# Patient Record
Sex: MALE | Race: WHITE | NOT HISPANIC OR LATINO | Employment: FULL TIME | URBAN - METROPOLITAN AREA
[De-identification: names, ages, dates, MRNs, and addresses within clinical notes are randomized per-mention and may not be internally consistent; named-entity substitution may affect disease eponyms.]

---

## 2017-08-24 DIAGNOSIS — Z00.00 ENCOUNTER FOR GENERAL ADULT MEDICAL EXAMINATION WITHOUT ABNORMAL FINDINGS: ICD-10-CM

## 2017-12-07 ENCOUNTER — ALLSCRIPTS OFFICE VISIT (OUTPATIENT)
Dept: OTHER | Facility: OTHER | Age: 47
End: 2017-12-07

## 2017-12-07 ENCOUNTER — LAB REQUISITION (OUTPATIENT)
Dept: LAB | Facility: HOSPITAL | Age: 47
End: 2017-12-07
Payer: COMMERCIAL

## 2017-12-07 DIAGNOSIS — D17.30 BENIGN LIPOMATOUS NEOPLASM OF SKIN AND SUBCUTANEOUS TISSUE: ICD-10-CM

## 2017-12-07 PROCEDURE — 88304 TISSUE EXAM BY PATHOLOGIST: CPT | Performed by: DERMATOLOGY

## 2017-12-08 NOTE — PROGRESS NOTES
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 9)   4  Nephrolithiasis (592 0) (N20 0)   5  Urinary frequency (788 41) (R35 0)   6  Urinary tract infection (599 0) (N39 0)    Past Medical History  Problems    1  History of Hodgkin's lymphoma (V10 72) (Z85 71)   2  History of Nephrolithiasis (V13 01)    The past medical history was reviewed  Surgical History  Problems    1  History of Bone Marrow Transplant    Surgical History reviewed      Family History  Mother    1  Family history of Hypertension (V17 49)  Father    2  Family history of Stroke Syndrome (V17 1)  Family History Reviewed- Derm:   Family History was reviewed      Social History  Problems    · Being A Social Drinker   · Current Smoker (305 1)   · Marital History - Currently    · Never A Smoker  The social history was reviewed      Current Meds   1  Crestor 20 MG Oral Tablet; TAKE 1 TABLET DAILY  Requested for: 12Jun2017; Last Rx:12Jun2017 Ordered   2  Sulfamethoxazole-TMP -160 MG TABS; TAKE 1 TABLET TWICE DAILY; Therapy: [id number] to (Evaluate:23Apr2014)  Requested for: 16Apr2014; Last Rx:16Apr2014 Ordered    Review of Systems   Constitutional: Denies constitutional symptoms  Eyes: Denies eye symptoms  ENT:  denies ear symptoms, nasal symptoms, mouth or throat symptoms  Cardiovascular: Denies cardiovascular symptoms  Respiratory: Denies respiratory symptoms    Gastrointestinal: Denies gastrointestinal symptoms  Musculoskeletal: Denies musculoskeletal symptoms  Integumentary: Denies symptoms other than stated above  Neurological: Denies neurologic symptoms  Psychiatric: Denies psychiatric symptoms  Endocrine: Denies endocrine symptoms  Hematologic/Lymphatic: Denies hematologic symptoms  Allergies  Medication    1  No Known Drug Allergies  Non-Medication    2  Pollen    Physical Exam   Constitutional  General appearance: Appears healthy and well developed  Lymphatic  No visible disturbance  Musculoskeletal  Digits and nails: No clubbing, cyanosis or edema  Cutaneous and nail exam normal    Skin  Scalp skin texture and hair distribution: Normal skin texture on scalp, normal hair distribution  Head: Normal turgor, no rashes, no lesions  Neck: Normal turgor, no rashes, no lesions  Chest: Normal turgor, no rashes, no lesions  Back: Normal turgor, no rashes, no lesions  Right upper extremity: Abnormal    Left upper extremity: Abnormal    Right lower extremity: Normal turgor, no rashes, no lesions  Left lower extremity: Normal turgor, no rashes, no lesions  Neuro/Psych  Alert and oriented x 3  Displays comfort and cooperation during encounterl  Affect is normal    Finding Large pedunculated 6 millimeter nodule noted on the right axilla fleshy in nature several other small skin fleshy pedunculated papules noted on both axilla x3 nothing else remarkable noted on complete exam       Procedure   Procedure: excision of lesion  Indications for the procedure include Probable fibro lipoma  Risks, benefits, alternatives, infection risk, bleeding risk, risk of allergic reaction and risk of scarring were discussed with the patient--   verbal consent was obtained prior to the procedure  Procedure Note: The lesion was located on the Right axilla  The patient was prepped and draped in sterile fashion using Betadine  Anesthesia: 1 ml of lidocaine 1% without epinephrine   shave excision  The hemostasis of the wound was achieved with Aluminum chloride  Dressing: The wound was cleaned and petroleum jelly was applied and a sterile compression dressing was placed  Specimen: the excised lesion was place in buffered formalin and sent for pathology  Post-Procedure:  Patient Status: the patient tolerated the procedure well  Complications: there were no complications  Procedure: skin tag removal   Risks, benefits, alternatives, infection risk, bleeding risk, risk of allergic reaction and the risk of scarring were discussed with the patient--   verbal consent was obtained prior to the procedure  Procedure Note:   Anesthesia: 1 ml of lidocaine 1% without epinephrine  The lesion was located on the Axilla bilaterally  The patient was prepped using alcohol  Removal Technique: removal with Iris scissors--   3 skin tags removed  The hemostasis of the wound was achieved with aluminum chloride  Post-Procedure:  Patient Status: the patient tolerated the procedure well  Complications: there were no complications  Plan  Fibrolipoma of skin    · Wound care as instructed ; Status:Complete;   Done: 73PCD2105    Discussion/Summary   Assessment #1: fibrolipoma  Care Plan:  Presumed diagnosis removed because of the patient concern and growth  Assessment #2: Skin tags  Care Plan:  No further treatment needed  Assessment #3: Screening for dermatologic disorders  Care Plan:  Nothing else of concern noted on complete exam follow-up as needed        Signatures   Electronically signed by : KIKE Snyder ; Dec  7 2017  9:18AM EST                       (Author)

## 2017-12-14 ENCOUNTER — GENERIC CONVERSION - ENCOUNTER (OUTPATIENT)
Dept: OTHER | Facility: OTHER | Age: 47
End: 2017-12-14

## 2018-01-23 NOTE — RESULT NOTES
Verified Results  (1) TISSUE EXAM [id number] 11:55AM Marilin Wei Order Number: [id number]     Test Name Result Flag Reference   LAB AP CASE REPORT (Report)     Surgical Pathology Report             Case: [id number]                   Authorizing Provider: Jorge Plummer MD     Collected:      12/07/2017 1155        Pathologist:      Cathi Hunter MD      Received:      12/11/2017 1435        Specimen:  Skin, Cyst/Tag/Debridement, Right axilla   LAB AP FINAL DIAGNOSIS      A  Skin, Cyst/Tag/Debridement, Right axilla, shave excision:  - Lipofibroma  Interpretation performed at [place] [address]  Electronically signed by Cathi Hunter MD on 12/13/2017 at 7:07 PM   LAB AP SURGICAL ADDITIONAL INFORMATION (Report)     All controls performed with the immunohistochemical stains reported above   reacted appropriately  These tests were developed and their performance   characteristics determined by [facility] or   [facility]  They may not be cleared or approved by the [place]  Food and Drug Administration  The FDA has determined that such clearance   or approval is not necessary  These tests are used for clinical purposes  They should not be regarded as investigational or for research  This   laboratory has been approved by [place] [address], designated as a high-complexity   laboratory and is qualified to perform these tests  LAB AP GROSS DESCRIPTION (Report)     A  The specimen is received in formalin, labeled with the patient's name   and hospital number, and is designated right axilla, is a portion of tan   and wrinkled skin measuring 1 3 x 1 0 x 0 9 cm  The resection margin is   inked green  The specimen is serially sectioned to reveal tan-white   fibrous cut surfaces  Entirely submitted  Two cassettes      Note: The estimated total formalin fixation time based upon information   provided by the submitting clinician and the standard processing schedule   is over 72 hours   Nasrin LOWE CLINICAL INFORMATION      TW Order Number: [id number]  Probable fibrolipoma

## 2018-03-05 ENCOUNTER — OFFICE VISIT (OUTPATIENT)
Dept: INTERNAL MEDICINE CLINIC | Facility: CLINIC | Age: 48
End: 2018-03-05
Payer: COMMERCIAL

## 2018-03-05 ENCOUNTER — TELEPHONE (OUTPATIENT)
Dept: INTERNAL MEDICINE CLINIC | Facility: CLINIC | Age: 48
End: 2018-03-05

## 2018-03-05 VITALS
OXYGEN SATURATION: 96 % | DIASTOLIC BLOOD PRESSURE: 72 MMHG | SYSTOLIC BLOOD PRESSURE: 110 MMHG | TEMPERATURE: 97.5 F | WEIGHT: 268 LBS | BODY MASS INDEX: 35.52 KG/M2 | RESPIRATION RATE: 16 BRPM | HEART RATE: 68 BPM | HEIGHT: 73 IN

## 2018-03-05 DIAGNOSIS — E78.5 HYPERLIPIDEMIA, UNSPECIFIED HYPERLIPIDEMIA TYPE: Primary | ICD-10-CM

## 2018-03-05 DIAGNOSIS — Z13.228 SCREENING FOR METABOLIC DISORDER: ICD-10-CM

## 2018-03-05 DIAGNOSIS — Z13.0 SCREENING FOR DEFICIENCY ANEMIA: ICD-10-CM

## 2018-03-05 DIAGNOSIS — Z13.29 SCREENING FOR THYROID DISORDER: ICD-10-CM

## 2018-03-05 PROCEDURE — 99214 OFFICE O/P EST MOD 30 MIN: CPT | Performed by: PHYSICIAN ASSISTANT

## 2018-03-05 RX ORDER — ROSUVASTATIN CALCIUM 20 MG/1
20 TABLET, FILM COATED ORAL DAILY
Qty: 30 TABLET | Refills: 0 | Status: SHIPPED | OUTPATIENT
Start: 2018-03-05 | End: 2018-04-06 | Stop reason: SDUPTHER

## 2018-03-05 RX ORDER — ROSUVASTATIN CALCIUM 20 MG/1
20 TABLET, COATED ORAL DAILY
Qty: 30 TABLET | Refills: 5 | Status: SHIPPED | OUTPATIENT
Start: 2018-03-05 | End: 2018-03-05 | Stop reason: SDUPTHER

## 2018-03-05 NOTE — PROGRESS NOTES
Assessment/Plan:      Hyperlipidemia- Crestor is renewed, patient has not been on it for about 6 weeks, he should get back on it, stay on it for 6-8 weeks and then recheck his lipid   Panel  He is given blood work orders for other screening tests as well  He refuses   A flu shot    No problem-specific Assessment & Plan notes found for this encounter  Diagnoses and all orders for this visit:    Hyperlipidemia, unspecified hyperlipidemia type  -     CK; Future  -     Lipid panel; Future  -     Discontinue: rosuvastatin (CRESTOR) 20 MG tablet; Take 1 tablet (20 mg total) by mouth daily  -     CRESTOR 20 MG tablet; Take 1 tablet (20 mg total) by mouth daily Brand necessary    Screening for deficiency anemia  -     CBC and differential; Future    Screening for metabolic disorder  -     Comprehensive metabolic panel; Future    Screening for thyroid disorder  -     TSH, 3rd generation with T4 reflex; Future          Subjective:      Patient ID: Belkys Chapin is a 56 y o  male  Patient comes in for medication refill  He has not been here in a little more than a year  He ran out of his Crestor about a month and a half ago  He is feeling well otherwise and has no complaints  Medication Refill   Pertinent negatives include no abdominal pain, arthralgias, chest pain, chills, coughing, fatigue, fever, myalgias, nausea or sore throat  The following portions of the patient's history were reviewed and updated as appropriate: allergies, current medications, past family history, past medical history, past social history, past surgical history and problem list     Review of Systems   Constitutional: Negative for chills, fatigue and fever  HENT: Negative for ear pain, postnasal drip, rhinorrhea and sore throat  Respiratory: Negative for cough, chest tightness and shortness of breath  Cardiovascular: Negative for chest pain and palpitations     Gastrointestinal: Negative for abdominal pain, diarrhea and nausea  Musculoskeletal: Negative for arthralgias and myalgias  Objective:      /72   Pulse 68   Temp 97 5 °F (36 4 °C)   Resp 16   Ht 6' 1" (1 854 m)   Wt 122 kg (268 lb)   SpO2 96%   BMI 35 36 kg/m²          Physical Exam   Constitutional: He is oriented to person, place, and time  He appears well-developed and well-nourished  HENT:   Head: Normocephalic and atraumatic  Right Ear: External ear normal    Left Ear: External ear normal    Mouth/Throat: Oropharynx is clear and moist  No oropharyngeal exudate  Eyes: Conjunctivae are normal  Pupils are equal, round, and reactive to light  Neck: Normal range of motion  Neck supple  Cardiovascular: Normal rate, regular rhythm, normal heart sounds and intact distal pulses  Pulmonary/Chest: Effort normal and breath sounds normal  No respiratory distress  Abdominal: Soft  Bowel sounds are normal  There is no tenderness  Lymphadenopathy:     He has no cervical adenopathy  Neurological: He is alert and oriented to person, place, and time  Skin: Skin is warm and dry  Psychiatric: He has a normal mood and affect   His behavior is normal  Judgment normal

## 2018-04-06 ENCOUNTER — TELEPHONE (OUTPATIENT)
Dept: INTERNAL MEDICINE CLINIC | Facility: CLINIC | Age: 48
End: 2018-04-06

## 2018-04-06 DIAGNOSIS — E78.5 HYPERLIPIDEMIA, UNSPECIFIED HYPERLIPIDEMIA TYPE: ICD-10-CM

## 2018-04-06 RX ORDER — ROSUVASTATIN CALCIUM 20 MG/1
20 TABLET, FILM COATED ORAL DAILY
Qty: 90 TABLET | Refills: 3 | Status: SHIPPED | OUTPATIENT
Start: 2018-04-06 | End: 2018-08-06 | Stop reason: SDUPTHER

## 2018-04-06 NOTE — TELEPHONE ENCOUNTER
PATIENT NEEDS A 3 MONTHS SUPPLY OF THE ROSUVASTATIN MEDICATION    ONLY ONE MONTH WAS CALLED IN  [address] [place] [address]

## 2018-07-26 ENCOUNTER — TELEPHONE (OUTPATIENT)
Dept: INTERNAL MEDICINE CLINIC | Facility: CLINIC | Age: 48
End: 2018-07-26

## 2018-07-26 NOTE — TELEPHONE ENCOUNTER
Pt is sched for physical appt on 08/10/18, would like labs ordered for his appt    Please advise, call back upon completion

## 2018-07-30 DIAGNOSIS — E78.00 HYPERCHOLESTEROLEMIA: ICD-10-CM

## 2018-07-30 DIAGNOSIS — R35.0 INCREASED FREQUENCY OF URINATION: ICD-10-CM

## 2018-07-30 DIAGNOSIS — G58.9 MONONEUROPATHY: Primary | ICD-10-CM

## 2018-07-31 ENCOUNTER — APPOINTMENT (OUTPATIENT)
Dept: LAB | Facility: CLINIC | Age: 48
End: 2018-07-31
Payer: COMMERCIAL

## 2018-07-31 DIAGNOSIS — Z13.29 SCREENING FOR THYROID DISORDER: ICD-10-CM

## 2018-07-31 DIAGNOSIS — Z00.00 ENCOUNTER FOR GENERAL ADULT MEDICAL EXAMINATION WITHOUT ABNORMAL FINDINGS: ICD-10-CM

## 2018-07-31 DIAGNOSIS — G58.9 MONONEUROPATHY: ICD-10-CM

## 2018-07-31 DIAGNOSIS — E78.00 HYPERCHOLESTEROLEMIA: ICD-10-CM

## 2018-07-31 DIAGNOSIS — Z13.228 SCREENING FOR METABOLIC DISORDER: ICD-10-CM

## 2018-07-31 DIAGNOSIS — Z13.0 SCREENING FOR DEFICIENCY ANEMIA: ICD-10-CM

## 2018-07-31 DIAGNOSIS — E78.5 HYPERLIPIDEMIA, UNSPECIFIED HYPERLIPIDEMIA TYPE: ICD-10-CM

## 2018-07-31 DIAGNOSIS — R35.0 INCREASED FREQUENCY OF URINATION: ICD-10-CM

## 2018-07-31 LAB
ABO GROUP BLD: NORMAL
ALBUMIN SERPL BCP-MCNC: 4.2 G/DL (ref 3.5–5)
ALP SERPL-CCNC: 71 U/L (ref 46–116)
ALT SERPL W P-5'-P-CCNC: 46 U/L (ref 12–78)
ANION GAP SERPL CALCULATED.3IONS-SCNC: 6 MMOL/L (ref 4–13)
AST SERPL W P-5'-P-CCNC: 26 U/L (ref 5–45)
BASOPHILS # BLD AUTO: 0.02 THOUSANDS/ΜL (ref 0–0.1)
BASOPHILS NFR BLD AUTO: 0 % (ref 0–1)
BILIRUB SERPL-MCNC: 0.57 MG/DL (ref 0.2–1)
BILIRUB UR QL STRIP: NEGATIVE
BUN SERPL-MCNC: 19 MG/DL (ref 5–25)
CALCIUM SERPL-MCNC: 9.3 MG/DL (ref 8.3–10.1)
CHLORIDE SERPL-SCNC: 104 MMOL/L (ref 100–108)
CHOLEST SERPL-MCNC: 181 MG/DL (ref 50–200)
CK MB SERPL-MCNC: 1.9 NG/ML (ref 0–5)
CK MB SERPL-MCNC: <1 % (ref 0–2.5)
CK SERPL-CCNC: 233 U/L (ref 39–308)
CLARITY UR: CLEAR
CO2 SERPL-SCNC: 29 MMOL/L (ref 21–32)
COLOR UR: YELLOW
CREAT SERPL-MCNC: 1 MG/DL (ref 0.6–1.3)
EOSINOPHIL # BLD AUTO: 0.07 THOUSAND/ΜL (ref 0–0.61)
EOSINOPHIL NFR BLD AUTO: 2 % (ref 0–6)
ERYTHROCYTE [DISTWIDTH] IN BLOOD BY AUTOMATED COUNT: 12.4 % (ref 11.6–15.1)
GFR SERPL CREATININE-BSD FRML MDRD: 89 ML/MIN/1.73SQ M
GLUCOSE P FAST SERPL-MCNC: 92 MG/DL (ref 65–99)
GLUCOSE UR STRIP-MCNC: NEGATIVE MG/DL
HCT VFR BLD AUTO: 43.4 % (ref 36.5–49.3)
HDLC SERPL-MCNC: 53 MG/DL (ref 40–60)
HGB BLD-MCNC: 13.7 G/DL (ref 12–17)
HGB UR QL STRIP.AUTO: NEGATIVE
IMM GRANULOCYTES # BLD AUTO: 0.01 THOUSAND/UL (ref 0–0.2)
IMM GRANULOCYTES NFR BLD AUTO: 0 % (ref 0–2)
KETONES UR STRIP-MCNC: NEGATIVE MG/DL
LDLC SERPL CALC-MCNC: 95 MG/DL (ref 0–100)
LEUKOCYTE ESTERASE UR QL STRIP: NEGATIVE
LYMPHOCYTES # BLD AUTO: 1.3 THOUSANDS/ΜL (ref 0.6–4.47)
LYMPHOCYTES NFR BLD AUTO: 28 % (ref 14–44)
MCH RBC QN AUTO: 29.7 PG (ref 26.8–34.3)
MCHC RBC AUTO-ENTMCNC: 31.6 G/DL (ref 31.4–37.4)
MCV RBC AUTO: 94 FL (ref 82–98)
MONOCYTES # BLD AUTO: 0.39 THOUSAND/ΜL (ref 0.17–1.22)
MONOCYTES NFR BLD AUTO: 8 % (ref 4–12)
NEUTROPHILS # BLD AUTO: 2.93 THOUSANDS/ΜL (ref 1.85–7.62)
NEUTS SEG NFR BLD AUTO: 62 % (ref 43–75)
NITRITE UR QL STRIP: NEGATIVE
NRBC BLD AUTO-RTO: 0 /100 WBCS
PH UR STRIP.AUTO: 6.5 [PH] (ref 4.5–8)
PLATELET # BLD AUTO: 280 THOUSANDS/UL (ref 149–390)
PMV BLD AUTO: 10.5 FL (ref 8.9–12.7)
POTASSIUM SERPL-SCNC: 4.4 MMOL/L (ref 3.5–5.3)
PROT SERPL-MCNC: 7.9 G/DL (ref 6.4–8.2)
PROT UR STRIP-MCNC: NEGATIVE MG/DL
PSA SERPL-MCNC: 0.5 NG/ML (ref 0–4)
RBC # BLD AUTO: 4.61 MILLION/UL (ref 3.88–5.62)
RH BLD: POSITIVE
SODIUM SERPL-SCNC: 139 MMOL/L (ref 136–145)
SP GR UR STRIP.AUTO: 1.02 (ref 1–1.03)
TRIGL SERPL-MCNC: 164 MG/DL
TSH SERPL DL<=0.05 MIU/L-ACNC: 2.31 UIU/ML (ref 0.36–3.74)
UROBILINOGEN UR QL STRIP.AUTO: 0.2 E.U./DL
VIT B12 SERPL-MCNC: 484 PG/ML (ref 100–900)
WBC # BLD AUTO: 4.72 THOUSAND/UL (ref 4.31–10.16)

## 2018-07-31 PROCEDURE — 80053 COMPREHEN METABOLIC PANEL: CPT

## 2018-07-31 PROCEDURE — 84443 ASSAY THYROID STIM HORMONE: CPT

## 2018-07-31 PROCEDURE — 82607 VITAMIN B-12: CPT

## 2018-07-31 PROCEDURE — 82553 CREATINE MB FRACTION: CPT

## 2018-07-31 PROCEDURE — 81003 URINALYSIS AUTO W/O SCOPE: CPT | Performed by: INTERNAL MEDICINE

## 2018-07-31 PROCEDURE — 85025 COMPLETE CBC W/AUTO DIFF WBC: CPT

## 2018-07-31 PROCEDURE — G0103 PSA SCREENING: HCPCS

## 2018-07-31 PROCEDURE — 86900 BLOOD TYPING SEROLOGIC ABO: CPT

## 2018-07-31 PROCEDURE — 82550 ASSAY OF CK (CPK): CPT

## 2018-07-31 PROCEDURE — 36415 COLL VENOUS BLD VENIPUNCTURE: CPT

## 2018-07-31 PROCEDURE — 86901 BLOOD TYPING SEROLOGIC RH(D): CPT

## 2018-07-31 PROCEDURE — 80061 LIPID PANEL: CPT

## 2018-08-03 ENCOUNTER — TELEPHONE (OUTPATIENT)
Dept: INTERNAL MEDICINE CLINIC | Facility: CLINIC | Age: 48
End: 2018-08-03

## 2018-08-03 NOTE — TELEPHONE ENCOUNTER
PT NEEDS A PRIOR AUTH FOR CRESTOR 20MG HE WENT TO PICK IT UP AT [facility] NEED THE AUTH BEFORE HE LEFT THIS NUMBER THEY Mauricio 83 HIM [phone]   PLEASE ADVISE PT WHEN ITS DONE

## 2018-08-03 NOTE — TELEPHONE ENCOUNTER
I called both Pharmacy and pt, reason for pre Evia Lites is that Crestor was mistakenly Rx'd "brand necessary"  Per Dr Pablo Johnson, med may be substituted with generic  [facility] instructed to change to generic; pt was notified

## 2018-08-06 DIAGNOSIS — E78.5 HYPERLIPIDEMIA, UNSPECIFIED HYPERLIPIDEMIA TYPE: ICD-10-CM

## 2018-08-06 RX ORDER — ROSUVASTATIN CALCIUM 20 MG/1
20 TABLET, COATED ORAL DAILY
Qty: 90 TABLET | Refills: 3 | Status: SHIPPED | OUTPATIENT
Start: 2018-08-06 | End: 2019-01-09 | Stop reason: SDUPTHER

## 2018-08-10 ENCOUNTER — TELEPHONE (OUTPATIENT)
Dept: INTERNAL MEDICINE CLINIC | Facility: CLINIC | Age: 48
End: 2018-08-10

## 2018-08-10 NOTE — TELEPHONE ENCOUNTER
Went to Sunday to get his Crestor and rx was not there  He needs the Crestor he can not do the generic brand  Looks like it was sent   Please advise patient

## 2018-08-10 NOTE — TELEPHONE ENCOUNTER
Authorization obtained and notified Norberto/Pharmacist   # [id number]  Tevin Matos Unable to lvm for pt as his vm was full - pharmacy will contact pt

## 2018-08-28 ENCOUNTER — OFFICE VISIT (OUTPATIENT)
Dept: INTERNAL MEDICINE CLINIC | Facility: CLINIC | Age: 48
End: 2018-08-28
Payer: COMMERCIAL

## 2018-08-28 VITALS
HEART RATE: 100 BPM | OXYGEN SATURATION: 97 % | BODY MASS INDEX: 35.33 KG/M2 | HEIGHT: 73 IN | WEIGHT: 266.6 LBS | SYSTOLIC BLOOD PRESSURE: 124 MMHG | DIASTOLIC BLOOD PRESSURE: 82 MMHG

## 2018-08-28 DIAGNOSIS — Z12.11 COLON CANCER SCREENING: ICD-10-CM

## 2018-08-28 DIAGNOSIS — E78.00 HYPERCHOLESTEROLEMIA: Primary | ICD-10-CM

## 2018-08-28 PROBLEM — C81.90 HODGKIN'S LYMPHOMA (HCC): Status: RESOLVED | Noted: 2018-08-28 | Resolved: 2018-08-28

## 2018-08-28 PROBLEM — M72.2 PLANTAR FASCIITIS: Status: ACTIVE | Noted: 2018-08-28

## 2018-08-28 PROCEDURE — 3008F BODY MASS INDEX DOCD: CPT | Performed by: INTERNAL MEDICINE

## 2018-08-28 PROCEDURE — 99214 OFFICE O/P EST MOD 30 MIN: CPT | Performed by: INTERNAL MEDICINE

## 2018-08-28 NOTE — PROGRESS NOTES
Assessment/Plan:       There are no diagnoses linked to this encounter  There are no Patient Instructions on file for this visit  Subjective:      Patient ID: Andrew Yeboah is a 53 y o  male  Patient here for a routine quite examination  He has a chief complaint of feeling pain in the right heel, at the most proximal part of the calcaneus with the Achilles tendon inserts  This been going on about 5 days  It has been getting better to the point where 5 days ago was 10 of 10 and now it is 1 of 10  He has done nothing in particular but has taken some Aleve  He had this several months ago and it was less severe and lasted only a couple of days    Has a job worries on his feet all day  Hyperlipidemic and treated  Otherwise stable  The following portions of the patient's history were reviewed and updated as appropriate:   He has a past medical history of Hodgkin's lymphoma ([facility] [address] ) and Nephrolithiasis  ,   does not have any pertinent problems on file  ,   has a past surgical history that includes Bone marrow transplant (01/01/1991)  ,  family history includes Hypertension in his mother; Stroke in his father  ,   reports that he has been smoking Cigarettes  He has never used smokeless tobacco  He reports that he drinks alcohol  He reports that he does not use drugs  ,  is allergic to pollen extract     Current Outpatient Prescriptions   Medication Sig Dispense Refill    rosuvastatin (CRESTOR) 20 MG tablet Take 1 tablet (20 mg total) by mouth daily Brand necessary 90 tablet 3     No current facility-administered medications for this visit  Review of Systems   Constitutional: Negative for chills and fever  HENT: Negative for sore throat and trouble swallowing  Eyes: Negative for pain  Respiratory: Negative for cough and shortness of breath  Cardiovascular: Negative for chest pain and palpitations     Gastrointestinal: Negative for abdominal pain, blood in stool, diarrhea, nausea and vomiting  Endocrine: Negative for cold intolerance and heat intolerance  Genitourinary: Negative for dysuria, frequency and testicular pain  Musculoskeletal: Positive for arthralgias  Negative for joint swelling  Allergic/Immunologic: Negative for immunocompromised state  Neurological: Negative for dizziness, syncope and headaches  Hematological: Negative for adenopathy  Does not bruise/bleed easily  Psychiatric/Behavioral: Negative for dysphoric mood  The patient is not nervous/anxious  Objective:  Vitals:    08/28/18 0845   BP: 124/82   Pulse: 100   SpO2: 97%      Physical Exam   Constitutional: He is oriented to person, place, and time  He appears well-developed and well-nourished  Pleasant overweight male who appears to be stated age not in distress  Seated quietly verbalizing complaint of heel pain  HENT:   Head: Normocephalic and atraumatic  Eyes: Pupils are equal, round, and reactive to light  Neck: Normal range of motion  Neck supple  No tracheal deviation present  No thyromegaly present  Cardiovascular: Normal rate, regular rhythm and normal heart sounds  Exam reveals no gallop  No murmur heard  Pulmonary/Chest: Effort normal  No respiratory distress  He has no wheezes  He has no rales  Abdominal: Soft  Bowel sounds are normal  There is no tenderness  Musculoskeletal: Normal range of motion  He exhibits no tenderness or deformity  Neurological: He is alert and oriented to person, place, and time  He has normal reflexes  Coordination normal    Skin: Skin is warm and dry  Psychiatric: He has a normal mood and affect

## 2018-08-28 NOTE — PATIENT INSTRUCTIONS
A patient with the above complaint to is otherwise stable although lipids are treated and he could lose some weight  Colonoscopy recommended  Follow-up yearly or as needed  Use an arch support in the work boots  Call if the heel pain recurs

## 2019-01-09 DIAGNOSIS — E78.5 HYPERLIPIDEMIA, UNSPECIFIED HYPERLIPIDEMIA TYPE: ICD-10-CM

## 2019-01-09 RX ORDER — ROSUVASTATIN CALCIUM 20 MG/1
20 TABLET, COATED ORAL DAILY
Qty: 90 TABLET | Refills: 0 | Status: SHIPPED | OUTPATIENT
Start: 2019-01-09 | End: 2019-06-22 | Stop reason: SDUPTHER

## 2019-06-22 ENCOUNTER — OFFICE VISIT (OUTPATIENT)
Dept: INTERNAL MEDICINE CLINIC | Facility: CLINIC | Age: 49
End: 2019-06-22
Payer: COMMERCIAL

## 2019-06-22 ENCOUNTER — APPOINTMENT (OUTPATIENT)
Dept: LAB | Facility: CLINIC | Age: 49
End: 2019-06-22
Payer: COMMERCIAL

## 2019-06-22 VITALS
SYSTOLIC BLOOD PRESSURE: 120 MMHG | HEART RATE: 83 BPM | WEIGHT: 241.8 LBS | HEIGHT: 73 IN | DIASTOLIC BLOOD PRESSURE: 68 MMHG | OXYGEN SATURATION: 96 % | BODY MASS INDEX: 32.05 KG/M2 | TEMPERATURE: 97.8 F

## 2019-06-22 DIAGNOSIS — K12.1 STOMATITIS: ICD-10-CM

## 2019-06-22 DIAGNOSIS — E78.5 HYPERLIPIDEMIA, UNSPECIFIED HYPERLIPIDEMIA TYPE: ICD-10-CM

## 2019-06-22 DIAGNOSIS — E78.00 HYPERCHOLESTEROLEMIA: ICD-10-CM

## 2019-06-22 DIAGNOSIS — E78.00 HYPERCHOLESTEROLEMIA: Primary | ICD-10-CM

## 2019-06-22 LAB
25(OH)D3 SERPL-MCNC: 14.1 NG/ML (ref 30–100)
ALBUMIN SERPL BCP-MCNC: 4.2 G/DL (ref 3.5–5)
ALP SERPL-CCNC: 68 U/L (ref 46–116)
ALT SERPL W P-5'-P-CCNC: 27 U/L (ref 12–78)
ANION GAP SERPL CALCULATED.3IONS-SCNC: 7 MMOL/L (ref 4–13)
AST SERPL W P-5'-P-CCNC: 19 U/L (ref 5–45)
BASOPHILS # BLD AUTO: 0.03 THOUSANDS/ΜL (ref 0–0.1)
BASOPHILS NFR BLD AUTO: 1 % (ref 0–1)
BILIRUB SERPL-MCNC: 0.52 MG/DL (ref 0.2–1)
BILIRUB UR QL STRIP: NEGATIVE
BUN SERPL-MCNC: 20 MG/DL (ref 5–25)
CALCIUM SERPL-MCNC: 6.1 MG/DL (ref 8.3–10.1)
CHLORIDE SERPL-SCNC: 109 MMOL/L (ref 100–108)
CHOLEST SERPL-MCNC: 225 MG/DL (ref 50–200)
CLARITY UR: CLEAR
CO2 SERPL-SCNC: 27 MMOL/L (ref 21–32)
COLOR UR: YELLOW
CREAT SERPL-MCNC: 0.93 MG/DL (ref 0.6–1.3)
EOSINOPHIL # BLD AUTO: 0.06 THOUSAND/ΜL (ref 0–0.61)
EOSINOPHIL NFR BLD AUTO: 1 % (ref 0–6)
ERYTHROCYTE [DISTWIDTH] IN BLOOD BY AUTOMATED COUNT: 12.6 % (ref 11.6–15.1)
EST. AVERAGE GLUCOSE BLD GHB EST-MCNC: 128 MG/DL
GFR SERPL CREATININE-BSD FRML MDRD: 97 ML/MIN/1.73SQ M
GLUCOSE P FAST SERPL-MCNC: 95 MG/DL (ref 65–99)
GLUCOSE UR STRIP-MCNC: NEGATIVE MG/DL
HBA1C MFR BLD: 6.1 % (ref 4.2–6.3)
HCT VFR BLD AUTO: 41.3 % (ref 36.5–49.3)
HDLC SERPL-MCNC: 49 MG/DL (ref 40–60)
HGB BLD-MCNC: 13 G/DL (ref 12–17)
HGB UR QL STRIP.AUTO: NEGATIVE
IMM GRANULOCYTES # BLD AUTO: 0.01 THOUSAND/UL (ref 0–0.2)
IMM GRANULOCYTES NFR BLD AUTO: 0 % (ref 0–2)
KETONES UR STRIP-MCNC: NEGATIVE MG/DL
LDLC SERPL CALC-MCNC: 147 MG/DL (ref 0–100)
LEUKOCYTE ESTERASE UR QL STRIP: NEGATIVE
LYMPHOCYTES # BLD AUTO: 1.17 THOUSANDS/ΜL (ref 0.6–4.47)
LYMPHOCYTES NFR BLD AUTO: 26 % (ref 14–44)
MCH RBC QN AUTO: 29.8 PG (ref 26.8–34.3)
MCHC RBC AUTO-ENTMCNC: 31.5 G/DL (ref 31.4–37.4)
MCV RBC AUTO: 95 FL (ref 82–98)
MONOCYTES # BLD AUTO: 0.37 THOUSAND/ΜL (ref 0.17–1.22)
MONOCYTES NFR BLD AUTO: 8 % (ref 4–12)
NEUTROPHILS # BLD AUTO: 2.84 THOUSANDS/ΜL (ref 1.85–7.62)
NEUTS SEG NFR BLD AUTO: 64 % (ref 43–75)
NITRITE UR QL STRIP: NEGATIVE
NONHDLC SERPL-MCNC: 176 MG/DL
NRBC BLD AUTO-RTO: 0 /100 WBCS
PH UR STRIP.AUTO: 6 [PH]
PLATELET # BLD AUTO: 269 THOUSANDS/UL (ref 149–390)
PMV BLD AUTO: 11 FL (ref 8.9–12.7)
POTASSIUM SERPL-SCNC: 4.1 MMOL/L (ref 3.5–5.3)
PROT SERPL-MCNC: 7.4 G/DL (ref 6.4–8.2)
PROT UR STRIP-MCNC: NEGATIVE MG/DL
RBC # BLD AUTO: 4.36 MILLION/UL (ref 3.88–5.62)
SODIUM SERPL-SCNC: 143 MMOL/L (ref 136–145)
SP GR UR STRIP.AUTO: 1.02 (ref 1–1.03)
TRIGL SERPL-MCNC: 145 MG/DL
TSH SERPL DL<=0.05 MIU/L-ACNC: 3.73 UIU/ML (ref 0.36–3.74)
UROBILINOGEN UR QL STRIP.AUTO: 0.2 E.U./DL
VIT B12 SERPL-MCNC: 395 PG/ML (ref 100–900)
WBC # BLD AUTO: 4.48 THOUSAND/UL (ref 4.31–10.16)

## 2019-06-22 PROCEDURE — 83036 HEMOGLOBIN GLYCOSYLATED A1C: CPT

## 2019-06-22 PROCEDURE — 82542 COL CHROMOTOGRAPHY QUAL/QUAN: CPT

## 2019-06-22 PROCEDURE — 85025 COMPLETE CBC W/AUTO DIFF WBC: CPT

## 2019-06-22 PROCEDURE — 80053 COMPREHEN METABOLIC PANEL: CPT

## 2019-06-22 PROCEDURE — 84590 ASSAY OF VITAMIN A: CPT

## 2019-06-22 PROCEDURE — 81003 URINALYSIS AUTO W/O SCOPE: CPT | Performed by: PHYSICIAN ASSISTANT

## 2019-06-22 PROCEDURE — 82607 VITAMIN B-12: CPT

## 2019-06-22 PROCEDURE — 82306 VITAMIN D 25 HYDROXY: CPT

## 2019-06-22 PROCEDURE — 84443 ASSAY THYROID STIM HORMONE: CPT

## 2019-06-22 PROCEDURE — 82180 ASSAY OF ASCORBIC ACID: CPT

## 2019-06-22 PROCEDURE — 36415 COLL VENOUS BLD VENIPUNCTURE: CPT

## 2019-06-22 PROCEDURE — 80061 LIPID PANEL: CPT

## 2019-06-22 PROCEDURE — 99214 OFFICE O/P EST MOD 30 MIN: CPT | Performed by: PHYSICIAN ASSISTANT

## 2019-06-22 RX ORDER — ROSUVASTATIN CALCIUM 20 MG/1
20 TABLET, COATED ORAL DAILY
Qty: 90 TABLET | Refills: 0 | Status: SHIPPED | OUTPATIENT
Start: 2019-06-22 | End: 2020-04-23 | Stop reason: SDUPTHER

## 2019-06-26 LAB
VIT A SERPL-MCNC: 59.9 UG/DL (ref 20.1–62)
VIT C SERPL-MCNC: 0.8 MG/DL (ref 0.2–2)

## 2019-06-28 LAB
NIACIN SERPL-MCNC: <5 NG/ML (ref 0–5)
NICOTINAMIDE SERPL-MCNC: 11.2 NG/ML (ref 5.2–72.1)

## 2019-08-08 ENCOUNTER — OFFICE VISIT (OUTPATIENT)
Dept: DERMATOLOGY | Facility: CLINIC | Age: 49
End: 2019-08-08
Payer: COMMERCIAL

## 2019-08-08 DIAGNOSIS — D22.9 NEVUS: ICD-10-CM

## 2019-08-08 DIAGNOSIS — Z13.89 SCREENING FOR SKIN CONDITION: ICD-10-CM

## 2019-08-08 DIAGNOSIS — K13.0 CHEILITIS: Primary | ICD-10-CM

## 2019-08-08 PROCEDURE — 99214 OFFICE O/P EST MOD 30 MIN: CPT | Performed by: DERMATOLOGY

## 2019-08-08 RX ORDER — DESONIDE 0.5 MG/G
OINTMENT TOPICAL 2 TIMES DAILY
Qty: 15 G | Refills: 2 | Status: SHIPPED | OUTPATIENT
Start: 2019-08-08 | End: 2021-04-22

## 2019-08-08 NOTE — PATIENT INSTRUCTIONS
Cheilitis we discussed that this is probably irritant phenomenon question of a contact related process was also considered at present suggested use of [employer] [place] toothpaste Vaseline instead of using chapstick and the humidifier in the bedroom especially in the winter  Would also said she just use of Dove for sensitive skin as well    Nevi reviewed the concept of ABCDE and ugly duckling nothing markedly atypical patient reassured  Screening for Dermatologic Disorders: Nothing else of concern noted on complete exam follow up prn

## 2019-08-08 NOTE — PROGRESS NOTES
[facility] DERMATOLOGY  [address] [place] [id number]  [phone]  [phone]     MRN: [id number] : 1970  Encounter: [id number]  Patient Information: Devante Cloud  Chief complaint:  Irritation of the lips    History of present illness:  43-year-old male who had not seen for several years with previous history of Hodgkin's lymphoma and a bone marrow transplant presents for overall concerns regarding lips irritation that has been going on for  about a year  Patient denies any new contactants any changes regarding dental issues  Using chapstick  Patient does snore  Past Medical History:   Diagnosis Date    Hodgkin's lymphoma ([facility] [address] )     Nephrolithiasis      Past Surgical History:   Procedure Laterality Date    BONE MARROW TRANSPLANT  1991     Social History   Social History     Substance and Sexual Activity   Alcohol Use Yes    Frequency: Monthly or less    Drinks per session: 1 or 2    Binge frequency: Never    Comment: social     Social History     Substance and Sexual Activity   Drug Use No     Social History     Tobacco Use   Smoking Status Light Tobacco Smoker    Types: Cigarettes   Smokeless Tobacco Never Used   Tobacco Comment    also states NEVER a smoker, per [facility]     Family History   Problem Relation Age of Onset    Hypertension Mother     Stroke Father      Meds/Allergies   Allergies   Allergen Reactions    Pollen Extract Eye Swelling       Meds:  Prior to Admission medications    Medication Sig Start Date End Date Taking?  Authorizing Provider   rosuvastatin (CRESTOR) 20 MG tablet Take 1 tablet (20 mg total) by mouth daily Brand necessary 19  Yes Jostin Chacon PA-C       Subjective:     Review of Systems:    General: negative for - chills, fatigue, fever,  weight gain or weight loss  Psychological: negative for - anxiety, behavioral disorder, concentration difficulties, decreased libido, depression, irritability, memory difficulties, mood swings, sleep disturbances or suicidal ideation  ENT: negative for - hearing difficulties , nasal congestion, nasal discharge, oral lesions, sinus pain, sneezing, sore throat  Allergy and Immunology: negative for - hives, insect bite sensitivity,  Hematological and Lymphatic: negative for - bleeding problems, blood clots,bruising, swollen lymph nodes  Endocrine: negative for - hair pattern changes, hot flashes, malaise/lethargy, mood swings, palpitations, polydipsia/polyuria, skin changes, temperature intolerance or unexpected weight change  Respiratory: negative for - cough, hemoptysis, orthopnea, shortness of breath, or wheezing  Cardiovascular: negative for - chest pain, dyspnea on exertion, edema,  Gastrointestinal: negative for - abdominal pain, nausea/vomiting  Genito-Urinary: negative for - dysuria, incontinence, irregular/heavy menses or urinary frequency/urgency  Musculoskeletal: negative for - gait disturbance, joint pain, joint stiffness, joint swelling, muscle pain, muscular weakness  Dermatological:  As in HPI  Neurological: negative for confusion, dizziness, headaches, impaired coordination/balance, memory loss, numbness/tingling, seizures, speech problems, tremors or weakness       Objective: There were no vitals taken for this visit  Physical Exam:    General Appearance:    Alert, cooperative, no distress   Head:    Normocephalic, without obvious abnormality, atraumatic   Lymphatics:    No lymphadenopathy noted      Abdomen:   No hepatosplenomegaly   Skin:   A full skin exam was performed including scalp, head scalp, eyes, ears, nose, lips, neck, chest, axilla, abdomen, back, buttocks, bilateral upper extremities, bilateral lower extremities, hands, feet, fingers, toes, fingernails, and toenails minimal erythema scaling noted around the lips lips may be slightly swollen not markedly so and no sign of any inflammation in the mucosa    normal keratotic papules normal pigmented lesions regular shape and color     Assessment:     1  Cheilitis     2  Nevus     3  Screening for skin condition           Plan:   Cheilitis we discussed that this is probably irritant phenomenon question of a contact related process was also considered at present suggested use of [employer] toothpaste Vaseline instead of using chapstick and the humidifier in the bedroom especially in the winter  Would also said she just use of Dove for sensitive skin as well  Nevi reviewed the concept of ABCDE and ugly duckling nothing markedly atypical patient reassured  Screening for Dermatologic Disorders: Nothing else of concern noted on complete exam follow up in 1 year       Albino Stock MD  8/8/2019,8:57 AM    Portions of the record may have been created with voice recognition software   Occasional wrong word or "sound a like" substitutions may have occurred due to the inherent limitations of voice recognition software   Read the chart carefully and recognize, using context, where substitutions have occurred

## 2020-04-23 DIAGNOSIS — E78.5 HYPERLIPIDEMIA, UNSPECIFIED HYPERLIPIDEMIA TYPE: ICD-10-CM

## 2020-04-23 RX ORDER — ROSUVASTATIN CALCIUM 20 MG/1
20 TABLET, COATED ORAL DAILY
Qty: 90 TABLET | Refills: 1 | Status: SHIPPED | OUTPATIENT
Start: 2020-04-23 | End: 2020-09-21

## 2020-09-10 ENCOUNTER — TELEPHONE (OUTPATIENT)
Dept: INTERNAL MEDICINE CLINIC | Facility: CLINIC | Age: 50
End: 2020-09-10

## 2020-09-16 ENCOUNTER — TELEPHONE (OUTPATIENT)
Dept: INTERNAL MEDICINE CLINIC | Facility: CLINIC | Age: 50
End: 2020-09-16

## 2020-09-18 NOTE — TELEPHONE ENCOUNTER
Patient needs a replacement med for Crestor RX'ed ASAP  Patrick Po he can't take generic Crestor, he tried it and he got aches from it    so needs another medication prescribed     he's been without the med for about a week and would like to get something this weekend

## 2020-09-21 DIAGNOSIS — E78.5 HYPERLIPIDEMIA, UNSPECIFIED HYPERLIPIDEMIA TYPE: Primary | ICD-10-CM

## 2020-09-21 RX ORDER — ATORVASTATIN CALCIUM 20 MG/1
20 TABLET, FILM COATED ORAL DAILY
Qty: 90 TABLET | Refills: 3 | Status: SHIPPED | OUTPATIENT
Start: 2020-09-21 | End: 2021-04-22

## 2020-10-06 DIAGNOSIS — Z20.828 EXPOSURE TO SARS-ASSOCIATED CORONAVIRUS: Primary | ICD-10-CM

## 2020-10-06 DIAGNOSIS — Z20.828 EXPOSURE TO SARS-ASSOCIATED CORONAVIRUS: ICD-10-CM

## 2020-10-06 PROCEDURE — U0003 INFECTIOUS AGENT DETECTION BY NUCLEIC ACID (DNA OR RNA); SEVERE ACUTE RESPIRATORY SYNDROME CORONAVIRUS 2 (SARS-COV-2) (CORONAVIRUS DISEASE [COVID-19]), AMPLIFIED PROBE TECHNIQUE, MAKING USE OF HIGH THROUGHPUT TECHNOLOGIES AS DESCRIBED BY CMS-2020-01-R: HCPCS | Performed by: NURSE PRACTITIONER

## 2020-10-07 LAB — SARS-COV-2 RNA SPEC QL NAA+PROBE: NOT DETECTED

## 2020-10-08 ENCOUNTER — TELEPHONE (OUTPATIENT)
Dept: INTERNAL MEDICINE CLINIC | Facility: CLINIC | Age: 50
End: 2020-10-08

## 2021-04-22 ENCOUNTER — OFFICE VISIT (OUTPATIENT)
Dept: INTERNAL MEDICINE CLINIC | Facility: CLINIC | Age: 51
End: 2021-04-22
Payer: COMMERCIAL

## 2021-04-22 ENCOUNTER — APPOINTMENT (OUTPATIENT)
Dept: LAB | Facility: CLINIC | Age: 51
End: 2021-04-22
Payer: COMMERCIAL

## 2021-04-22 VITALS
OXYGEN SATURATION: 95 % | TEMPERATURE: 96.4 F | DIASTOLIC BLOOD PRESSURE: 88 MMHG | WEIGHT: 285.2 LBS | BODY MASS INDEX: 37.63 KG/M2 | SYSTOLIC BLOOD PRESSURE: 132 MMHG | HEART RATE: 89 BPM

## 2021-04-22 DIAGNOSIS — E78.00 HYPERCHOLESTEROLEMIA: ICD-10-CM

## 2021-04-22 DIAGNOSIS — Z23 ENCOUNTER FOR IMMUNIZATION: ICD-10-CM

## 2021-04-22 DIAGNOSIS — Z12.11 SCREENING FOR COLORECTAL CANCER: ICD-10-CM

## 2021-04-22 DIAGNOSIS — E78.5 HYPERLIPIDEMIA, UNSPECIFIED HYPERLIPIDEMIA TYPE: Primary | ICD-10-CM

## 2021-04-22 DIAGNOSIS — E78.5 HYPERLIPIDEMIA, UNSPECIFIED HYPERLIPIDEMIA TYPE: ICD-10-CM

## 2021-04-22 DIAGNOSIS — M77.9 TENDONITIS: ICD-10-CM

## 2021-04-22 DIAGNOSIS — Z12.12 SCREENING FOR COLORECTAL CANCER: ICD-10-CM

## 2021-04-22 DIAGNOSIS — E66.09 OBESITY DUE TO EXCESS CALORIES WITHOUT SERIOUS COMORBIDITY, UNSPECIFIED CLASSIFICATION: ICD-10-CM

## 2021-04-22 DIAGNOSIS — Z11.4 SCREENING FOR HIV (HUMAN IMMUNODEFICIENCY VIRUS): ICD-10-CM

## 2021-04-22 LAB
ALBUMIN SERPL BCP-MCNC: 4.4 G/DL (ref 3.5–5)
ALP SERPL-CCNC: 70 U/L (ref 46–116)
ALT SERPL W P-5'-P-CCNC: 68 U/L (ref 12–78)
ANION GAP SERPL CALCULATED.3IONS-SCNC: 7 MMOL/L (ref 4–13)
AST SERPL W P-5'-P-CCNC: 31 U/L (ref 5–45)
BASOPHILS # BLD AUTO: 0.04 THOUSANDS/ΜL (ref 0–0.1)
BASOPHILS NFR BLD AUTO: 1 % (ref 0–1)
BILIRUB SERPL-MCNC: 0.53 MG/DL (ref 0.2–1)
BILIRUB UR QL STRIP: NEGATIVE
BUN SERPL-MCNC: 19 MG/DL (ref 5–25)
CALCIUM SERPL-MCNC: 9.6 MG/DL (ref 8.3–10.1)
CHLORIDE SERPL-SCNC: 106 MMOL/L (ref 100–108)
CHOLEST SERPL-MCNC: 319 MG/DL (ref 50–200)
CLARITY UR: CLEAR
CO2 SERPL-SCNC: 25 MMOL/L (ref 21–32)
COLOR UR: YELLOW
CREAT SERPL-MCNC: 1.13 MG/DL (ref 0.6–1.3)
EOSINOPHIL # BLD AUTO: 0.11 THOUSAND/ΜL (ref 0–0.61)
EOSINOPHIL NFR BLD AUTO: 2 % (ref 0–6)
ERYTHROCYTE [DISTWIDTH] IN BLOOD BY AUTOMATED COUNT: 12.6 % (ref 11.6–15.1)
EST. AVERAGE GLUCOSE BLD GHB EST-MCNC: 131 MG/DL
GFR SERPL CREATININE-BSD FRML MDRD: 75 ML/MIN/1.73SQ M
GLUCOSE P FAST SERPL-MCNC: 107 MG/DL (ref 65–99)
GLUCOSE UR STRIP-MCNC: NEGATIVE MG/DL
HBA1C MFR BLD: 6.2 %
HCT VFR BLD AUTO: 41.2 % (ref 36.5–49.3)
HDLC SERPL-MCNC: 50 MG/DL
HGB BLD-MCNC: 13.4 G/DL (ref 12–17)
HGB UR QL STRIP.AUTO: NEGATIVE
IMM GRANULOCYTES # BLD AUTO: 0.03 THOUSAND/UL (ref 0–0.2)
IMM GRANULOCYTES NFR BLD AUTO: 1 % (ref 0–2)
KETONES UR STRIP-MCNC: NEGATIVE MG/DL
LDLC SERPL CALC-MCNC: 232 MG/DL (ref 0–100)
LEUKOCYTE ESTERASE UR QL STRIP: NEGATIVE
LYMPHOCYTES # BLD AUTO: 1.35 THOUSANDS/ΜL (ref 0.6–4.47)
LYMPHOCYTES NFR BLD AUTO: 26 % (ref 14–44)
MCH RBC QN AUTO: 30.1 PG (ref 26.8–34.3)
MCHC RBC AUTO-ENTMCNC: 32.5 G/DL (ref 31.4–37.4)
MCV RBC AUTO: 93 FL (ref 82–98)
MONOCYTES # BLD AUTO: 0.57 THOUSAND/ΜL (ref 0.17–1.22)
MONOCYTES NFR BLD AUTO: 11 % (ref 4–12)
NEUTROPHILS # BLD AUTO: 3.19 THOUSANDS/ΜL (ref 1.85–7.62)
NEUTS SEG NFR BLD AUTO: 59 % (ref 43–75)
NITRITE UR QL STRIP: NEGATIVE
NONHDLC SERPL-MCNC: 269 MG/DL
NRBC BLD AUTO-RTO: 0 /100 WBCS
PH UR STRIP.AUTO: 6 [PH]
PLATELET # BLD AUTO: 281 THOUSANDS/UL (ref 149–390)
PMV BLD AUTO: 10.6 FL (ref 8.9–12.7)
POTASSIUM SERPL-SCNC: 4.4 MMOL/L (ref 3.5–5.3)
PROT SERPL-MCNC: 8.1 G/DL (ref 6.4–8.2)
PROT UR STRIP-MCNC: NEGATIVE MG/DL
PSA SERPL-MCNC: 0.5 NG/ML (ref 0–4)
RBC # BLD AUTO: 4.45 MILLION/UL (ref 3.88–5.62)
SODIUM SERPL-SCNC: 138 MMOL/L (ref 136–145)
SP GR UR STRIP.AUTO: 1.02 (ref 1–1.03)
TRIGL SERPL-MCNC: 186 MG/DL
TSH SERPL DL<=0.05 MIU/L-ACNC: 4.49 UIU/ML (ref 0.36–3.74)
UROBILINOGEN UR QL STRIP.AUTO: 0.2 E.U./DL
WBC # BLD AUTO: 5.29 THOUSAND/UL (ref 4.31–10.16)

## 2021-04-22 PROCEDURE — 3725F SCREEN DEPRESSION PERFORMED: CPT | Performed by: PHYSICIAN ASSISTANT

## 2021-04-22 PROCEDURE — 4004F PT TOBACCO SCREEN RCVD TLK: CPT | Performed by: PHYSICIAN ASSISTANT

## 2021-04-22 PROCEDURE — G0103 PSA SCREENING: HCPCS

## 2021-04-22 PROCEDURE — 80061 LIPID PANEL: CPT

## 2021-04-22 PROCEDURE — 85025 COMPLETE CBC W/AUTO DIFF WBC: CPT

## 2021-04-22 PROCEDURE — 80053 COMPREHEN METABOLIC PANEL: CPT

## 2021-04-22 PROCEDURE — 99214 OFFICE O/P EST MOD 30 MIN: CPT | Performed by: PHYSICIAN ASSISTANT

## 2021-04-22 PROCEDURE — 84443 ASSAY THYROID STIM HORMONE: CPT

## 2021-04-22 PROCEDURE — 83036 HEMOGLOBIN GLYCOSYLATED A1C: CPT

## 2021-04-22 PROCEDURE — 87389 HIV-1 AG W/HIV-1&-2 AB AG IA: CPT

## 2021-04-22 PROCEDURE — 36415 COLL VENOUS BLD VENIPUNCTURE: CPT

## 2021-04-22 PROCEDURE — 81003 URINALYSIS AUTO W/O SCOPE: CPT | Performed by: PHYSICIAN ASSISTANT

## 2021-04-22 RX ORDER — ACETAMINOPHEN 325 MG/1
650 TABLET ORAL EVERY 6 HOURS PRN
COMMUNITY

## 2021-04-22 RX ORDER — EZETIMIBE 10 MG/1
10 TABLET ORAL DAILY
Qty: 90 TABLET | Refills: 3 | Status: SHIPPED | OUTPATIENT
Start: 2021-04-22 | End: 2021-07-21

## 2021-04-22 RX ORDER — IBUPROFEN 200 MG
TABLET ORAL EVERY 6 HOURS PRN
COMMUNITY

## 2021-04-22 NOTE — PROGRESS NOTES
Assessment/Plan: will start on Zetia  He will check with insurance company whether or not brand necessary Crestor will be paid for  Getting annual screening lab  He is  Interested in losing weight  Referred to weight management       Diagnoses and all orders for this visit:    Hyperlipidemia, unspecified hyperlipidemia type  -     ezetimibe (ZETIA) 10 mg tablet; Take 1 tablet (10 mg total) by mouth daily  -     CBC and differential; Future  -     Comprehensive metabolic panel; Future  -     Hemoglobin A1C; Future  -     Lipid panel; Future  -     UA w Reflex to Microscopic w Reflex to Culture  -     TSH, 3rd generation; Future  -     PSA, Total Screen; Future    Encounter for immunization    Screening for HIV (human immunodeficiency virus)  -     HIV 1/2 Antigen/Antibody (4th Generation) w Reflex [facility]; Future    Screening for colorectal cancer  -     Ambulatory referral to Gastroenterology; Future    Hypercholesterolemia  -     CBC and differential; Future  -     Comprehensive metabolic panel; Future  -     Hemoglobin A1C; Future  -     Lipid panel; Future  -     UA w Reflex to Microscopic w Reflex to Culture  -     TSH, 3rd generation; Future  -     PSA, Total Screen; Future    Tendonitis  -     CBC and differential; Future  -     Comprehensive metabolic panel; Future  -     Hemoglobin A1C; Future  -     Lipid panel; Future  -     UA w Reflex to Microscopic w Reflex to Culture  -     TSH, 3rd generation; Future  -     PSA, Total Screen; Future    Obesity due to excess calories without serious comorbidity, unspecified classification  -     Ambulatory referral to Weight Management; Future    Other orders  -     acetaminophen (TYLENOL) 325 mg tablet; Take 650 mg by mouth every 6 (six) hours as needed for mild pain  -     ibuprofen (MOTRIN) 200 mg tablet; Take by mouth every 6 (six) hours as needed for mild pain        No problem-specific Assessment & Plan notes found for this encounter        BMI Counseling: BMI was not able to be calculated due to patient refusing height and/or weight  Tobacco Cessation Counseling: Tobacco cessation counseling was not provided  The patient is sincerely urged to quit consumption of tobacco  He is not ready to quit tobacco         Subjective:      Patient ID: Chidi Pineda is a 49 y o  male  Is here for follow-up needs medication refill his insurance will not pay for Crestor  He gets joint pains when he takes generic Crestor or atorvastatin  Currently feeling well at asymptomatic  History of Hodgkin's disease indolent treated with bone marrow transplant  He denies shortness of breath chest pain palpitations dizziness nausea vomiting fever chills  He is interested in losing weight he is a smoker      The following portions of the patient's history were reviewed and updated as appropriate:   He has a past medical history of Hodgkin's lymphoma ([facility] [address] ) and Nephrolithiasis  ,  does not have any pertinent problems on file  ,   has a past surgical history that includes Bone marrow transplant (01/01/1991)  ,  family history includes Hypertension in his mother; Stroke in his father  ,   reports that he has been smoking cigarettes and cigars  He has never used smokeless tobacco  He reports current alcohol use  He reports that he does not use drugs  ,  is allergic to pollen extract     Current Outpatient Medications   Medication Sig Dispense Refill    acetaminophen (TYLENOL) 325 mg tablet Take 650 mg by mouth every 6 (six) hours as needed for mild pain      ibuprofen (MOTRIN) 200 mg tablet Take by mouth every 6 (six) hours as needed for mild pain      ezetimibe (ZETIA) 10 mg tablet Take 1 tablet (10 mg total) by mouth daily 90 tablet 3     No current facility-administered medications for this visit  Review of Systems   Constitutional: Negative for chills and fever  HENT: Negative for ear pain and sore throat  Eyes: Negative for pain and visual disturbance     Respiratory: Negative for cough and shortness of breath  Cardiovascular: Negative for chest pain and palpitations  Gastrointestinal: Negative for abdominal pain and vomiting  Genitourinary: Negative for dysuria and hematuria  Musculoskeletal: Negative for arthralgias and back pain  Skin: Negative for color change and rash  Neurological: Negative for seizures and syncope  All other systems reviewed and are negative  Objective:  Vitals:    04/22/21 0932   BP: 132/88   Pulse: 89   Temp: (!) 96 4 °F (35 8 °C)   TempSrc: Tympanic   SpO2: 95%   Weight: 129 kg (285 lb 3 2 oz)     Body mass index is 37 63 kg/m²  Physical Exam  Constitutional:       Appearance: He is well-developed  He is obese  HENT:      Head: Normocephalic  Right Ear: External ear normal       Left Ear: Tympanic membrane and external ear normal       Nose: Nose normal       Mouth/Throat:      Mouth: Mucous membranes are moist    Eyes:      Extraocular Movements: Extraocular movements intact  Conjunctiva/sclera: Conjunctivae normal       Pupils: Pupils are equal, round, and reactive to light  Neck:      Musculoskeletal: Normal range of motion and neck supple  Thyroid: No thyromegaly  Cardiovascular:      Rate and Rhythm: Normal rate and regular rhythm  Pulses: Normal pulses  Heart sounds: Normal heart sounds  Pulmonary:      Effort: Pulmonary effort is normal  No respiratory distress  Breath sounds: Normal breath sounds  No wheezing or rhonchi  Abdominal:      General: Abdomen is flat  Bowel sounds are normal       Palpations: Abdomen is soft  Musculoskeletal: Normal range of motion  General: No swelling  Skin:     General: Skin is warm and dry  Neurological:      General: No focal deficit present  Mental Status: He is alert and oriented to person, place, and time  Deep Tendon Reflexes: Reflexes are normal and symmetric     Psychiatric:         Mood and Affect: Mood normal          Thought Content:  Thought content normal          Judgment: Judgment normal

## 2021-04-23 LAB — HIV 1+2 AB+HIV1 P24 AG SERPL QL IA: NORMAL

## 2022-08-31 ENCOUNTER — HOSPITAL ENCOUNTER (EMERGENCY)
Facility: HOSPITAL | Age: 52
Discharge: HOME | End: 2022-08-31
Attending: EMERGENCY MEDICINE
Payer: COMMERCIAL

## 2022-08-31 ENCOUNTER — APPOINTMENT (OUTPATIENT)
Dept: RADIOLOGY | Facility: HOSPITAL | Age: 52
End: 2022-08-31
Payer: COMMERCIAL

## 2022-08-31 VITALS
HEART RATE: 81 BPM | RESPIRATION RATE: 15 BRPM | OXYGEN SATURATION: 97 % | DIASTOLIC BLOOD PRESSURE: 67 MMHG | SYSTOLIC BLOOD PRESSURE: 130 MMHG | TEMPERATURE: 98 F

## 2022-08-31 DIAGNOSIS — R55 SYNCOPE: Primary | ICD-10-CM

## 2022-08-31 DIAGNOSIS — R42 LIGHTHEADEDNESS: ICD-10-CM

## 2022-08-31 LAB
ANION GAP SERPL CALCULATED.3IONS-SCNC: 11 MMOL/L (ref 4–13)
ATRIAL RATE: 96 BPM
BASOPHILS # BLD AUTO: 0.04 THOUSANDS/ΜL (ref 0–0.1)
BASOPHILS NFR BLD AUTO: 1 % (ref 0–1)
BUN SERPL-MCNC: 27 MG/DL (ref 5–25)
CALCIUM SERPL-MCNC: 9.5 MG/DL (ref 8.3–10.1)
CHLORIDE SERPL-SCNC: 102 MMOL/L (ref 96–108)
CO2 SERPL-SCNC: 28 MMOL/L (ref 21–32)
CREAT SERPL-MCNC: 1.39 MG/DL (ref 0.6–1.3)
EOSINOPHIL # BLD AUTO: 0.09 THOUSAND/ΜL (ref 0–0.61)
EOSINOPHIL NFR BLD AUTO: 1 % (ref 0–6)
ERYTHROCYTE [DISTWIDTH] IN BLOOD BY AUTOMATED COUNT: 12.2 % (ref 11.6–15.1)
GFR SERPL CREATININE-BSD FRML MDRD: 58 ML/MIN/1.73SQ M
GLUCOSE SERPL-MCNC: 136 MG/DL (ref 65–140)
HCT VFR BLD AUTO: 39.9 % (ref 36.5–49.3)
HGB BLD-MCNC: 13.4 G/DL (ref 12–17)
IMM GRANULOCYTES # BLD AUTO: 0.02 THOUSAND/UL (ref 0–0.2)
IMM GRANULOCYTES NFR BLD AUTO: 0 % (ref 0–2)
LYMPHOCYTES # BLD AUTO: 1.01 THOUSANDS/ΜL (ref 0.6–4.47)
LYMPHOCYTES NFR BLD AUTO: 15 % (ref 14–44)
MCH RBC QN AUTO: 30.5 PG (ref 26.8–34.3)
MCHC RBC AUTO-ENTMCNC: 33.6 G/DL (ref 31.4–37.4)
MCV RBC AUTO: 91 FL (ref 82–98)
MONOCYTES # BLD AUTO: 0.46 THOUSAND/ΜL (ref 0.17–1.22)
MONOCYTES NFR BLD AUTO: 7 % (ref 4–12)
NEUTROPHILS # BLD AUTO: 5 THOUSANDS/ΜL (ref 1.85–7.62)
NEUTS SEG NFR BLD AUTO: 76 % (ref 43–75)
NRBC BLD AUTO-RTO: 0 /100 WBCS
P AXIS: 54 DEGREES
PLATELET # BLD AUTO: 255 THOUSANDS/UL (ref 149–390)
PMV BLD AUTO: 10.7 FL (ref 8.9–12.7)
POTASSIUM SERPL-SCNC: 4.2 MMOL/L (ref 3.5–5.3)
PR INTERVAL: 194 MS
QRS AXIS: -1 DEGREES
QRSD INTERVAL: 110 MS
QT INTERVAL: 388 MS
QTC INTERVAL: 490 MS
RBC # BLD AUTO: 4.39 MILLION/UL (ref 3.88–5.62)
SODIUM SERPL-SCNC: 141 MMOL/L (ref 135–147)
T WAVE AXIS: 83 DEGREES
VENTRICULAR RATE: 96 BPM
WBC # BLD AUTO: 6.62 THOUSAND/UL (ref 4.31–10.16)

## 2022-08-31 PROCEDURE — 85025 COMPLETE CBC W/AUTO DIFF WBC: CPT | Performed by: EMERGENCY MEDICINE

## 2022-08-31 PROCEDURE — 71046 X-RAY EXAM CHEST 2 VIEWS: CPT

## 2022-08-31 PROCEDURE — 80048 BASIC METABOLIC PNL TOTAL CA: CPT | Performed by: EMERGENCY MEDICINE

## 2022-08-31 PROCEDURE — 36415 COLL VENOUS BLD VENIPUNCTURE: CPT | Performed by: EMERGENCY MEDICINE

## 2022-08-31 PROCEDURE — 93010 ELECTROCARDIOGRAM REPORT: CPT | Performed by: INTERNAL MEDICINE

## 2022-08-31 PROCEDURE — 99284 EMERGENCY DEPT VISIT MOD MDM: CPT | Performed by: EMERGENCY MEDICINE

## 2022-08-31 PROCEDURE — 96361 HYDRATE IV INFUSION ADD-ON: CPT

## 2022-08-31 PROCEDURE — 93005 ELECTROCARDIOGRAM TRACING: CPT

## 2022-08-31 PROCEDURE — 96360 HYDRATION IV INFUSION INIT: CPT

## 2022-08-31 PROCEDURE — 99285 EMERGENCY DEPT VISIT HI MDM: CPT

## 2022-08-31 RX ADMIN — SODIUM CHLORIDE 1000 ML: 0.9 INJECTION, SOLUTION INTRAVENOUS at 14:41

## 2022-08-31 NOTE — ED PROVIDER NOTES
History  Chief Complaint   Patient presents with    Syncope     Brought in by EMS s/p syncopal episode while lying in bed after feeling dizzy in shower  EMS started #20G [address] with NSS infusing  Pt a&o3    50-year-old male history of lymphoma in remission since the early 90s presenting after syncopal event  Patient reports feeling lightheaded while in the shower was able to get into the bedroom and sit down the bed before he had a witnessed syncopal events  Patient reports lightheadedness and tunnel vision prior to the event  Remained on the bed did not fall or hit his head  Last approximately 30 seconds  Returned to baseline quickly  No postictal state  Denies any chest pain or shortness of breath or palpitations  Denies any abdominal pain or nausea vomiting diarrhea  Currently asymptomatic  Denies any other complaints  Past Medical History:  No date: Hodgkin's lymphoma ([facility] )  No date: Nephrolithiasis  Family History: non-contributory  Social History            Prior to Admission Medications   Prescriptions Last Dose Informant Patient Reported? Taking?   acetaminophen (TYLENOL) 325 mg tablet   Yes No   Sig: Take 650 mg by mouth every 6 (six) hours as needed for mild pain   ezetimibe (ZETIA) 10 mg tablet   No No   Sig: Take 1 tablet (10 mg total) by mouth daily   ibuprofen (MOTRIN) 200 mg tablet  Self Yes No   Sig: Take by mouth every 6 (six) hours as needed for mild pain      Facility-Administered Medications: None       Past Medical History:   Diagnosis Date    Hodgkin's lymphoma ([facility] )     Nephrolithiasis        Past Surgical History:   Procedure Laterality Date    BONE MARROW TRANSPLANT  01/01/1991 1991       Family History   Problem Relation Age of Onset    Hypertension Mother     Stroke Father      I have reviewed and agree with the history as documented      E-Cigarette/Vaping    E-Cigarette Use Never User      E-Cigarette/Vaping Substances     Social History     Tobacco Use    Smoking status: Light Tobacco Smoker     Types: Cigarettes, Cigars    Smokeless tobacco: Never Used    Tobacco comment: also states NEVER a smoker, per [employer]   Vaping Use    Vaping Use: Never used   Substance Use Topics    Alcohol use: Yes     Comment: social    Drug use: No       Review of Systems   Constitutional: Negative for appetite change, chills, diaphoresis, fever and unexpected weight change  HENT: Negative for congestion and rhinorrhea  Eyes: Negative for photophobia and visual disturbance  Respiratory: Negative for cough, chest tightness and shortness of breath  Cardiovascular: Negative for chest pain, palpitations and leg swelling  Gastrointestinal: Negative for abdominal distention, abdominal pain, blood in stool, constipation, diarrhea, nausea and vomiting  Genitourinary: Negative for dysuria and hematuria  Musculoskeletal: Negative for back pain, joint swelling, neck pain and neck stiffness  Skin: Negative for color change, pallor, rash and wound  Neurological: Positive for light-headedness  Negative for dizziness, syncope, weakness and headaches  Psychiatric/Behavioral: Negative for agitation  All other systems reviewed and are negative  Physical Exam  Physical Exam  Vitals and nursing note reviewed  Constitutional:       General: He is not in acute distress  Appearance: Normal appearance  He is well-developed  He is not ill-appearing, toxic-appearing or diaphoretic  HENT:      Head: Normocephalic and atraumatic  Nose: Nose normal  No congestion or rhinorrhea  Mouth/Throat:      Mouth: Mucous membranes are moist       Pharynx: Oropharynx is clear  No oropharyngeal exudate or posterior oropharyngeal erythema  Eyes:      General: No scleral icterus  Right eye: No discharge  Left eye: No discharge  Extraocular Movements: Extraocular movements intact        Conjunctiva/sclera: Conjunctivae normal       Pupils: Pupils are equal, round, and reactive to light  Neck:      Vascular: No JVD  Trachea: No tracheal deviation  Comments: Supple  Normal range of motion  Cardiovascular:      Rate and Rhythm: Normal rate and regular rhythm  Heart sounds: Normal heart sounds  No murmur heard  No friction rub  No gallop  Comments: Normal rate and regular rhythm  Pulmonary:      Effort: Pulmonary effort is normal  No respiratory distress  Breath sounds: Normal breath sounds  No stridor  No wheezing or rales  Comments: Clear to auscultation bilaterally  Chest:      Chest wall: No tenderness  Abdominal:      General: Bowel sounds are normal  There is no distension  Palpations: Abdomen is soft  Tenderness: There is no abdominal tenderness  There is no right CVA tenderness, left CVA tenderness, guarding or rebound  Comments: Soft, nontender, nondistended  Normal bowel sounds throughout   Musculoskeletal:         General: No swelling, tenderness, deformity or signs of injury  Normal range of motion  Cervical back: Normal range of motion and neck supple  No rigidity  No muscular tenderness  Right lower leg: No edema  Left lower leg: No edema  Lymphadenopathy:      Cervical: No cervical adenopathy  Skin:     General: Skin is warm and dry  Coloration: Skin is not pale  Findings: No erythema or rash  Neurological:      General: No focal deficit present  Mental Status: He is alert  Mental status is at baseline  Sensory: No sensory deficit  Motor: No weakness or abnormal muscle tone  Coordination: Coordination normal       Gait: Gait normal       Comments: Alert  Strength and sensation grossly intact  Ambulatory without difficulty at baseline  Psychiatric:         Behavior: Behavior normal          Thought Content:  Thought content normal          Vital Signs  ED Triage Vitals [08/31/22 1430]   Temperature Pulse Respirations Blood Pressure SpO2   98 °F (36 7 °C) 91 (!) 24 110/66 94 %      Temp Source Heart Rate Source Patient Position - Orthostatic VS BP Location FiO2 (%)   Oral Monitor Lying Right arm --      Pain Score       --           Vitals:    08/31/22 1430 08/31/22 1500 08/31/22 1600 08/31/22 1630   BP: 110/66 114/54 117/57 130/67   Pulse: 91 91 84 81   Patient Position - Orthostatic VS: Lying            Visual Acuity      ED Medications  Medications   sodium chloride 0 9 % bolus 1,000 mL (0 mL Intravenous Stopped 8/31/22 1642)       Diagnostic Studies  Results Reviewed     Procedure Component Value Units Date/Time    Basic metabolic panel [[id number]]  (Abnormal) Collected: 08/31/22 1440    Lab Status: Final result Specimen: Blood from Hand, Left Updated: 08/31/22 1456     Sodium 141 mmol/L      Potassium 4 2 mmol/L      Chloride 102 mmol/L      CO2 28 mmol/L      ANION GAP 11 mmol/L      BUN 27 mg/dL      Creatinine 1 39 mg/dL      Glucose 136 mg/dL      Calcium 9 5 mg/dL      eGFR 58 ml/min/1 73sq m     Narrative:      [place] guidelines for Chronic Kidney Disease (CKD):     Stage 1 with normal or high GFR (GFR > 90 mL/min/1 73 square meters)    Stage 2 Mild CKD (GFR = 60-89 mL/min/1 73 square meters)    Stage 3A Moderate CKD (GFR = 45-59 mL/min/1 73 square meters)    Stage 3B Moderate CKD (GFR = 30-44 mL/min/1 73 square meters)    Stage 4 Severe CKD (GFR = 15-29 mL/min/1 73 square meters)    Stage 5 End Stage CKD (GFR <15 mL/min/1 73 square meters)  Note: GFR calculation is accurate only with a steady state creatinine    CBC and differential [[id number]]  (Abnormal) Collected: 08/31/22 1440    Lab Status: Final result Specimen: Blood from Hand, Left Updated: 08/31/22 1445     WBC 6 62 Thousand/uL      RBC 4 39 Million/uL      Hemoglobin 13 4 g/dL      Hematocrit 39 9 %      MCV 91 fL      MCH 30 5 pg      MCHC 33 6 g/dL      RDW 12 2 %      MPV 10 7 fL      Platelets 117 Thousands/uL      nRBC 0 /100 WBCs      Neutrophils Relative 76 %      Immat GRANS % 0 %      Lymphocytes Relative 15 %      Monocytes Relative 7 %      Eosinophils Relative 1 %      Basophils Relative 1 %      Neutrophils Absolute 5 00 Thousands/µL      Immature Grans Absolute 0 02 Thousand/uL      Lymphocytes Absolute 1 01 Thousands/µL      Monocytes Absolute 0 46 Thousand/µL      Eosinophils Absolute 0 09 Thousand/µL      Basophils Absolute 0 04 Thousands/µL                  XR chest 2 views   Final Result by Stuart Cortez MD (08/31 0956)      No acute cardiopulmonary disease  Workstation performed: [id number]                    Procedures  Procedures         ED Course                               SBIRT 22yo+    Flowsheet Row Most Recent Value   SBIRT (23 yo +)    In order to provide better care to our patients, we are screening all of our patients for alcohol and drug use  Would it be okay to ask you these screening questions? No Filed at: 08/31/2022 1436                    [facility]  Number of Diagnoses or Management Options  Lightheadedness  Syncope  Diagnosis management comments: 50-year-old male history of lymphoma in remission since the early 90s presenting after syncopal event  Plan for basic labs plus EKG and IV fluids  Chest x-ray  Reassess  EKG sinus rhythm with incomplete left bundle branch block and nonspecific ST changes  Labs no acute process  Chest x-ray no acute process  Symptoms improved after IV fluids and ambulatory without lightheadedness or syncope  Discussed results and recommendations  Advised follow up PCP  Medication recommendations  Given instructions and return precautions  Patient/family at bedside acknowledged understanding of all written and verbal instructions and return precautions  Discharged          Amount and/or Complexity of Data Reviewed  Clinical lab tests: reviewed and ordered  Tests in the radiology section of CPT®: reviewed and ordered  Tests in the medicine section of CPT®: reviewed and ordered  Review and summarize past medical records: yes  Independent visualization of images, tracings, or specimens: yes    Risk of Complications, Morbidity, and/or Mortality  Presenting problems: high  Diagnostic procedures: high  Management options: high    Patient Progress  Patient progress: improved      Disposition  Final diagnoses:   Syncope   Lightheadedness     Time reflects when diagnosis was documented in both MDM as applicable and the Disposition within this note     Time User Action Codes Description Comment    8/31/2022  2:28 PM Quique Adair Add [R55] Syncope     8/31/2022  2:28 PM Quique Adair Add [R42] [address] [facility]       ED Disposition     ED Disposition   Discharge    Condition   Stable    Date/Time   Wed Aug 31, 2022  2:28 PM    Comment   Slim Villanueva discharge to home/self care  Follow-up Information     Follow up With Specialties Details Why Aamir Young MD Internal Medicine Schedule an appointment as soon as possible for a visit in 1 week  [address]  [phone]            Patient's Medications   Discharge Prescriptions    No medications on file       No discharge procedures on file      PDMP Review     None          ED Provider  Electronically Signed by           Мария Carmichael MD  08/31/22 0107

## 2022-08-31 NOTE — DISCHARGE INSTRUCTIONS
Please follow up PCP  Please stable hydrated  Recommend tylenol 650 mg and ibuprofen 600 mg every 6 hours as needed for pain  Please return for severe chest pain, significant shortness of breath, severely worsening symptoms, or any other concerning signs or symptoms  Please refer to the following documents for additional instructions and return precautions

## 2023-05-01 ENCOUNTER — TELEPHONE (OUTPATIENT)
Dept: GASTROENTEROLOGY | Facility: CLINIC | Age: 53
End: 2023-05-01

## 2023-05-01 ENCOUNTER — APPOINTMENT (OUTPATIENT)
Age: 53
End: 2023-05-01

## 2023-05-01 ENCOUNTER — PREP FOR PROCEDURE (OUTPATIENT)
Dept: GASTROENTEROLOGY | Facility: CLINIC | Age: 53
End: 2023-05-01

## 2023-05-01 ENCOUNTER — OFFICE VISIT (OUTPATIENT)
Age: 53
End: 2023-05-01

## 2023-05-01 VITALS
SYSTOLIC BLOOD PRESSURE: 142 MMHG | HEART RATE: 85 BPM | TEMPERATURE: 97.6 F | DIASTOLIC BLOOD PRESSURE: 84 MMHG | HEIGHT: 73 IN | RESPIRATION RATE: 18 BRPM | BODY MASS INDEX: 36.58 KG/M2 | OXYGEN SATURATION: 96 % | WEIGHT: 276 LBS

## 2023-05-01 DIAGNOSIS — Z00.00 HEALTHCARE MAINTENANCE: ICD-10-CM

## 2023-05-01 DIAGNOSIS — Z00.00 HEALTHCARE MAINTENANCE: Primary | ICD-10-CM

## 2023-05-01 DIAGNOSIS — Z12.5 PROSTATE CANCER SCREENING: ICD-10-CM

## 2023-05-01 DIAGNOSIS — Z12.11 SCREENING FOR COLON CANCER: Primary | ICD-10-CM

## 2023-05-01 DIAGNOSIS — Z12.11 COLON CANCER SCREENING: ICD-10-CM

## 2023-05-01 LAB
ALBUMIN SERPL BCP-MCNC: 4.2 G/DL (ref 3.5–5)
ALP SERPL-CCNC: 77 U/L (ref 46–116)
ALT SERPL W P-5'-P-CCNC: 37 U/L (ref 12–78)
ANION GAP SERPL CALCULATED.3IONS-SCNC: 1 MMOL/L (ref 4–13)
AST SERPL W P-5'-P-CCNC: 27 U/L (ref 5–45)
BACTERIA UR QL AUTO: ABNORMAL /HPF
BASOPHILS # BLD AUTO: 0.04 THOUSANDS/ΜL (ref 0–0.1)
BASOPHILS NFR BLD AUTO: 1 % (ref 0–1)
BILIRUB SERPL-MCNC: 0.46 MG/DL (ref 0.2–1)
BILIRUB UR QL STRIP: NEGATIVE
BUN SERPL-MCNC: 22 MG/DL (ref 5–25)
CALCIUM SERPL-MCNC: 9.7 MG/DL (ref 8.3–10.1)
CHLORIDE SERPL-SCNC: 106 MMOL/L (ref 96–108)
CHOLEST SERPL-MCNC: 257 MG/DL
CLARITY UR: CLEAR
CO2 SERPL-SCNC: 29 MMOL/L (ref 21–32)
COLOR UR: ABNORMAL
CREAT SERPL-MCNC: 1.11 MG/DL (ref 0.6–1.3)
EOSINOPHIL # BLD AUTO: 0.13 THOUSAND/ΜL (ref 0–0.61)
EOSINOPHIL NFR BLD AUTO: 3 % (ref 0–6)
ERYTHROCYTE [DISTWIDTH] IN BLOOD BY AUTOMATED COUNT: 12.9 % (ref 11.6–15.1)
GFR SERPL CREATININE-BSD FRML MDRD: 75 ML/MIN/1.73SQ M
GLUCOSE P FAST SERPL-MCNC: 109 MG/DL (ref 65–99)
GLUCOSE UR STRIP-MCNC: NEGATIVE MG/DL
HCT VFR BLD AUTO: 41.1 % (ref 36.5–49.3)
HCV AB SER QL: NORMAL
HDLC SERPL-MCNC: 54 MG/DL
HGB BLD-MCNC: 13.3 G/DL (ref 12–17)
HGB UR QL STRIP.AUTO: NEGATIVE
IMM GRANULOCYTES # BLD AUTO: 0.02 THOUSAND/UL (ref 0–0.2)
IMM GRANULOCYTES NFR BLD AUTO: 0 % (ref 0–2)
KETONES UR STRIP-MCNC: NEGATIVE MG/DL
LDLC SERPL CALC-MCNC: 170 MG/DL (ref 0–100)
LEUKOCYTE ESTERASE UR QL STRIP: ABNORMAL
LYMPHOCYTES # BLD AUTO: 1.32 THOUSANDS/ΜL (ref 0.6–4.47)
LYMPHOCYTES NFR BLD AUTO: 25 % (ref 14–44)
MCH RBC QN AUTO: 30.5 PG (ref 26.8–34.3)
MCHC RBC AUTO-ENTMCNC: 32.4 G/DL (ref 31.4–37.4)
MCV RBC AUTO: 94 FL (ref 82–98)
MONOCYTES # BLD AUTO: 0.49 THOUSAND/ΜL (ref 0.17–1.22)
MONOCYTES NFR BLD AUTO: 9 % (ref 4–12)
MUCOUS THREADS UR QL AUTO: ABNORMAL
NEUTROPHILS # BLD AUTO: 3.25 THOUSANDS/ΜL (ref 1.85–7.62)
NEUTS SEG NFR BLD AUTO: 62 % (ref 43–75)
NITRITE UR QL STRIP: NEGATIVE
NON-SQ EPI CELLS URNS QL MICRO: ABNORMAL /HPF
NRBC BLD AUTO-RTO: 0 /100 WBCS
PH UR STRIP.AUTO: 6 [PH]
PLATELET # BLD AUTO: 275 THOUSANDS/UL (ref 149–390)
PMV BLD AUTO: 11.2 FL (ref 8.9–12.7)
POTASSIUM SERPL-SCNC: 4.3 MMOL/L (ref 3.5–5.3)
PROT SERPL-MCNC: 8.1 G/DL (ref 6.4–8.4)
PROT UR STRIP-MCNC: ABNORMAL MG/DL
PSA SERPL-MCNC: 0.8 NG/ML (ref 0–4)
RBC # BLD AUTO: 4.36 MILLION/UL (ref 3.88–5.62)
RBC #/AREA URNS AUTO: ABNORMAL /HPF
SODIUM SERPL-SCNC: 136 MMOL/L (ref 135–147)
SP GR UR STRIP.AUTO: 1.02 (ref 1–1.03)
T4 FREE SERPL-MCNC: 0.64 NG/DL (ref 0.76–1.46)
TRIGL SERPL-MCNC: 164 MG/DL
TSH SERPL DL<=0.05 MIU/L-ACNC: 5.13 UIU/ML (ref 0.45–4.5)
UROBILINOGEN UR STRIP-ACNC: <2 MG/DL
WBC # BLD AUTO: 5.25 THOUSAND/UL (ref 4.31–10.16)
WBC #/AREA URNS AUTO: ABNORMAL /HPF

## 2023-05-01 NOTE — TELEPHONE ENCOUNTER
Scheduled date of colonoscopy (as of today):7/17/23  Physician performing colonoscopy:RACHELE  Location of colonoscopy:[place]  Clearances: NA

## 2023-05-01 NOTE — PROGRESS NOTES
Assessment/Plan:       Diagnoses and all orders for this visit:    Healthcare maintenance  -     Ambulatory referral for colonoscopy; Future  -     Echo complete w/ contrast if indicated; Future  -     PSA, Total Screen; Future  -     Lipid Panel with Direct LDL reflex; Future  -     CBC and differential; Future  -     Hemoglobin A1C; Future  -     Comprehensive metabolic panel; Future  -     TSH, 3rd generation with Free T4 reflex; Future  -     UA (URINE) with reflex to Scope  -     Hepatitis C antibody; Future    Colon cancer screening  -     Ambulatory referral for colonoscopy; Future    Prostate cancer screening  -     PSA, Total Screen; Future                Subjective:      Patient ID: Hannah Ferro is a 47 y o  male  Healthcare maintenance visit of a 71-year-old    When he was 24 he was treated for Hodgkin's disease and has been considered a cure since then  It was stage IIb  Treatment was chemotherapy, RT, with associated bone marrow transplantation  About 1 week ago he had a transient episode of a sensation of nocturnal shortness of breath which has resolved and he has had no recurrence neither at rest nor with exertion  No chest pain, no fever no chills no sweats no cough  The following portions of the patient's history were reviewed and updated as appropriate:   He has a past medical history of Hodgkin's lymphoma ([facility] [address] ) and Nephrolithiasis  ,  does not have any pertinent problems on file  ,   has a past surgical history that includes Bone marrow transplant (01/01/1991)  ,  family history includes Hypertension in his mother; Stroke in his father  ,   reports that he has been smoking cigarettes and cigars  He has never used smokeless tobacco  He reports current alcohol use  He reports that he does not use drugs  ,  is allergic to pollen extract     No current outpatient medications on file  No current facility-administered medications for this visit         Review of Systems   Constitutional: Negative for chills and fever  HENT: Negative for ear pain and sore throat  Eyes: Negative for pain and visual disturbance  Respiratory: Negative for cough and shortness of breath  Cardiovascular: Negative for chest pain and palpitations  Gastrointestinal: Negative for abdominal pain and vomiting  Genitourinary: Negative for dysuria and hematuria  Musculoskeletal: Negative for arthralgias and back pain  Skin: Negative for color change and rash  Neurological: Negative for seizures and syncope  All other systems reviewed and are negative  Objective:  Vitals:    05/01/23 0808   BP: 142/84   Pulse: 85   Resp: 18   Temp: 97 6 °F (36 4 °C)   SpO2: 96%      Physical Exam  Constitutional:       Appearance: He is well-developed  HENT:      Head: Normocephalic and atraumatic  Eyes:      General: No scleral icterus  Pupils: Pupils are equal, round, and reactive to light  Neck:      Thyroid: No thyromegaly  Trachea: No tracheal deviation  Cardiovascular:      Rate and Rhythm: Normal rate and regular rhythm  Pulses:           Dorsalis pedis pulses are 1+ on the right side and 1+ on the left side  Posterior tibial pulses are 1+ on the right side and 1+ on the left side  Heart sounds: Murmur heard  No gallop  Pulmonary:      Effort: Pulmonary effort is normal  No respiratory distress  Breath sounds: No wheezing or rales  Abdominal:      Palpations: Abdomen is soft  Musculoskeletal:         General: No tenderness or deformity  Normal range of motion  Cervical back: Normal range of motion and neck supple  Skin:     General: Skin is warm and dry  Neurological:      Mental Status: He is alert and oriented to person, place, and time  Coordination: Coordination normal       Deep Tendon Reflexes: Reflexes are normal and symmetric  Patient Instructions   Patient who feels well    Heart murmur noted; this is a new finding  We will obtain "echocardiogram in addition to \"routine \"laboratory studies and colonoscopy referral     "

## 2023-05-01 NOTE — PATIENT INSTRUCTIONS
"Patient who feels well    Heart murmur noted; this is a new finding  We will obtain echocardiogram in addition to \"routine \"laboratory studies and colonoscopy referral   "

## 2023-05-02 ENCOUNTER — TELEPHONE (OUTPATIENT)
Age: 53
End: 2023-05-02

## 2023-05-02 LAB
EST. AVERAGE GLUCOSE BLD GHB EST-MCNC: 126 MG/DL
HBA1C MFR BLD: 6 %

## 2023-05-02 NOTE — TELEPHONE ENCOUNTER
----- Message from Fawn Pavon MD sent at 5/2/2023 10:18 AM EDT -----  Please call the patient regarding his abnormal result  His thyroid is borderline underactive  If he feels fine, he does not need any treatment, but it needs to be rechecked in about 6 months

## 2023-05-02 NOTE — TELEPHONE ENCOUNTER
----- Message from Vivienne Muñoz MD sent at 5/2/2023 10:18 AM EDT -----  Please call the patient regarding his abnormal result  His thyroid is borderline underactive  If he feels fine, he does not need any treatment, but it needs to be rechecked in about 6 months

## 2023-05-02 NOTE — TELEPHONE ENCOUNTER
Called pt   And was notified and asking what can her take for his chlosterol being that crestor is no longer available

## 2023-05-02 NOTE — TELEPHONE ENCOUNTER
Focal symptoms of an underactive thyroid or excessive fatigue, intolerant of cold weather, unexpected weight gain    I do not think brand-name Crestor exists anymore

## 2023-05-02 NOTE — TELEPHONE ENCOUNTER
I spoke to jony I gave him his results and providers message he also had a questions is there any symptoms that he should be feeling for an under active thyroid he noticed on my chart on his other labs that some results are in red is there any thing he should be concern about and he was taking crestor he would like to continue to take the original when he went on the generic he was experiencing joint pain now his insurance wont pay he is asking what can he take needs advise

## 2023-05-03 NOTE — TELEPHONE ENCOUNTER
"Tyesha Farmer, there is no longer any \"brain\" Lipitor  Lipitor is now generic atorvastatin manufactured by some [facility] or [facility]  If he wants it, I will send it    "

## 2023-05-05 DIAGNOSIS — E78.2 MIXED HYPERLIPIDEMIA: Primary | ICD-10-CM

## 2023-05-05 RX ORDER — ATORVASTATIN CALCIUM 20 MG/1
20 TABLET, FILM COATED ORAL DAILY
Qty: 90 TABLET | Refills: 3 | Status: SHIPPED | OUTPATIENT
Start: 2023-05-05 | End: 2023-05-27

## 2023-05-27 DIAGNOSIS — E78.2 MIXED HYPERLIPIDEMIA: ICD-10-CM

## 2023-05-27 RX ORDER — ATORVASTATIN CALCIUM 20 MG/1
TABLET, FILM COATED ORAL
Qty: 90 TABLET | Refills: 4 | Status: SHIPPED | OUTPATIENT
Start: 2023-05-27

## 2023-06-06 ENCOUNTER — HOSPITAL ENCOUNTER (OUTPATIENT)
Dept: NON INVASIVE DIAGNOSTICS | Facility: HOSPITAL | Age: 53
Discharge: HOME | End: 2023-06-06
Attending: INTERNAL MEDICINE
Payer: COMMERCIAL

## 2023-06-06 VITALS
DIASTOLIC BLOOD PRESSURE: 84 MMHG | HEIGHT: 73 IN | BODY MASS INDEX: 36.58 KG/M2 | HEART RATE: 86 BPM | SYSTOLIC BLOOD PRESSURE: 142 MMHG | WEIGHT: 276 LBS

## 2023-06-06 DIAGNOSIS — Z00.00 HEALTHCARE MAINTENANCE: ICD-10-CM

## 2023-06-06 LAB
AORTIC ROOT: 3.3 CM
APICAL FOUR CHAMBER EJECTION FRACTION: 45 %
ASCENDING AORTA: 3 CM
AV LVOT MEAN GRADIENT: 1 MMHG
AV LVOT PEAK GRADIENT: 3 MMHG
DOP CALC LVOT PEAK VEL VTI: 15.48 CM
DOP CALC LVOT PEAK VEL: 0.8 M/S
E WAVE DECELERATION TIME: 171 MS
FRACTIONAL SHORTENING: 17 % (ref 28–44)
INTERVENTRICULAR SEPTUM IN DIASTOLE (PARASTERNAL SHORT AXIS VIEW): 0.8 CM
INTERVENTRICULAR SEPTUM: 0.8 CM (ref 0.6–1.1)
LAAS-AP2: 19.9 CM2
LAAS-AP4: 17.5 CM2
LEFT ATRIUM SIZE: 3.3 CM
LEFT INTERNAL DIMENSION IN SYSTOLE: 5.2 CM (ref 2.1–4)
LEFT VENTRICLE DIASTOLIC VOLUME (MOD BIPLANE): 146 ML
LEFT VENTRICLE SYSTOLIC VOLUME (MOD BIPLANE): 83 ML
LEFT VENTRICULAR INTERNAL DIMENSION IN DIASTOLE: 6.3 CM (ref 3.5–6)
LEFT VENTRICULAR POSTERIOR WALL IN END DIASTOLE: 0.8 CM
LEFT VENTRICULAR STROKE VOLUME: 70 ML
LV EF: 43 %
LVSV (TEICH): 70 ML
MV E'TISSUE VEL-SEP: 5 CM/S
MV PEAK A VEL: 0.55 M/S
MV PEAK E VEL: 124 CM/S
MV STENOSIS PRESSURE HALF TIME: 50 MS
MV VALVE AREA P 1/2 METHOD: 4.4 CM2
RIGHT ATRIUM AREA SYSTOLE A4C: 14 CM2
RIGHT VENTRICLE ID DIMENSION: 3.9 CM
SL CV LEFT ATRIUM LENGTH A2C: 5.2 CM
SL CV LV EF: 43
SL CV PED ECHO LEFT VENTRICLE DIASTOLIC VOLUME (MOD BIPLANE) 2D: 200 ML
SL CV PED ECHO LEFT VENTRICLE SYSTOLIC VOLUME (MOD BIPLANE) 2D: 131 ML
TRICUSPID ANNULAR PLANE SYSTOLIC EXCURSION: 2 CM

## 2023-06-06 PROCEDURE — 93306 TTE W/DOPPLER COMPLETE: CPT

## 2023-06-06 PROCEDURE — 93306 TTE W/DOPPLER COMPLETE: CPT | Performed by: INTERNAL MEDICINE

## 2023-06-06 RX ADMIN — PERFLUTREN 3 ML/MIN: 6.52 INJECTION, SUSPENSION INTRAVENOUS at 10:05

## 2023-06-07 ENCOUNTER — TELEPHONE (OUTPATIENT)
Age: 53
End: 2023-06-07

## 2023-06-07 DIAGNOSIS — I42.9 CARDIOMYOPATHY, UNSPECIFIED TYPE (HCC): Primary | ICD-10-CM

## 2023-06-07 NOTE — TELEPHONE ENCOUNTER
I spoke to jony he is awre of his results providers message and referral he was also given the number for cardilogist

## 2023-06-07 NOTE — TELEPHONE ENCOUNTER
----- Message from Edy Álvarez MD sent at 6/7/2023  8:08 AM EDT -----  Please call the patient regarding his abnormal result  Echocardiogram shows that the heart is not pumping as strongly as it should  It is working at about two thirds range  I suspect this is a long-term effect of his prior treatment for cancer  He will need to see a heart doctor and I will place that consult

## 2023-06-27 ENCOUNTER — TELEPHONE (OUTPATIENT)
Dept: GASTROENTEROLOGY | Facility: CLINIC | Age: 53
End: 2023-06-27

## 2023-06-30 PROBLEM — Z00.00 HEALTHCARE MAINTENANCE: Status: RESOLVED | Noted: 2023-05-01 | Resolved: 2023-06-30

## 2023-06-30 PROBLEM — Z12.5 PROSTATE CANCER SCREENING: Status: RESOLVED | Noted: 2023-05-01 | Resolved: 2023-06-30

## 2023-07-05 ENCOUNTER — CONSULT (OUTPATIENT)
Dept: CARDIOLOGY CLINIC | Facility: CLINIC | Age: 53
End: 2023-07-05
Payer: COMMERCIAL

## 2023-07-05 VITALS
BODY MASS INDEX: 35.92 KG/M2 | SYSTOLIC BLOOD PRESSURE: 110 MMHG | DIASTOLIC BLOOD PRESSURE: 70 MMHG | HEART RATE: 94 BPM | WEIGHT: 271 LBS | OXYGEN SATURATION: 97 % | RESPIRATION RATE: 20 BRPM | HEIGHT: 73 IN

## 2023-07-05 DIAGNOSIS — Z83.79: ICD-10-CM

## 2023-07-05 DIAGNOSIS — E78.00 HYPERCHOLESTEROLEMIA: ICD-10-CM

## 2023-07-05 DIAGNOSIS — E78.2 MIXED HYPERLIPIDEMIA: ICD-10-CM

## 2023-07-05 DIAGNOSIS — I42.9 CARDIOMYOPATHY, UNSPECIFIED TYPE (HCC): Primary | ICD-10-CM

## 2023-07-05 PROCEDURE — 93000 ELECTROCARDIOGRAM COMPLETE: CPT | Performed by: INTERNAL MEDICINE

## 2023-07-05 PROCEDURE — 99244 OFF/OP CNSLTJ NEW/EST MOD 40: CPT | Performed by: INTERNAL MEDICINE

## 2023-07-05 RX ORDER — METOPROLOL SUCCINATE 50 MG/1
50 TABLET, EXTENDED RELEASE ORAL DAILY
Qty: 90 TABLET | Refills: 5 | Status: SHIPPED | OUTPATIENT
Start: 2023-07-05

## 2023-07-05 RX ORDER — LISINOPRIL 5 MG/1
5 TABLET ORAL DAILY
Qty: 90 TABLET | Refills: 5 | Status: SHIPPED | OUTPATIENT
Start: 2023-07-05

## 2023-07-05 RX ORDER — ATORVASTATIN CALCIUM 40 MG/1
40 TABLET, FILM COATED ORAL DAILY
Qty: 90 TABLET | Refills: 5 | Status: SHIPPED | OUTPATIENT
Start: 2023-07-05

## 2023-07-05 NOTE — PROGRESS NOTES
Patient ID: Jenny Wiseman is a 47 y.o. male. Plan:      Hypercholesterolemia  Given very high LDL and family history of early CAD with father having heart attack in his 46s, I would like to treat more aggressively and will increase the atorvastatin dose to 40 mg daily. Cardiomyopathy ([address])  Likely related to either prior Adriamycin or to radiation. I would like to recheck in 6 months. In the interim add small dose of beta-blocker and ACE inhibitor. Family history of ischemic bowel disease  See above comments. Follow up Plan/Other summary comments:  Return in about 6 months (around 1/5/2024). In the interim, metoprolol 50 mg succinate started daily, lisinopril 5 mg daily, CT calcium score is ordered, and repeat lipid profile and echo prior to return visit in 6 months. HPI:   Patient is seen today to establish care. He was referred by Dr. Molly Muhammad. Patient had a recent echocardiogram because of transient dyspnea. He has not had any further spells. Echo revealed ejection fraction 43% with global dysfunction. I am asked to comment further. In general the patient has not had any recent change in exertional tolerance. At approximately 19 he was diagnosed with Hodgkin's lymphoma. He was treated with chemotherapy and radiation including Adriamycin. There was a second round of treatment that also included Adriamycin after there was a recurrence. He also had a bone marrow transplant. 5 years after initial diagnosis he seemed free of lymphoma and has not had any further disease. He does not recall any echocardiograms over the years but certainly may have had them while he was undergoing treatment in the [place] area. Results for orders placed or performed in visit on 07/05/23   POCT ECG    Impression    Sinus rhythm at 87 bpm.  Right bundle branch block pattern. First-degree AV block. Voltage on the low side.          Most recent or relevant cardiac/vascular testing:    TTE 6/6/2023: LVEF 43% with global dysfunction. Past Surgical History:   Procedure Laterality Date   • BONE MARROW TRANSPLANT  01/01/1991 1991       Lipid Profile:   Lab Results   Component Value Date    CHOL 152 04/03/2015    TRIG 164 (H) 05/01/2023    TRIG 93 04/03/2015    HDL 54 05/01/2023    HDL 54 04/03/2015         Review of Systems   10  point ROS  was otherwise non pertinent or negative except as per HPI or as below. Gait: Normal.        Objective:     /70 (BP Location: Left arm, Patient Position: Sitting, Cuff Size: Large)   Pulse 94   Resp 20   Ht 6' 1" (1.854 m)   Wt 123 kg (271 lb)   SpO2 97%   BMI 35.75 kg/m²     PHYSICAL EXAM:    General:  Normal appearance in no distress. Eyes:  Anicteric. Oral mucosa:  Moist.  Neck:  No JVD. Carotid upstrokes are brisk without bruits. No masses. Chest:  Clear to auscultation. Cardiac:  No palpable PMI. Normal S1 and S2.  Grade 1 systolic murmur at the base. No gallop or rub. Abdomen:  Soft and nontender. No palpable organomegaly or aortic enlargement. Extremities:  No peripheral edema. Musculoskeletal:  Symmetric. Vascular:  Femoral pulses are brisk without bruits. Popliteal pulses are intact bilaterally. Pedal pulses are intact. Neuro:  Grossly symmetric. Psych:  Alert and oriented x3.         Current Outpatient Medications:   •  atorvastatin (LIPITOR) 40 mg tablet, Take 1 tablet (40 mg total) by mouth daily, Disp: 90 tablet, Rfl: 5  •  lisinopril (ZESTRIL) 5 mg tablet, Take 1 tablet (5 mg total) by mouth daily, Disp: 90 tablet, Rfl: 5  •  metoprolol succinate (TOPROL-XL) 50 mg 24 hr tablet, Take 1 tablet (50 mg total) by mouth daily, Disp: 90 tablet, Rfl: 5  Allergies   Allergen Reactions   • Pollen Extract Eye Swelling     Reports allergy to pollens during the summer     Past Medical History:   Diagnosis Date   • Cardiomyopathy ([address])    • Hodgkin's lymphoma ([address])    • Hyperlipidemia    • Nephrolithiasis            Social History Tobacco Use   Smoking Status Light Smoker   • Types: Cigars   Smokeless Tobacco Never   Tobacco Comments    also states NEVER a smoker, per [facility]

## 2023-07-05 NOTE — ASSESSMENT & PLAN NOTE
Likely related to either prior Adriamycin or to radiation. I would like to recheck in 6 months. In the interim add small dose of beta-blocker and ACE inhibitor.

## 2023-07-05 NOTE — ASSESSMENT & PLAN NOTE
Given very high LDL and family history of early CAD with father having heart attack in his 46s, I would like to treat more aggressively and will increase the atorvastatin dose to 40 mg daily.

## 2023-07-11 ENCOUNTER — HOSPITAL ENCOUNTER (OUTPATIENT)
Dept: CT IMAGING | Facility: CLINIC | Age: 53
Discharge: HOME | End: 2023-07-11
Payer: COMMERCIAL

## 2023-07-11 DIAGNOSIS — E78.2 MIXED HYPERLIPIDEMIA: ICD-10-CM

## 2023-07-11 PROCEDURE — G1004 CDSM NDSC: HCPCS

## 2023-07-11 PROCEDURE — 75571 CT HRT W/O DYE W/CA TEST: CPT

## 2023-07-14 NOTE — RESULT ENCOUNTER NOTE
Please call the patient regarding his abnormal result. High calcium score which confirms that it is appropriate to take a good dose of atorvastatin.

## 2023-07-17 ENCOUNTER — ANESTHESIA EVENT (OUTPATIENT)
Dept: GASTROENTEROLOGY | Facility: HOSPITAL | Age: 53
End: 2023-07-17

## 2023-07-17 ENCOUNTER — HOSPITAL ENCOUNTER (OUTPATIENT)
Dept: GASTROENTEROLOGY | Facility: HOSPITAL | Age: 53
Setting detail: OUTPATIENT SURGERY
Discharge: HOME | End: 2023-07-17
Attending: INTERNAL MEDICINE
Payer: COMMERCIAL

## 2023-07-17 ENCOUNTER — ANESTHESIA (OUTPATIENT)
Dept: GASTROENTEROLOGY | Facility: HOSPITAL | Age: 53
End: 2023-07-17

## 2023-07-17 VITALS
RESPIRATION RATE: 16 BRPM | HEART RATE: 72 BPM | BODY MASS INDEX: 35.65 KG/M2 | WEIGHT: 268.96 LBS | HEIGHT: 73 IN | SYSTOLIC BLOOD PRESSURE: 154 MMHG | DIASTOLIC BLOOD PRESSURE: 94 MMHG | TEMPERATURE: 97.6 F | OXYGEN SATURATION: 97 %

## 2023-07-17 DIAGNOSIS — Z12.11 SCREENING FOR COLON CANCER: ICD-10-CM

## 2023-07-17 PROCEDURE — 45385 COLONOSCOPY W/LESION REMOVAL: CPT | Performed by: INTERNAL MEDICINE

## 2023-07-17 PROCEDURE — 88305 TISSUE EXAM BY PATHOLOGIST: CPT | Performed by: SPECIALIST

## 2023-07-17 RX ORDER — PROPOFOL 10 MG/ML
INJECTION, EMULSION INTRAVENOUS AS NEEDED
Status: DISCONTINUED | OUTPATIENT
Start: 2023-07-17 | End: 2023-07-17

## 2023-07-17 RX ORDER — LIDOCAINE HYDROCHLORIDE 20 MG/ML
INJECTION, SOLUTION EPIDURAL; INFILTRATION; INTRACAUDAL; PERINEURAL AS NEEDED
Status: DISCONTINUED | OUTPATIENT
Start: 2023-07-17 | End: 2023-07-17

## 2023-07-17 RX ORDER — SODIUM CHLORIDE, SODIUM LACTATE, POTASSIUM CHLORIDE, CALCIUM CHLORIDE 600; 310; 30; 20 MG/100ML; MG/100ML; MG/100ML; MG/100ML
INJECTION, SOLUTION INTRAVENOUS CONTINUOUS PRN
Status: DISCONTINUED | OUTPATIENT
Start: 2023-07-17 | End: 2023-07-17

## 2023-07-17 RX ADMIN — LIDOCAINE HYDROCHLORIDE 100 MG: 20 INJECTION, SOLUTION EPIDURAL; INFILTRATION; INTRACAUDAL; PERINEURAL at 13:52

## 2023-07-17 RX ADMIN — PROPOFOL 50 MG: 10 INJECTION, EMULSION INTRAVENOUS at 13:58

## 2023-07-17 RX ADMIN — SODIUM CHLORIDE, SODIUM LACTATE, POTASSIUM CHLORIDE, AND CALCIUM CHLORIDE: .6; .31; .03; .02 INJECTION, SOLUTION INTRAVENOUS at 13:14

## 2023-07-17 RX ADMIN — PROPOFOL 100 MG: 10 INJECTION, EMULSION INTRAVENOUS at 13:53

## 2023-07-17 RX ADMIN — PROPOFOL 100 MG: 10 INJECTION, EMULSION INTRAVENOUS at 13:52

## 2023-07-17 RX ADMIN — PROPOFOL 50 MG: 10 INJECTION, EMULSION INTRAVENOUS at 14:02

## 2023-07-17 NOTE — H&P
History and Physical -  [facility]  Santi Elias 47 y.o. male MRN: [id number]      HPI: Santi Elias is a 47y.o. year old male who presents for screening colonoscopy      REVIEW OF SYSTEMS: Per the HPI, and otherwise unremarkable. Historical Information   Past Medical History:   Diagnosis Date   • Cardiomyopathy ([address])    • Hodgkin's lymphoma ([address])    • Hyperlipidemia    • Nephrolithiasis      Past Surgical History:   Procedure Laterality Date   • BONE MARROW TRANSPLANT  01/01/1991 1991     Social History   Social History     Substance and Sexual Activity   Alcohol Use Yes    Comment: social     Social History     Substance and Sexual Activity   Drug Use No     Social History     Tobacco Use   Smoking Status Light Smoker   • Types: Cigars   Smokeless Tobacco Never   Tobacco Comments    also states NEVER a smoker, per [facility]     Family History   Problem Relation Age of Onset   • Hypertension Mother    • Stroke Father        Meds/Allergies     (Not in a hospital admission)      Allergies   Allergen Reactions   • Pollen Extract Eye Swelling     Reports allergy to pollens during the summer       Objective     Blood pressure 132/70, pulse 79, temperature (!) 97.3 °F (36.3 °C), resp. rate 19, height 6' 1" (1.854 m), weight 122 kg (268 lb 15.4 oz), SpO2 98 %. PHYSICAL EXAM    Gen: NAD  CV: RRR  CHEST: Clear  ABD: soft, NT/ND  EXT: no edema      ASSESSMENT/PLAN:  This is a 47y.o. year old male here for colonoscopy, and he is stable and optimized for his procedure.

## 2023-07-17 NOTE — ANESTHESIA PREPROCEDURE EVALUATION
Procedure:  COLONOSCOPY    Relevant Problems   CARDIO   (+) Hypercholesterolemia      Cardiovascular and Mediastinum   (+) Cardiomyopathy ([address])       Cardiomyopathy ([address])     • Hodgkin's lymphoma ([address])     • Hyperlipidemia     • Nephrolithiasis      •  Left Ventricle: Left ventricular cavity size is normal. Wall thickness is normal. The left ventricular ejection fraction is 43%. Systolic function is mildly reduced. There is mild- moderate global hypokinesis. Diastolic function is moderately abnormal, consistent with grade II (pseudonormal) relaxation. •  Aortic Valve: The aortic valve is trileaflet. The leaflets are mildly thickened. The leaflets are moderately calcified. The leaflets exhibit normal mobility. •  Mitral Valve: There is annular calcification. •  Aorta: The aortic root is normal in size. The aortic root exhibited mild fibrocalcific change. •  Pericardium: There is a small pericardial effusion circumferential to the heart. There is no echocardiographic evidence of tamponade    Physical Exam    Airway    Mallampati score: III  TM Distance: >3 FB  Neck ROM: full     Dental       Cardiovascular  Cardiovascular exam normal    Pulmonary  Pulmonary exam normal     Other Findings        Anesthesia Plan  ASA Score- 2     Anesthesia Type- IV sedation with anesthesia with ASA Monitors. Additional Monitors:   Airway Plan:           Plan Factors-Exercise tolerance (METS): >4 METS. Chart reviewed. EKG reviewed. Imaging results reviewed. Existing labs reviewed. Patient summary reviewed. Induction- intravenous. Postoperative Plan-     Informed Consent- Anesthetic plan and risks discussed with patient. I personally reviewed this patient with the CRNA. Discussed and agreed on the Anesthesia Plan with the CRNA. Salvatore Sanon

## 2023-07-17 NOTE — ANESTHESIA POSTPROCEDURE EVALUATION
Post-Op Assessment Note    CV Status:  Stable    Pain management: adequate     Mental Status:  Alert and awake   Hydration Status:  Euvolemic   PONV Controlled:  Controlled   Airway Patency:  Patent      Post Op Vitals Reviewed: Yes      Staff: Anesthesiologist, CRNA         There were no known notable events for this encounter.     /80 (07/17/23 1409)    Temp     Pulse 69 (07/17/23 1409)   Resp 16 (07/17/23 1409)    SpO2 94 % (07/17/23 1409)

## 2023-07-20 PROCEDURE — 88305 TISSUE EXAM BY PATHOLOGIST: CPT | Performed by: SPECIALIST

## 2024-01-02 ENCOUNTER — APPOINTMENT (OUTPATIENT)
Dept: LAB | Facility: HOSPITAL | Age: 54
End: 2024-01-02
Payer: COMMERCIAL

## 2024-01-02 DIAGNOSIS — E78.2 MIXED HYPERLIPIDEMIA: ICD-10-CM

## 2024-01-02 LAB
CHOLEST SERPL-MCNC: 174 MG/DL
HDLC SERPL-MCNC: 53 MG/DL
LDLC SERPL CALC-MCNC: 91 MG/DL (ref 0–100)
NONHDLC SERPL-MCNC: 121 MG/DL
TRIGL SERPL-MCNC: 151 MG/DL

## 2024-01-02 PROCEDURE — 36415 COLL VENOUS BLD VENIPUNCTURE: CPT

## 2024-01-02 PROCEDURE — 80061 LIPID PANEL: CPT

## 2024-01-16 ENCOUNTER — HOSPITAL ENCOUNTER (OUTPATIENT)
Dept: NON INVASIVE DIAGNOSTICS | Facility: HOSPITAL | Age: 54
Discharge: HOME | End: 2024-01-16
Payer: COMMERCIAL

## 2024-01-16 VITALS
BODY MASS INDEX: 35.52 KG/M2 | WEIGHT: 268 LBS | HEART RATE: 79 BPM | DIASTOLIC BLOOD PRESSURE: 94 MMHG | SYSTOLIC BLOOD PRESSURE: 154 MMHG | HEIGHT: 73 IN

## 2024-01-16 DIAGNOSIS — I42.9 CARDIOMYOPATHY, UNSPECIFIED TYPE (HCC): ICD-10-CM

## 2024-01-16 LAB
AORTIC ROOT: 3.5 CM
AORTIC VALVE MEAN VELOCITY: 13.2 M/S
APICAL FOUR CHAMBER EJECTION FRACTION: 39 %
ASCENDING AORTA: 2.7 CM
AV LVOT MEAN GRADIENT: 1 MMHG
AV LVOT PEAK GRADIENT: 2 MMHG
AV MEAN GRADIENT: 8 MMHG
AV PEAK GRADIENT: 13 MMHG
AV REGURGITATION PRESSURE HALF TIME: 341 MS
AV VELOCITY RATIO: 0.39
BSA FOR ECHO PROCEDURE: 2.44 M2
DOP CALC AO PEAK VEL: 1.78 M/S
DOP CALC AO VTI: 36.3 CM
DOP CALC LVOT PEAK VEL VTI: 16.13 CM
DOP CALC LVOT PEAK VEL: 0.7 M/S
E WAVE DECELERATION TIME: 156 MS
E/A RATIO: 0.98
FRACTIONAL SHORTENING: 22 (ref 28–44)
GLOBAL LONGITUIDAL STRAIN: -9 %
INTERVENTRICULAR SEPTUM IN DIASTOLE (PARASTERNAL SHORT AXIS VIEW): 1.1 CM
INTERVENTRICULAR SEPTUM: 1.1 CM (ref 0.6–1.1)
LAAS-AP2: 20.6 CM2
LAAS-AP4: 24.1 CM2
LEFT ATRIUM AREA SYSTOLE SINGLE PLANE A4C: 22.3 CM2
LEFT ATRIUM SIZE: 4.1 CM
LEFT ATRIUM VOLUME (MOD BIPLANE): 67 ML
LEFT ATRIUM VOLUME INDEX (MOD BIPLANE): 27.5 ML/M2
LEFT INTERNAL DIMENSION IN SYSTOLE: 4.3 CM (ref 2.1–4)
LEFT VENTRICLE DIASTOLIC VOLUME (MOD BIPLANE): 189 ML
LEFT VENTRICLE DIASTOLIC VOLUME INDEX (MOD BIPLANE): 77.5 ML/M2
LEFT VENTRICLE SYSTOLIC VOLUME (MOD BIPLANE): 108 ML
LEFT VENTRICLE SYSTOLIC VOLUME INDEX (MOD BIPLANE): 44.3 ML/M2
LEFT VENTRICULAR INTERNAL DIMENSION IN DIASTOLE: 5.5 CM (ref 3.5–6)
LEFT VENTRICULAR POSTERIOR WALL IN END DIASTOLE: 1.1 CM
LEFT VENTRICULAR STROKE VOLUME: 64 ML
LV EF: 43 %
LVSV (TEICH): 64 ML
MV E'TISSUE VEL-SEP: 4 CM/S
MV PEAK A VEL: 1.07 M/S
MV PEAK E VEL: 105 CM/S
MV STENOSIS PRESSURE HALF TIME: 45 MS
MV VALVE AREA P 1/2 METHOD: 4.89
RIGHT ATRIUM AREA SYSTOLE A4C: 16 CM2
RIGHT VENTRICLE ID DIMENSION: 3.4 CM
SL CV AV DECELERATION TIME RETROGRADE: 1177 MS
SL CV AV PEAK GRADIENT RETROGRADE: 78 MMHG
SL CV LEFT ATRIUM LENGTH A2C: 5.8 CM
SL CV LV EF: 35
SL CV PED ECHO LEFT VENTRICLE DIASTOLIC VOLUME (MOD BIPLANE) 2D: 149 ML
SL CV PED ECHO LEFT VENTRICLE SYSTOLIC VOLUME (MOD BIPLANE) 2D: 85 ML
TRICUSPID ANNULAR PLANE SYSTOLIC EXCURSION: 2.3 CM

## 2024-01-16 PROCEDURE — 93306 TTE W/DOPPLER COMPLETE: CPT | Performed by: INTERNAL MEDICINE

## 2024-01-16 PROCEDURE — 93306 TTE W/DOPPLER COMPLETE: CPT

## 2024-02-06 ENCOUNTER — OFFICE VISIT (OUTPATIENT)
Dept: CARDIOLOGY CLINIC | Facility: CLINIC | Age: 54
End: 2024-02-06
Payer: COMMERCIAL

## 2024-02-06 ENCOUNTER — TELEPHONE (OUTPATIENT)
Dept: CARDIOLOGY CLINIC | Facility: CLINIC | Age: 54
End: 2024-02-06

## 2024-02-06 VITALS
HEART RATE: 76 BPM | HEIGHT: 73 IN | WEIGHT: 278.8 LBS | DIASTOLIC BLOOD PRESSURE: 90 MMHG | SYSTOLIC BLOOD PRESSURE: 140 MMHG | BODY MASS INDEX: 36.95 KG/M2

## 2024-02-06 DIAGNOSIS — I42.0 DILATED CARDIOMYOPATHY (HCC): Primary | ICD-10-CM

## 2024-02-06 DIAGNOSIS — E78.00 HYPERCHOLESTEROLEMIA: ICD-10-CM

## 2024-02-06 PROCEDURE — 99214 OFFICE O/P EST MOD 30 MIN: CPT | Performed by: INTERNAL MEDICINE

## 2024-02-06 RX ORDER — SACUBITRIL AND VALSARTAN 49; 51 MG/1; MG/1
1 TABLET, FILM COATED ORAL 2 TIMES DAILY
Qty: 180 TABLET | Refills: 5 | Status: SHIPPED | OUTPATIENT
Start: 2024-02-06 | End: 2024-02-08 | Stop reason: SDUPTHER

## 2024-02-06 RX ORDER — SPIRONOLACTONE 25 MG/1
25 TABLET ORAL DAILY
Qty: 90 TABLET | Refills: 5 | Status: SHIPPED | OUTPATIENT
Start: 2024-02-06

## 2024-02-06 NOTE — PATIENT INSTRUCTIONS
Stop lisinopril.  Start Entresto otherwise known as sacubitril twice daily.  Start Farxiga which is a diabetes pill but helps heart issues once daily.  Start spironolactone 25 mg once daily.    Blood test 2 weeks or so after these meds are in place.

## 2024-02-06 NOTE — ASSESSMENT & PLAN NOTE
Persistent.  Again likely related to prior radiation or chemotherapy.    I would like to have him at least visit once with our cardio oncology specialist.  In addition I will switch his meds about to change from lisinopril to Entresto, add Farxiga, and add spironolactone.  BMP in 2 weeks.

## 2024-02-06 NOTE — PROGRESS NOTES
Patient ID: Marcelina Ascencio is a 53 y.o. male.        Plan:      Cardiomyopathy (HCC)  Persistent.  Again likely related to prior radiation or chemotherapy.    I would like to have him at least visit once with our cardio oncology specialist.  In addition I will switch his meds about to change from lisinopril to Entresto, add Farxiga, and add spironolactone.  BMP in 2 weeks.    Hypercholesterolemia  Now on good dose of Lipitor.     Follow up Plan/Other summary comments:  Return in about 6 months (around 8/6/2024).  BMP to be checked in 2 weeks given change in meds.  HPI:   Patient is seen in follow-up today regarding the above.  Since the last visit he has felt reasonably well.  He has very mild exertional dyspnea which is essentially unchanged from last visit and truly unchanged from the last few years.  No syncope or near syncope.  No chest pain.  No palpitations.      At approximately 19 he was diagnosed with Hodgkin's lymphoma.  He was treated with chemotherapy and radiation including Adriamycin.  There was a second round of treatment that also included Adriamycin after there was a recurrence.   He also had a bone marrow transplant.    5 years after initial diagnosis he seemed free of lymphoma and has not had any further disease.  He does not recall any echocardiograms over the years but certainly may have had them while he was undergoing treatment in the [place] area.        Most recent or relevant cardiac/vascular testing:    TTE 6/6/2023: LVEF 43% with global dysfunction.  TTE 1/16/2024 similar upon my review with reported LVEF 35%.    Calcium score 7/11/2023: 1665.    Past Surgical History:   Procedure Laterality Date    BONE MARROW TRANSPLANT  01/01/1991 1991       Lipid Profile:   Lab Results   Component Value Date    CHOL 152 04/03/2015    TRIG 151 (H) 01/02/2024    TRIG 93 04/03/2015    HDL 53 01/02/2024    HDL 54 04/03/2015         Review of Systems   10  point ROS  was otherwise non pertinent or  "negative except as per HPI or as below.   Gait:  Normal.        Objective:     /90 (BP Location: Right arm)   Pulse 76   Ht 6' 1\" (1.854 m)   Wt 126 kg (278 lb 12.8 oz)   BMI 36.78 kg/m²     PHYSICAL EXAM:    General:  Normal appearance in no distress.  Eyes:  Anicteric.  Oral mucosa:  Moist.  Neck:  No JVD. Carotid upstrokes are brisk without bruits.  No masses.  Chest:  Clear to auscultation.  Cardiac:  No palpable PMI.  Normal S1 and S2.  No murmur gallop or rub.  Abdomen:  Soft and nontender. No palpable organomegaly or aortic enlargement.  Extremities:  No peripheral edema.  Musculoskeletal:  Symmetric.   Vascular:  Femoral pulses are brisk without bruits.  Popliteal pulses are intact bilaterally.   Pedal pulses are intact.  Neuro:  Grossly symmetric.  Psych:  Alert and oriented x3.        Current Outpatient Medications:     atorvastatin (LIPITOR) 40 mg tablet, Take 1 tablet (40 mg total) by mouth daily, Disp: 90 tablet, Rfl: 5    dapagliflozin (Farxiga) 10 MG tablet, Take 1 tablet (10 mg total) by mouth daily, Disp: 90 tablet, Rfl: 5    metoprolol succinate (TOPROL-XL) 50 mg 24 hr tablet, Take 1 tablet (50 mg total) by mouth daily, Disp: 90 tablet, Rfl: 5    sacubitril-valsartan (Entresto) 49-51 MG TABS, Take 1 tablet by mouth 2 (two) times a day, Disp: 180 tablet, Rfl: 5    spironolactone (ALDACTONE) 25 mg tablet, Take 1 tablet (25 mg total) by mouth daily, Disp: 90 tablet, Rfl: 5  Allergies   Allergen Reactions    Pollen Extract Eye Swelling     Reports allergy to pollens during the summer     Past Medical History:   Diagnosis Date    Cardiomyopathy (HCC)     Hodgkin's lymphoma (HCC)     Hypercholesterolemia     Hyperlipidemia     Nephrolithiasis            Social History     Tobacco Use   Smoking Status Light Smoker    Types: Cigars   Smokeless Tobacco Never   Tobacco Comments    also states NEVER a smoker, per [facility]             "

## 2024-02-06 NOTE — TELEPHONE ENCOUNTER
LMOM for patient to call back to establish care with Dr. Nayak (Cardio-Oncology.) Direct extension left to contact.

## 2024-02-08 DIAGNOSIS — I42.0 DILATED CARDIOMYOPATHY (HCC): ICD-10-CM

## 2024-02-08 RX ORDER — SACUBITRIL AND VALSARTAN 49; 51 MG/1; MG/1
1 TABLET, FILM COATED ORAL 2 TIMES DAILY
Qty: 180 TABLET | Refills: 3 | Status: SHIPPED | OUTPATIENT
Start: 2024-02-08

## 2024-02-21 PROBLEM — Z12.11 COLON CANCER SCREENING: Status: RESOLVED | Noted: 2018-08-28 | Resolved: 2024-02-21

## 2024-03-25 ENCOUNTER — LAB (OUTPATIENT)
Dept: LAB | Facility: HOSPITAL | Age: 54
End: 2024-03-25
Payer: COMMERCIAL

## 2024-03-25 DIAGNOSIS — I42.0 DILATED CARDIOMYOPATHY (HCC): ICD-10-CM

## 2024-03-25 LAB
ANION GAP SERPL CALCULATED.3IONS-SCNC: 5 MMOL/L (ref 4–13)
BUN SERPL-MCNC: 24 MG/DL (ref 5–25)
CALCIUM SERPL-MCNC: 9.9 MG/DL (ref 8.4–10.2)
CHLORIDE SERPL-SCNC: 105 MMOL/L (ref 96–108)
CO2 SERPL-SCNC: 30 MMOL/L (ref 21–32)
CREAT SERPL-MCNC: 1.1 MG/DL (ref 0.6–1.3)
GFR SERPL CREATININE-BSD FRML MDRD: 76 ML/MIN/1.73SQ M
GLUCOSE P FAST SERPL-MCNC: 118 MG/DL (ref 65–99)
POTASSIUM SERPL-SCNC: 5.5 MMOL/L (ref 3.5–5.3)
SODIUM SERPL-SCNC: 140 MMOL/L (ref 135–147)

## 2024-03-25 PROCEDURE — 36415 COLL VENOUS BLD VENIPUNCTURE: CPT

## 2024-03-25 PROCEDURE — 80048 BASIC METABOLIC PNL TOTAL CA: CPT

## 2024-03-27 ENCOUNTER — TELEPHONE (OUTPATIENT)
Dept: CARDIOLOGY CLINIC | Facility: CLINIC | Age: 54
End: 2024-03-27

## 2024-03-27 NOTE — TELEPHONE ENCOUNTER
----- Message from Keshav Butler MD sent at 3/27/2024 11:22 AM EDT -----  Potassium on the higher side.  Please have him stop the aldactone that I just added as this is the likely culprit.

## 2024-03-27 NOTE — RESULT ENCOUNTER NOTE
Potassium on the higher side.  Please have him stop the aldactone that I just added as this is the likely culprit.

## 2024-03-27 NOTE — TELEPHONE ENCOUNTER
Patient called back, he will stop it.  He wanted to know if that would cause him to be urinating blood. It has been happening the last 4 days. No burning. His PCP just retired and doesn't see urology. He has had kidney stones in the past. He starts urinating and doesn't see it until the end. It started in the morning and then by evening/night time it stops. Then again, starts like that the following day. He wanted to know if the high potassium can cause that? Please advise any thoughts.

## 2024-04-02 NOTE — TELEPHONE ENCOUNTER
Patient called back, he said it did stop but he will see urology. The urine was not dark just the tail end of it was dripping blood.

## 2024-04-22 NOTE — H&P (VIEW-ONLY)
Cardio-Oncology / Heart Failure Cardiology Clinic Note    Marcelina Ascencio 54 y.o. male   MRN: [id number]  Encounter: [id number]        Assessment / Plan:    # Cardio-Oncology Pertinent History  Hodgkin's lymphoma  Dx age 19  Initial treatment included - ABVD.     Relapse 1 year later, had MOPP and chest radiation.  Hx of bone marrow transplant 1991    # Cardiomyopathy  # HFrEF - chronic    ETIOLOGY:  - new dx 6/2023 with EF 43%.   Repeat echo 1/2024 with reported EF 35% (although 43% by biplane on this study)  - suspect 2/2 prior anthracycline therapy +/- XRT +/- HTN  - recommend ischemic eval (significant coronary calcification, so needs LHC)  - if ischemic eval negative, could then check MRI  - check:  iron studies, BNP, magnesium    VOLUME:  - not requiring diuretic  - Exam - euvolemic  - check baseline BNP    GDMT:  - toprol 50mg daily --> increase to BID  - entresto 49-51 BID  - spironlactone caused hyperkalemia and was DC'd  - dapagliflozin 10mg daily    DEVICE:  - has RBBB  - optimize GDMT and then repeat echo to re-eval EF      # small-moderate pericardial effusion  Seen on 2023 and 2024 echo.  ? Due to prior radiation  No evidence of tamponade  Will need serial f/u of this going forward    # Radiation heart disease  Echo shows significant MAC.  The aortic valve is calcified and there is mild to moderate AI and mild AS.  Will need serial follow-up of valve disease as it is likely to progress    # HTN  See GDMT above.  Increase BB today.    # HLD  Lipitor 40mg daily  Last LDL 91        Today's Plan Summary:  See above assessment/plan for full details of today's plan.  Briefly,     Arrange for coronary angiogram  Check iron, magnesium, BNP  Uptitrate toprol                Reason For Visit / Chief Complaint:  Referred by Dr. Butler (general cardiology) for a new patient visit for dilated cardiomyopathy and history of cancer therapies for lymphoma    HPI:   Marcelina Ascencio is a 54 y.o.  male with history as noted in the  problem list and further detailed in the above assessment and plan.    Referred by Dr. Butler (general cardiology) for a new patient visit for dilated cardiomyopathy and history of cancer therapies for lymphoma    As above, the patient has a distant history of lymphoma.  Treatment included anthracycline based chemotherapy and radiation.  He also had a bone marrow transplant.  In June 2023 he had some shortness of breath for which an echo was obtained and demonstrating an EF of 43%.  This was a new diagnosis.  He was referred to cardiology and started on beta-blocker/ACE.  A repeat echo in January 2024 reported an EF down to 35% (although the biplane measurement was 43%).  Given his history of anthracycline and radiation he was referred to cardio-oncology/heart failure clinic.    Today, the patient reports -  no chest pain.   No SOB at rest.  Does get BARRERA.  No palpitations.  No recent syncope.   No leg edema.   No orthopnea or PND.    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   .  Had a daughter.    Smokes cigars.  Rare ETOH.  No drugs.        Cardiac Imaging personally reviewed:  EKG 7-5-23  Sinus rhythm with first-degree AV block  Right bundle branch block    4-23-24  Sinus rhythm with first-degree AV block  Right bundle branch block       Holter or event monitor    Echo 6-6-23  EF 43%  Small pericardial effusion    1-16-24  LVIDd 5.5cm.   EF 35% per report (43% by biplane)  Normal RV size and function  Mild AS.  Mild-mod AI  Small-moderate pericardial effusion, no tamponade         [facility]    Cardiac MRI    Stress testing    Coronary CTA or [facility]    RHC    CPET              Patient Active Problem List    Diagnosis Date Noted   • Chronic systolic heart failure (HCC) 04/23/2024   • Pericardial effusion 04/23/2024   • Radiation-induced heart disease 04/23/2024   • Primary hypertension 04/23/2024   • Mixed hyperlipidemia 04/23/2024   • Cardiomyopathy (HCC) 07/05/2023   • Family history of ischemic bowel  disease 07/05/2023   • Plantar fasciitis 08/28/2018   • Mononeuropathy 11/04/2016   • Hypercholesterolemia 04/09/2014       Past Medical History:   Diagnosis Date   • Cardiomyopathy (HCC)    • Hodgkin's lymphoma (HCC)    • Hypercholesterolemia    • Hyperlipidemia    • Nephrolithiasis        Review of Systems   Constitutional:  Negative for chills and fever.   HENT:  Positive for nosebleeds.    Gastrointestinal:  Negative for abdominal distention, abdominal pain and blood in stool.   Neurological:  Negative for dizziness and syncope.   Psychiatric/Behavioral:  Negative for confusion.    14-point ROS completed and negative except as stated above and/or in the HPI.    Allergies   Allergen Reactions   • Pollen Extract Eye Swelling     Reports allergy to pollens during the summer       Current Outpatient Medications   Medication Instructions   • atorvastatin (LIPITOR) 40 mg, Oral, Daily   • dapagliflozin (FARXIGA) 10 mg, Oral, Daily   • metoprolol succinate (TOPROL-XL) 50 mg, Oral, 2 times daily   • sacubitril-valsartan (Entresto) 49-51 MG TABS 1 tablet, Oral, 2 times daily       Social History     Socioeconomic History   • Marital status: /Civil Union     Spouse name: Not on file   • Number of children: Not on file   • Years of education: Not on file   • Highest education level: Not on file   Occupational History   • Not on file   Tobacco Use   • Smoking status: Light Smoker     Types: Cigars   • Smokeless tobacco: Never   • Tobacco comments:     also states NEVER a smoker, per [facility]   Vaping Use   • Vaping status: Never Used   Substance and Sexual Activity   • Alcohol use: Yes     Comment: social   • Drug use: No   • Sexual activity: Not on file   Other Topics Concern   • Not on file   Social History Narrative   • Not on file     Social Determinants of Health     Financial Resource Strain: Not on file   Food Insecurity: Not on file   Transportation Needs: Not on file   Physical Activity: Inactive  "(6/22/2019)    Exercise Vital Sign    • Days of Exercise per Week: 0 days    • Minutes of Exercise per Session: 0 min   Stress: No Stress Concern Present (5/1/2023)    [employer] [place] [employer] - Occupational Stress Questionnaire    • Feeling of Stress : Not at all   Social Connections: Not on file   Intimate Partner Violence: Not on file   Housing Stability: Not on file       Family History   Problem Relation Age of Onset   • Hypertension Mother    • Heart attack Father         age 59.  was a heavy drinker.       Physical Exam:  Blood pressure 138/78, pulse 74, height 6' 1\" (1.854 m), weight 127 kg (279 lb), SpO2 97%.  Body mass index is 36.81 kg/m².  Wt Readings from Last 3 Encounters:   04/23/24 127 kg (279 lb)   02/06/24 126 kg (278 lb 12.8 oz)   01/16/24 122 kg (268 lb)     Physical Exam  Vitals reviewed.   Constitutional:       General: He is not in acute distress.     Appearance: He is not toxic-appearing.   HENT:      Head: Normocephalic and atraumatic.   Eyes:      General: No scleral icterus.     Conjunctiva/sclera: Conjunctivae normal.   Neck:      Vascular: No carotid bruit.      Comments: No JVP elevation  Cardiovascular:      Rate and Rhythm: Normal rate and regular rhythm.      Heart sounds: Murmur (systolic at base) heard.      No friction rub. No gallop.   Pulmonary:      Breath sounds: Normal breath sounds. No wheezing, rhonchi or rales.   Abdominal:      General: There is no distension.      Palpations: Abdomen is soft.      Tenderness: There is no abdominal tenderness. There is no guarding.   Musculoskeletal:      Right lower leg: No edema.      Left lower leg: No edema.   Skin:     Coloration: Skin is not jaundiced or pale.      Findings: No erythema.   Neurological:      Mental Status: He is alert. Mental status is at baseline.   Psychiatric:         Mood and Affect: Mood normal.         Behavior: Behavior normal.         Labs & Results:  Lab Results   Component Value Date    " "SODIUM 140 03/25/2024    K 5.5 (H) 03/25/2024     03/25/2024    CO2 30 03/25/2024    BUN 24 03/25/2024    CREATININE 1.10 03/25/2024    GLUC 136 08/31/2022    CALCIUM 9.9 03/25/2024     No results found for: \"NTBNP\"       Time Statement:  I have spent a total time of 84 minutes on 04/23/24 in caring for this patient including Diagnostic results, Prognosis, Risks and benefits of tx options, Instructions for management, Patient and family education, Importance of tx compliance, Risk factor reductions, Impressions, Counseling / Coordination of care, Documenting in the medical record, Reviewing / ordering tests, medicine, procedures  , Obtaining or reviewing history  , and Communicating with other healthcare professionals .      Thank you for the opportunity to participate in the care of this patient.    Mayco Nayak MD, [facility]  Staff Cardiologist  Director of Cardio-Oncology  [facility]  "

## 2024-04-22 NOTE — PROGRESS NOTES
Cardio-Oncology / Heart Failure Cardiology Clinic Note    Marcelina Ascencio 54 y.o. male   MRN: [id number]  Encounter: [id number]        Assessment / Plan:    # Cardio-Oncology Pertinent History  Hodgkin's lymphoma  Dx age 19  Initial treatment included - ABVD.     Relapse 1 year later, had MOPP and chest radiation.  Hx of bone marrow transplant 1991    # Cardiomyopathy  # HFrEF - chronic    ETIOLOGY:  - new dx 6/2023 with EF 43%.   Repeat echo 1/2024 with reported EF 35% (although 43% by biplane on this study)  - suspect 2/2 prior anthracycline therapy +/- XRT +/- HTN  - recommend ischemic eval (significant coronary calcification, so needs LHC)  - if ischemic eval negative, could then check MRI  - check:  iron studies, BNP, magnesium    VOLUME:  - not requiring diuretic  - Exam - euvolemic  - check baseline BNP    GDMT:  - toprol 50mg daily --> increase to BID  - entresto 49-51 BID  - spironlactone caused hyperkalemia and was DC'd  - dapagliflozin 10mg daily    DEVICE:  - has RBBB  - optimize GDMT and then repeat echo to re-eval EF      # small-moderate pericardial effusion  Seen on 2023 and 2024 echo.  ? Due to prior radiation  No evidence of tamponade  Will need serial f/u of this going forward    # Radiation heart disease  Echo shows significant MAC.  The aortic valve is calcified and there is mild to moderate AI and mild AS.  Will need serial follow-up of valve disease as it is likely to progress    # HTN  See GDMT above.  Increase BB today.    # HLD  Lipitor 40mg daily  Last LDL 91        Today's Plan Summary:  See above assessment/plan for full details of today's plan.  Briefly,     Arrange for coronary angiogram  Check iron, magnesium, BNP  Uptitrate toprol                Reason For Visit / Chief Complaint:  Referred by Dr. Butler (general cardiology) for a new patient visit for dilated cardiomyopathy and history of cancer therapies for lymphoma    HPI:   Marcelina Ascencio is a 54 y.o.  male with history as noted in the  problem list and further detailed in the above assessment and plan.    Referred by Dr. Butler (general cardiology) for a new patient visit for dilated cardiomyopathy and history of cancer therapies for lymphoma    As above, the patient has a distant history of lymphoma.  Treatment included anthracycline based chemotherapy and radiation.  He also had a bone marrow transplant.  In June 2023 he had some shortness of breath for which an echo was obtained and demonstrating an EF of 43%.  This was a new diagnosis.  He was referred to cardiology and started on beta-blocker/ACE.  A repeat echo in January 2024 reported an EF down to 35% (although the biplane measurement was 43%).  Given his history of anthracycline and radiation he was referred to cardio-oncology/heart failure clinic.    Today, the patient reports -  no chest pain.   No SOB at rest.  Does get BARRERA.  No palpitations.  No recent syncope.   No leg edema.   No orthopnea or PND.    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   .  Had a daughter.    Smokes cigars.  Rare ETOH.  No drugs.        Cardiac Imaging personally reviewed:  EKG 7-5-23  Sinus rhythm with first-degree AV block  Right bundle branch block    4-23-24  Sinus rhythm with first-degree AV block  Right bundle branch block       Holter or event monitor    Echo 6-6-23  EF 43%  Small pericardial effusion    1-16-24  LVIDd 5.5cm.   EF 35% per report (43% by biplane)  Normal RV size and function  Mild AS.  Mild-mod AI  Small-moderate pericardial effusion, no tamponade         SOLANGE    Cardiac MRI    Stress testing    Coronary CTA or [facility]    RHC    CPET              Patient Active Problem List    Diagnosis Date Noted   • Chronic systolic heart failure (HCC) 04/23/2024   • Pericardial effusion 04/23/2024   • Radiation-induced heart disease 04/23/2024   • Primary hypertension 04/23/2024   • Mixed hyperlipidemia 04/23/2024   • Cardiomyopathy (HCC) 07/05/2023   • Family history of ischemic bowel  disease 07/05/2023   • Plantar fasciitis 08/28/2018   • Mononeuropathy 11/04/2016   • Hypercholesterolemia 04/09/2014       Past Medical History:   Diagnosis Date   • Cardiomyopathy (HCC)    • Hodgkin's lymphoma (HCC)    • Hypercholesterolemia    • Hyperlipidemia    • Nephrolithiasis        Review of Systems   Constitutional:  Negative for chills and fever.   HENT:  Positive for nosebleeds.    Gastrointestinal:  Negative for abdominal distention, abdominal pain and blood in stool.   Neurological:  Negative for dizziness and syncope.   Psychiatric/Behavioral:  Negative for confusion.    14-point ROS completed and negative except as stated above and/or in the HPI.    Allergies   Allergen Reactions   • Pollen Extract Eye Swelling     Reports allergy to pollens during the summer       Current Outpatient Medications   Medication Instructions   • atorvastatin (LIPITOR) 40 mg, Oral, Daily   • dapagliflozin (FARXIGA) 10 mg, Oral, Daily   • metoprolol succinate (TOPROL-XL) 50 mg, Oral, 2 times daily   • sacubitril-valsartan (Entresto) 49-51 MG TABS 1 tablet, Oral, 2 times daily       Social History     Socioeconomic History   • Marital status: /Civil Union     Spouse name: Not on file   • Number of children: Not on file   • Years of education: Not on file   • Highest education level: Not on file   Occupational History   • Not on file   Tobacco Use   • Smoking status: Light Smoker     Types: Cigars   • Smokeless tobacco: Never   • Tobacco comments:     also states NEVER a smoker, per [employer]   Vaping Use   • Vaping status: Never Used   Substance and Sexual Activity   • Alcohol use: Yes     Comment: social   • Drug use: No   • Sexual activity: Not on file   Other Topics Concern   • Not on file   Social History Narrative   • Not on file     Social Determinants of Health     Financial Resource Strain: Not on file   Food Insecurity: Not on file   Transportation Needs: Not on file   Physical Activity: Inactive  "(6/22/2019)    Exercise Vital Sign    • Days of Exercise per Week: 0 days    • Minutes of Exercise per Session: 0 min   Stress: No Stress Concern Present (5/1/2023)    [place] citizen [employer] - Occupational Stress Questionnaire    • Feeling of Stress : Not at all   Social Connections: Not on file   Intimate Partner Violence: Not on file   Housing Stability: Not on file       Family History   Problem Relation Age of Onset   • Hypertension Mother    • Heart attack Father         age 59.  was a heavy drinker.       Physical Exam:  Blood pressure 138/78, pulse 74, height 6' 1\" (1.854 m), weight 127 kg (279 lb), SpO2 97%.  Body mass index is 36.81 kg/m².  Wt Readings from Last 3 Encounters:   04/23/24 127 kg (279 lb)   02/06/24 126 kg (278 lb 12.8 oz)   01/16/24 122 kg (268 lb)     Physical Exam  Vitals reviewed.   Constitutional:       General: He is not in acute distress.     Appearance: He is not toxic-appearing.   HENT:      Head: Normocephalic and atraumatic.   Eyes:      General: No scleral icterus.     Conjunctiva/sclera: Conjunctivae normal.   Neck:      Vascular: No carotid bruit.      Comments: No JVP elevation  Cardiovascular:      Rate and Rhythm: Normal rate and regular rhythm.      Heart sounds: Murmur (systolic at base) heard.      No friction rub. No gallop.   Pulmonary:      Breath sounds: Normal breath sounds. No wheezing, rhonchi or rales.   Abdominal:      General: There is no distension.      Palpations: Abdomen is soft.      Tenderness: There is no abdominal tenderness. There is no guarding.   Musculoskeletal:      Right lower leg: No edema.      Left lower leg: No edema.   Skin:     Coloration: Skin is not jaundiced or pale.      Findings: No erythema.   Neurological:      Mental Status: He is alert. Mental status is at baseline.   Psychiatric:         Mood and Affect: Mood normal.         Behavior: Behavior normal.         Labs & Results:  Lab Results   Component Value Date    " "SODIUM 140 03/25/2024    K 5.5 (H) 03/25/2024     03/25/2024    CO2 30 03/25/2024    BUN 24 03/25/2024    CREATININE 1.10 03/25/2024    GLUC 136 08/31/2022    CALCIUM 9.9 03/25/2024     No results found for: \"NTBNP\"       Time Statement:  I have spent a total time of 84 minutes on 04/23/24 in caring for this patient including Diagnostic results, Prognosis, Risks and benefits of tx options, Instructions for management, Patient and family education, Importance of tx compliance, Risk factor reductions, Impressions, Counseling / Coordination of care, Documenting in the medical record, Reviewing / ordering tests, medicine, procedures  , Obtaining or reviewing history  , and Communicating with other healthcare professionals .      Thank you for the opportunity to participate in the care of this patient.    Mayco Nayak MD, [facility]  Staff Cardiologist  Director of Cardio-Oncology  [facility]  "

## 2024-04-23 ENCOUNTER — TELEPHONE (OUTPATIENT)
Dept: CARDIOLOGY CLINIC | Facility: CLINIC | Age: 54
End: 2024-04-23

## 2024-04-23 ENCOUNTER — PREP FOR PROCEDURE (OUTPATIENT)
Dept: CARDIOLOGY CLINIC | Facility: CLINIC | Age: 54
End: 2024-04-23

## 2024-04-23 ENCOUNTER — OFFICE VISIT (OUTPATIENT)
Dept: CARDIOLOGY CLINIC | Facility: CLINIC | Age: 54
End: 2024-04-23
Payer: COMMERCIAL

## 2024-04-23 VITALS
HEART RATE: 74 BPM | BODY MASS INDEX: 36.98 KG/M2 | HEIGHT: 73 IN | SYSTOLIC BLOOD PRESSURE: 138 MMHG | OXYGEN SATURATION: 97 % | DIASTOLIC BLOOD PRESSURE: 78 MMHG | WEIGHT: 279 LBS

## 2024-04-23 DIAGNOSIS — I31.39 PERICARDIAL EFFUSION: ICD-10-CM

## 2024-04-23 DIAGNOSIS — C81.90 HODGKIN LYMPHOMA, UNSPECIFIED HODGKIN LYMPHOMA TYPE, UNSPECIFIED BODY REGION (HCC): ICD-10-CM

## 2024-04-23 DIAGNOSIS — I51.9 RADIATION-INDUCED HEART DISEASE: ICD-10-CM

## 2024-04-23 DIAGNOSIS — E78.2 MIXED HYPERLIPIDEMIA: ICD-10-CM

## 2024-04-23 DIAGNOSIS — I42.0 DILATED CARDIOMYOPATHY (HCC): ICD-10-CM

## 2024-04-23 DIAGNOSIS — I50.22 CHRONIC SYSTOLIC HEART FAILURE (HCC): ICD-10-CM

## 2024-04-23 DIAGNOSIS — I10 PRIMARY HYPERTENSION: ICD-10-CM

## 2024-04-23 DIAGNOSIS — I42.0 DILATED CARDIOMYOPATHY (HCC): Primary | ICD-10-CM

## 2024-04-23 DIAGNOSIS — I50.22 CHRONIC SYSTOLIC HEART FAILURE (HCC): Primary | ICD-10-CM

## 2024-04-23 DIAGNOSIS — I42.9 CARDIOMYOPATHY, UNSPECIFIED TYPE (HCC): ICD-10-CM

## 2024-04-23 PROCEDURE — 99245 OFF/OP CONSLTJ NEW/EST HI 55: CPT | Performed by: INTERNAL MEDICINE

## 2024-04-23 PROCEDURE — 93000 ELECTROCARDIOGRAM COMPLETE: CPT | Performed by: INTERNAL MEDICINE

## 2024-04-23 RX ORDER — METOPROLOL SUCCINATE 50 MG/1
50 TABLET, EXTENDED RELEASE ORAL 2 TIMES DAILY
Qty: 180 TABLET | Refills: 3 | Status: SHIPPED | OUTPATIENT
Start: 2024-04-23

## 2024-04-23 NOTE — PATIENT INSTRUCTIONS
Recommend heart cath to check for coronary artery disease  HOLD farxiga for 4 days prior to heart cath  Check blood work  Increase metoprolol to 50mg twice daily

## 2024-04-23 NOTE — TELEPHONE ENCOUNTER
Called & spoke with patient. He would like to have the cath done in [place]. Amira are you able to reach out to this patient to have him scheduled?

## 2024-04-23 NOTE — TELEPHONE ENCOUNTER
----- Message from Mary Adams MA sent at 4/23/2024  9:58 AM EDT -----  Regarding: FW: this patient needs a left heart cath    ----- Message -----  From: Mayco Nayak MD  Sent: 4/23/2024   9:56 AM EDT  To: Cardiology Surgery Coordinator  Subject: this patient needs a left heart cath               Hello,    Seeing this patient today in oncology building.   Has heart failure.  Low EF. History of radiation to the heart.       Needs coronary angiogram.  Order placed.    Please reach out to patient to arrange.      Non-urgent.    Thanks  Mayco Nayak

## 2024-04-23 NOTE — LETTER
April 23, 2024     Keshav Butler MD  [address] [facility]  [address]  [place] [address]    Patient: Marcelina Ascencio   YOB: 1970   Date of Visit: 4/23/2024       Dear Dr. Butler:    Thank you for referring Marcelina Ascencio to me for evaluation. Below are my notes for this consultation.    If you have questions, please do not hesitate to call me. I look forward to following your patient along with you.         Sincerely,        Mayco Nayak MD        CC: MD Mayco Nicole MD  4/23/2024 10:10 AM  Sign when Signing Visit  Cardio-Oncology / Heart Failure Cardiology Clinic Note    Marcelina Ascencio 54 y.o. male   MRN: [id number]  Encounter: [id number]        Assessment / Plan:    # Cardio-Oncology Pertinent History  Hodgkin's lymphoma  Dx age 19  Initial treatment included - ABVD.     Relapse 1 year later, had MOPP and chest radiation.  Hx of bone marrow transplant 1991    # Cardiomyopathy  # HFrEF - chronic    ETIOLOGY:  - new dx 6/2023 with EF 43%.   Repeat echo 1/2024 with reported EF 35% (although 43% by biplane on this study)  - suspect 2/2 prior anthracycline therapy +/- XRT +/- HTN  - recommend ischemic eval (significant coronary calcification, so needs C)  - if ischemic eval negative, could then check MRI  - check:  iron studies, BNP, magnesium    VOLUME:  - not requiring diuretic  - Exam - euvolemic  - check baseline BNP    GDMT:  - toprol 50mg daily --> increase to BID  - entresto 49-51 BID  - spironlactone caused hyperkalemia and was DC'd  - dapagliflozin 10mg daily    DEVICE:  - has RBBB  - optimize GDMT and then repeat echo to re-eval EF      # small-moderate pericardial effusion  Seen on 2023 and 2024 echo.  ? Due to prior radiation  No evidence of tamponade  Will need serial f/u of this going forward    # Radiation heart disease  Echo shows significant MAC.  The aortic valve is calcified and there is mild to moderate AI and mild AS.  Will need serial follow-up of  valve disease as it is likely to progress    # HTN  See GDMT above.  Increase BB today.    # HLD  Lipitor 40mg daily  Last LDL 91        Today's Plan Summary:  See above assessment/plan for full details of today's plan.  Briefly,     Arrange for coronary angiogram  Check iron, magnesium, BNP  Uptitrate toprol                Reason For Visit / Chief Complaint:  Referred by Dr. Butler (general cardiology) for a new patient visit for dilated cardiomyopathy and history of cancer therapies for lymphoma    HPI:   Marcelina Ascencio is a 54 y.o.  male with history as noted in the problem list and further detailed in the above assessment and plan.    Referred by Dr. Butler (general cardiology) for a new patient visit for dilated cardiomyopathy and history of cancer therapies for lymphoma    As above, the patient has a distant history of lymphoma.  Treatment included anthracycline based chemotherapy and radiation.  He also had a bone marrow transplant.  In June 2023 he had some shortness of breath for which an echo was obtained and demonstrating an EF of 43%.  This was a new diagnosis.  He was referred to cardiology and started on beta-blocker/ACE.  A repeat echo in January 2024 reported an EF down to 35% (although the biplane measurement was 43%).  Given his history of anthracycline and radiation he was referred to cardio-oncology/heart failure clinic.    Today, the patient reports -  no chest pain.   No SOB at rest.  Does get BARRERA.  No palpitations.  No recent syncope.   No leg edema.   No orthopnea or PND.    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   .  Had a daughter.    Smokes cigars.  Rare ETOH.  No drugs.        Cardiac Imaging personally reviewed:  EKG 7-5-23  Sinus rhythm with first-degree AV block  Right bundle branch block    4-23-24  Sinus rhythm with first-degree AV block  Right bundle branch block       Holter or event monitor    Echo 6-6-23  EF 43%  Small pericardial effusion    1-16-24  LVIDd  5.5cm.   EF 35% per report (43% by biplane)  Normal RV size and function  Mild AS.  Mild-mod AI  Small-moderate pericardial effusion, no tamponade         SOLANGE    Cardiac MRI    Stress testing    Coronary CTA or [facility]    RHC    CPET              Patient Active Problem List    Diagnosis Date Noted   • Cardiomyopathy (HCC) 07/05/2023   • Family history of ischemic bowel disease 07/05/2023   • Plantar fasciitis 08/28/2018   • Mononeuropathy 11/04/2016   • Hypercholesterolemia 04/09/2014       Past Medical History:   Diagnosis Date   • Cardiomyopathy (HCC)    • Hodgkin's lymphoma (HCC)    • Hypercholesterolemia    • Hyperlipidemia    • Nephrolithiasis        Review of Systems   Constitutional:  Negative for chills and fever.   HENT:  Positive for nosebleeds.    Gastrointestinal:  Negative for abdominal distention, abdominal pain and blood in stool.   Neurological:  Negative for dizziness and syncope.   Psychiatric/Behavioral:  Negative for confusion.    14-point ROS completed and negative except as stated above and/or in the HPI.    Allergies   Allergen Reactions   • Pollen Extract Eye Swelling     Reports allergy to pollens during the summer       Current Outpatient Medications   Medication Instructions   • atorvastatin (LIPITOR) 40 mg, Oral, Daily   • dapagliflozin (FARXIGA) 10 mg, Oral, Daily   • metoprolol succinate (TOPROL-XL) 50 mg, Oral, 2 times daily   • sacubitril-valsartan (Entresto) 49-51 MG TABS 1 tablet, Oral, 2 times daily       Social History     Socioeconomic History   • Marital status: /Civil Union     Spouse name: Not on file   • Number of children: Not on file   • Years of education: Not on file   • Highest education level: Not on file   Occupational History   • Not on file   Tobacco Use   • Smoking status: Light Smoker     Types: Cigars   • Smokeless tobacco: Never   • Tobacco comments:     also states NEVER a smoker, per [employer]   Vaping Use   • Vaping status: Never Used   Substance and  "Sexual Activity   • Alcohol use: Yes     Comment: social   • Drug use: No   • Sexual activity: Not on file   Other Topics Concern   • Not on file   Social History Narrative   • Not on file     Social Determinants of Health     Financial Resource Strain: Not on file   Food Insecurity: Not on file   Transportation Needs: Not on file   Physical Activity: Inactive (6/22/2019)    Exercise Vital Sign    • Days of Exercise per Week: 0 days    • Minutes of Exercise per Session: 0 min   Stress: No Stress Concern Present (5/1/2023)    [place] [employer] - Occupational Stress Questionnaire    • Feeling of Stress : Not at all   Social Connections: Not on file   Intimate Partner Violence: Not on file   Housing Stability: Not on file       Family History   Problem Relation Age of Onset   • Hypertension Mother    • Heart attack Father         age 59.  was a heavy drinker.       Physical Exam:  Blood pressure 138/78, pulse 74, height 6' 1\" (1.854 m), weight 127 kg (279 lb), SpO2 97%.  Body mass index is 36.81 kg/m².  Wt Readings from Last 3 Encounters:   04/23/24 127 kg (279 lb)   02/06/24 126 kg (278 lb 12.8 oz)   01/16/24 122 kg (268 lb)     Physical Exam  Vitals reviewed.   Constitutional:       General: He is not in acute distress.     Appearance: He is not toxic-appearing.   HENT:      Head: Normocephalic and atraumatic.   Eyes:      General: No scleral icterus.     Conjunctiva/sclera: Conjunctivae normal.   Neck:      Vascular: No carotid bruit.      Comments: No JVP elevation  Cardiovascular:      Rate and Rhythm: Normal rate and regular rhythm.      Heart sounds: Murmur (systolic at base) heard.      No friction rub. No gallop.   Pulmonary:      Breath sounds: Normal breath sounds. No wheezing, rhonchi or rales.   Abdominal:      General: There is no distension.      Palpations: Abdomen is soft.      Tenderness: There is no abdominal tenderness. There is no guarding.   Musculoskeletal:      Right lower " "leg: No edema.      Left lower leg: No edema.   Skin:     Coloration: Skin is not jaundiced or pale.      Findings: No erythema.   Neurological:      Mental Status: He is alert. Mental status is at baseline.   Psychiatric:         Mood and Affect: Mood normal.         Behavior: Behavior normal.         Labs & Results:  Lab Results   Component Value Date    SODIUM 140 03/25/2024    K 5.5 (H) 03/25/2024     03/25/2024    CO2 30 03/25/2024    BUN 24 03/25/2024    CREATININE 1.10 03/25/2024    GLUC 136 08/31/2022    CALCIUM 9.9 03/25/2024     No results found for: \"NTBNP\"       Time Statement:  I have spent a total time of 84 minutes on 04/23/24 in caring for this patient including Diagnostic results, Prognosis, Risks and benefits of tx options, Instructions for management, Patient and family education, Importance of tx compliance, Risk factor reductions, Impressions, Counseling / Coordination of care, Documenting in the medical record, Reviewing / ordering tests, medicine, procedures  , Obtaining or reviewing history  , and Communicating with other healthcare professionals .      Thank you for the opportunity to participate in the care of this patient.    Mayco Nayak MD, [facility]  Staff Cardiologist  Director of Cardio-Oncology  [facility]   "

## 2024-04-24 ENCOUNTER — PREP FOR PROCEDURE (OUTPATIENT)
Dept: CARDIOLOGY CLINIC | Facility: CLINIC | Age: 54
End: 2024-04-24

## 2024-04-24 DIAGNOSIS — I50.22 CHRONIC SYSTOLIC HEART FAILURE (HCC): ICD-10-CM

## 2024-04-24 DIAGNOSIS — I42.9 CARDIOMYOPATHY, UNSPECIFIED TYPE (HCC): Primary | ICD-10-CM

## 2024-04-24 DIAGNOSIS — I51.9 RADIATION-INDUCED HEART DISEASE: ICD-10-CM

## 2024-04-24 DIAGNOSIS — I31.39 PERICARDIAL EFFUSION: ICD-10-CM

## 2024-04-24 NOTE — TELEPHONE ENCOUNTER
S/w pt. Advised of card cath scheduled for 5/7. Pt advised to hold farxiga 4 days prior and entresto morning of procedure. Pt advised to obtain labs ordered ASAP. Message sent to pt via [employer] with instructions provided over the phone. Pt verbalized understanding

## 2024-04-30 ENCOUNTER — LAB (OUTPATIENT)
Dept: LAB | Facility: HOSPITAL | Age: 54
End: 2024-04-30
Attending: INTERNAL MEDICINE
Payer: COMMERCIAL

## 2024-04-30 DIAGNOSIS — I50.22 CHRONIC SYSTOLIC HEART FAILURE (HCC): ICD-10-CM

## 2024-04-30 DIAGNOSIS — I42.9 CARDIOMYOPATHY, UNSPECIFIED TYPE (HCC): ICD-10-CM

## 2024-04-30 DIAGNOSIS — I51.9 RADIATION-INDUCED HEART DISEASE: ICD-10-CM

## 2024-04-30 DIAGNOSIS — I31.39 PERICARDIAL EFFUSION: ICD-10-CM

## 2024-04-30 LAB
ANION GAP SERPL CALCULATED.3IONS-SCNC: 6 MMOL/L (ref 4–13)
BNP SERPL-MCNC: 73 PG/ML (ref 0–100)
BUN SERPL-MCNC: 26 MG/DL (ref 5–25)
CALCIUM SERPL-MCNC: 9.9 MG/DL (ref 8.4–10.2)
CHLORIDE SERPL-SCNC: 104 MMOL/L (ref 96–108)
CO2 SERPL-SCNC: 29 MMOL/L (ref 21–32)
CREAT SERPL-MCNC: 1.04 MG/DL (ref 0.6–1.3)
ERYTHROCYTE [DISTWIDTH] IN BLOOD BY AUTOMATED COUNT: 12.8 % (ref 11.6–15.1)
FERRITIN SERPL-MCNC: 328 NG/ML (ref 24–336)
GFR SERPL CREATININE-BSD FRML MDRD: 81 ML/MIN/1.73SQ M
GLUCOSE P FAST SERPL-MCNC: 105 MG/DL (ref 65–99)
HCT VFR BLD AUTO: 42.8 % (ref 36.5–49.3)
HGB BLD-MCNC: 13.7 G/DL (ref 12–17)
INR PPP: 0.87 (ref 0.84–1.19)
IRON SATN MFR SERPL: 33 % (ref 15–50)
IRON SERPL-MCNC: 124 UG/DL (ref 50–212)
MAGNESIUM SERPL-MCNC: 2.2 MG/DL (ref 1.9–2.7)
MCH RBC QN AUTO: 30.4 PG (ref 26.8–34.3)
MCHC RBC AUTO-ENTMCNC: 32 G/DL (ref 31.4–37.4)
MCV RBC AUTO: 95 FL (ref 82–98)
PLATELET # BLD AUTO: 289 THOUSANDS/UL (ref 149–390)
PMV BLD AUTO: 10.3 FL (ref 8.9–12.7)
POTASSIUM SERPL-SCNC: 4.6 MMOL/L (ref 3.5–5.3)
PROTHROMBIN TIME: 12.4 SECONDS (ref 11.6–14.5)
RBC # BLD AUTO: 4.5 MILLION/UL (ref 3.88–5.62)
SODIUM SERPL-SCNC: 139 MMOL/L (ref 135–147)
TIBC SERPL-MCNC: 379 UG/DL (ref 250–450)
UIBC SERPL-MCNC: 255 UG/DL (ref 155–355)
WBC # BLD AUTO: 4.5 THOUSAND/UL (ref 4.31–10.16)

## 2024-04-30 PROCEDURE — 83550 IRON BINDING TEST: CPT

## 2024-04-30 PROCEDURE — 83540 ASSAY OF IRON: CPT

## 2024-04-30 PROCEDURE — 85610 PROTHROMBIN TIME: CPT

## 2024-04-30 PROCEDURE — 83735 ASSAY OF MAGNESIUM: CPT

## 2024-04-30 PROCEDURE — 80048 BASIC METABOLIC PNL TOTAL CA: CPT

## 2024-04-30 PROCEDURE — 83880 ASSAY OF NATRIURETIC PEPTIDE: CPT

## 2024-04-30 PROCEDURE — 85027 COMPLETE CBC AUTOMATED: CPT

## 2024-04-30 PROCEDURE — 36415 COLL VENOUS BLD VENIPUNCTURE: CPT

## 2024-04-30 PROCEDURE — 82728 ASSAY OF FERRITIN: CPT

## 2024-05-07 ENCOUNTER — APPOINTMENT (OUTPATIENT)
Dept: NON INVASIVE DIAGNOSTICS | Facility: HOSPITAL | Age: 54
End: 2024-05-07
Payer: COMMERCIAL

## 2024-05-07 ENCOUNTER — HOSPITAL ENCOUNTER (OUTPATIENT)
Facility: HOSPITAL | Age: 54
Setting detail: OUTPATIENT SURGERY
Discharge: HOME | End: 2024-05-07
Attending: INTERNAL MEDICINE | Admitting: INTERNAL MEDICINE
Payer: COMMERCIAL

## 2024-05-07 VITALS
WEIGHT: 277 LBS | SYSTOLIC BLOOD PRESSURE: 126 MMHG | HEART RATE: 74 BPM | RESPIRATION RATE: 16 BRPM | HEIGHT: 73 IN | OXYGEN SATURATION: 96 % | DIASTOLIC BLOOD PRESSURE: 68 MMHG | TEMPERATURE: 97 F | BODY MASS INDEX: 36.71 KG/M2

## 2024-05-07 DIAGNOSIS — I42.0 DILATED CARDIOMYOPATHY (HCC): ICD-10-CM

## 2024-05-07 DIAGNOSIS — I50.22 CHRONIC SYSTOLIC HEART FAILURE (HCC): ICD-10-CM

## 2024-05-07 DIAGNOSIS — I51.9 RADIATION-INDUCED HEART DISEASE: ICD-10-CM

## 2024-05-07 DIAGNOSIS — I25.10 CORONARY ARTERY DISEASE INVOLVING NATIVE HEART WITHOUT ANGINA PECTORIS, UNSPECIFIED VESSEL OR LESION TYPE: Primary | ICD-10-CM

## 2024-05-07 LAB
ANION GAP SERPL CALCULATED.3IONS-SCNC: 6 MMOL/L (ref 4–13)
AORTIC ROOT: 3.6 CM
AORTIC VALVE MEAN VELOCITY: 15.7 M/S
APICAL FOUR CHAMBER EJECTION FRACTION: 59 %
ASCENDING AORTA: 2.8 CM
AV AREA BY CONTINUOUS VTI: 1.3 CM2
AV AREA PEAK VELOCITY: 1.1 CM2
AV LVOT MEAN GRADIENT: 1 MMHG
AV LVOT PEAK GRADIENT: 2 MMHG
AV MEAN GRADIENT: 11 MMHG
AV PEAK GRADIENT: 20 MMHG
AV VALVE AREA: 1.27 CM2
AV VELOCITY RATIO: 0.33
BSA FOR ECHO PROCEDURE: 2.47 M2
BUN SERPL-MCNC: 17 MG/DL (ref 5–25)
CALCIUM SERPL-MCNC: 9.6 MG/DL (ref 8.4–10.2)
CHLORIDE SERPL-SCNC: 103 MMOL/L (ref 96–108)
CO2 SERPL-SCNC: 30 MMOL/L (ref 21–32)
CREAT SERPL-MCNC: 1.06 MG/DL (ref 0.6–1.3)
DOP CALC AO PEAK VEL: 2.21 M/S
DOP CALC AO VTI: 41.66 CM
DOP CALC LVOT AREA: 3.46 CM2
DOP CALC LVOT CARDIAC INDEX: 1.57 L/MIN/M2
DOP CALC LVOT CARDIAC OUTPUT: 3.87 L/MIN
DOP CALC LVOT DIAMETER: 2.1 CM
DOP CALC LVOT PEAK VEL VTI: 15.23 CM
DOP CALC LVOT PEAK VEL: 0.72 M/S
DOP CALC LVOT STROKE INDEX: 20.6 ML/M2
DOP CALC LVOT STROKE VOLUME: 52.72
E WAVE DECELERATION TIME: 162 MS
E/A RATIO: 0.72
FRACTIONAL SHORTENING: 24 (ref 28–44)
GFR SERPL CREATININE-BSD FRML MDRD: 79 ML/MIN/1.73SQ M
GLUCOSE P FAST SERPL-MCNC: 126 MG/DL (ref 65–99)
GLUCOSE SERPL-MCNC: 126 MG/DL (ref 65–140)
INTERVENTRICULAR SEPTUM IN DIASTOLE (PARASTERNAL SHORT AXIS VIEW): 1.1 CM
INTERVENTRICULAR SEPTUM: 1.1 CM (ref 0.6–1.1)
LAAS-AP2: 18.2 CM2
LAAS-AP4: 19.8 CM2
LEFT ATRIUM AREA SYSTOLE SINGLE PLANE A4C: 18.3 CM2
LEFT ATRIUM SIZE: 3.2 CM
LEFT ATRIUM VOLUME (MOD BIPLANE): 54 ML
LEFT ATRIUM VOLUME INDEX (MOD BIPLANE): 21.9 ML/M2
LEFT INTERNAL DIMENSION IN SYSTOLE: 3.8 CM (ref 2.1–4)
LEFT VENTRICULAR INTERNAL DIMENSION IN DIASTOLE: 5 CM (ref 3.5–6)
LEFT VENTRICULAR POSTERIOR WALL IN END DIASTOLE: 1.1 CM
LEFT VENTRICULAR STROKE VOLUME: 58 ML
LVSV (TEICH): 58 ML
MV E'TISSUE VEL-SEP: 5 CM/S
MV PEAK A VEL: 1.04 M/S
MV PEAK E VEL: 75 CM/S
MV STENOSIS PRESSURE HALF TIME: 47 MS
MV VALVE AREA P 1/2 METHOD: 4.68
POTASSIUM SERPL-SCNC: 4.6 MMOL/L (ref 3.5–5.3)
RIGHT ATRIUM AREA SYSTOLE A4C: 17.4 CM2
RIGHT VENTRICLE ID DIMENSION: 4 CM
SL CV LEFT ATRIUM LENGTH A2C: 5 CM
SL CV LV EF: 40
SL CV PED ECHO LEFT VENTRICLE DIASTOLIC VOLUME (MOD BIPLANE) 2D: 120 ML
SL CV PED ECHO LEFT VENTRICLE SYSTOLIC VOLUME (MOD BIPLANE) 2D: 62 ML
SODIUM SERPL-SCNC: 139 MMOL/L (ref 135–147)
TRICUSPID ANNULAR PLANE SYSTOLIC EXCURSION: 2.6 CM

## 2024-05-07 PROCEDURE — 93306 TTE W/DOPPLER COMPLETE: CPT

## 2024-05-07 PROCEDURE — 99152 MOD SED SAME PHYS/QHP 5/>YRS: CPT | Performed by: INTERNAL MEDICINE

## 2024-05-07 PROCEDURE — 80048 BASIC METABOLIC PNL TOTAL CA: CPT | Performed by: INTERNAL MEDICINE

## 2024-05-07 PROCEDURE — C1769 GUIDE WIRE: HCPCS | Performed by: INTERNAL MEDICINE

## 2024-05-07 PROCEDURE — 93454 CORONARY ARTERY ANGIO S&I: CPT | Performed by: INTERNAL MEDICINE

## 2024-05-07 PROCEDURE — 93306 TTE W/DOPPLER COMPLETE: CPT | Performed by: INTERNAL MEDICINE

## 2024-05-07 PROCEDURE — C1894 INTRO/SHEATH, NON-LASER: HCPCS | Performed by: INTERNAL MEDICINE

## 2024-05-07 PROCEDURE — 99153 MOD SED SAME PHYS/QHP EA: CPT | Performed by: INTERNAL MEDICINE

## 2024-05-07 RX ORDER — MIDAZOLAM HYDROCHLORIDE 2 MG/2ML
INJECTION, SOLUTION INTRAMUSCULAR; INTRAVENOUS CODE/TRAUMA/SEDATION MEDICATION
Status: DISCONTINUED | OUTPATIENT
Start: 2024-05-07 | End: 2024-05-07 | Stop reason: HOSPADM

## 2024-05-07 RX ORDER — SODIUM CHLORIDE 9 MG/ML
75 INJECTION, SOLUTION INTRAVENOUS CONTINUOUS
Status: DISCONTINUED | OUTPATIENT
Start: 2024-05-07 | End: 2024-05-07 | Stop reason: HOSPADM

## 2024-05-07 RX ORDER — HEPARIN SODIUM 1000 [USP'U]/ML
INJECTION, SOLUTION INTRAVENOUS; SUBCUTANEOUS CODE/TRAUMA/SEDATION MEDICATION
Status: DISCONTINUED | OUTPATIENT
Start: 2024-05-07 | End: 2024-05-07 | Stop reason: HOSPADM

## 2024-05-07 RX ORDER — FENTANYL CITRATE 50 UG/ML
INJECTION, SOLUTION INTRAMUSCULAR; INTRAVENOUS CODE/TRAUMA/SEDATION MEDICATION
Status: DISCONTINUED | OUTPATIENT
Start: 2024-05-07 | End: 2024-05-07 | Stop reason: HOSPADM

## 2024-05-07 RX ORDER — SODIUM CHLORIDE 9 MG/ML
75 INJECTION, SOLUTION INTRAVENOUS CONTINUOUS
Status: DISCONTINUED | OUTPATIENT
Start: 2024-05-07 | End: 2024-05-07

## 2024-05-07 RX ORDER — ASPIRIN 81 MG/1
81 TABLET, CHEWABLE ORAL DAILY
Qty: 30 TABLET | Refills: 5 | Status: SHIPPED | OUTPATIENT
Start: 2024-05-07

## 2024-05-07 RX ORDER — NITROGLYCERIN 0.4 MG/1
0.4 TABLET SUBLINGUAL
Qty: 25 TABLET | Refills: 5 | Status: SHIPPED | OUTPATIENT
Start: 2024-05-07

## 2024-05-07 RX ORDER — LIDOCAINE WITH 8.4% SOD BICARB 0.9%(10ML)
SYRINGE (ML) INJECTION CODE/TRAUMA/SEDATION MEDICATION
Status: DISCONTINUED | OUTPATIENT
Start: 2024-05-07 | End: 2024-05-07 | Stop reason: HOSPADM

## 2024-05-07 RX ORDER — VERAPAMIL HCL 2.5 MG/ML
AMPUL (ML) INTRAVENOUS CODE/TRAUMA/SEDATION MEDICATION
Status: DISCONTINUED | OUTPATIENT
Start: 2024-05-07 | End: 2024-05-07 | Stop reason: HOSPADM

## 2024-05-07 RX ADMIN — PERFLUTREN 0.8 ML/MIN: 6.52 INJECTION, SUSPENSION INTRAVENOUS at 12:24

## 2024-05-07 NOTE — RESULT ENCOUNTER NOTE
[facility] - 05/07/24   ·  Ost LM lesion is 60% stenosed.  ·  Mid-distal LM lesion is 70% stenosed.  ·  Ost LAD lesion is 80% stenosed.  ·  Ost Cx to Prox Cx lesion is 85% stenosed.  ·  Mid Cx lesion is 60% stenosed.  ·  Dist RCA lesion is 70% stenosed.    Plan:     ASA, statin  He was referred to cardiac surgery to discuss CABG

## 2024-05-07 NOTE — QUICK NOTE
Patient seen and examined.     Case discussed and imaging reviewed with cardiothoracic surgery, Dr Alex, at [place]. Patient will be scheduled for an appointment with CT surgery on Monday. CT surgery requested a new echocardiogram prior to appointment which will be done today ( he had mild to moderate AI in the past)    Findings and management was discussed with patient and family in detail  Discussed CABG versus high risk PCI. They will think about it and want to talk to surgeon as well.   Will start ASA. Cont metoprolol.       He denies chest pain, pressure or shortness of breath  Recommend avoiding heavy exertion. Recommend calling 911 or coming to ER if he has any symptoms. Sign and symptoms of heart disease to look out for were discussed in detail.  Will prescribe sublingual NTG    Patient was comfortable and all questions answered.

## 2024-05-07 NOTE — INTERVAL H&P NOTE
H&P reviewed. After examining the patient I find no changes in the patients condition since the H&P had been written.    Vitals:    05/07/24 0747   BP: 154/90   Pulse: 76   Resp: 16   Temp: (!) 97 °F (36.1 °C)   SpO2: 99%

## 2024-05-07 NOTE — DISCHARGE INSTR - AVS FIRST PAGE
1. Please see the post cardiac catheterization dishcarge instructions.   No heavy lifting, greater than 10 lbs. or strenuous  activity for 48 hrs.    2.Remove band aid tomorrow.  Shower and wash area- wrist gently with soap and water- beginning tomorrow. Rinse and pat dry.  Apply new water seal band aid.  Repeat this process for 5 days. No powders, creams lotions or antibiotic ointments  for 5 days.  No tub baths, hot tubs or swimming for 5 days.     3. Please call our office ([phone]) if you have any fever, redness, swelling, discharge from your wrist access site.    4.No driving for 2 days

## 2024-05-13 ENCOUNTER — CONSULT (OUTPATIENT)
Dept: CARDIAC SURGERY | Facility: CLINIC | Age: 54
End: 2024-05-13
Payer: COMMERCIAL

## 2024-05-13 VITALS
WEIGHT: 277 LBS | BODY MASS INDEX: 36.71 KG/M2 | HEART RATE: 82 BPM | OXYGEN SATURATION: 99 % | HEIGHT: 73 IN | DIASTOLIC BLOOD PRESSURE: 72 MMHG | SYSTOLIC BLOOD PRESSURE: 134 MMHG | TEMPERATURE: 96.4 F

## 2024-05-13 DIAGNOSIS — I31.39 PERICARDIAL EFFUSION: ICD-10-CM

## 2024-05-13 DIAGNOSIS — I10 PRIMARY HYPERTENSION: ICD-10-CM

## 2024-05-13 DIAGNOSIS — I50.22 CHRONIC SYSTOLIC HEART FAILURE (HCC): ICD-10-CM

## 2024-05-13 DIAGNOSIS — I51.9 RADIATION-INDUCED HEART DISEASE: ICD-10-CM

## 2024-05-13 DIAGNOSIS — Z13.6 ENCOUNTER FOR SCREENING FOR STENOSIS OF CAROTID ARTERY: ICD-10-CM

## 2024-05-13 DIAGNOSIS — I25.10 LEFT MAIN CORONARY ARTERY DISEASE: ICD-10-CM

## 2024-05-13 DIAGNOSIS — I45.10 RBBB: ICD-10-CM

## 2024-05-13 DIAGNOSIS — I42.9 CARDIOMYOPATHY, UNSPECIFIED TYPE (HCC): ICD-10-CM

## 2024-05-13 DIAGNOSIS — Z01.89 ENCOUNTER FOR IMAGING OF BILATERAL GREATER SAPHENOUS VEINS: ICD-10-CM

## 2024-05-13 DIAGNOSIS — E66.9 CLASS 2 OBESITY: ICD-10-CM

## 2024-05-13 DIAGNOSIS — E78.2 MIXED HYPERLIPIDEMIA: ICD-10-CM

## 2024-05-13 DIAGNOSIS — R06.09 DOE (DYSPNEA ON EXERTION): ICD-10-CM

## 2024-05-13 DIAGNOSIS — I25.10 CAD, MULTIPLE VESSEL: Primary | ICD-10-CM

## 2024-05-13 DIAGNOSIS — I44.0 1ST DEGREE AV BLOCK: ICD-10-CM

## 2024-05-13 PROBLEM — E66.812 CLASS 2 OBESITY: Status: ACTIVE | Noted: 2024-05-13

## 2024-05-13 PROCEDURE — 99244 OFF/OP CNSLTJ NEW/EST MOD 40: CPT | Performed by: THORACIC SURGERY (CARDIOTHORACIC VASCULAR SURGERY)

## 2024-05-13 NOTE — PROGRESS NOTES
Consultation - Cardiothoracic Surgery   Marcelina Ascencio 54 y.o. male MRN: [id number]    Physician Requesting Consult: Jeff Marcano MD    Primary Cardiologist: Keshav Butler MD    Cardio-oncology/heart failure: Mayco Nayak MD    Primary care provider: Rubén Niocle MD    Reason for Consult / Principal Problem: Coronary artery disease, CABG eval    History of Present Illness: Marcelina Ascencio is a 54 year old male with recently identified cardiomyopathy, chronic systolic heart failure, hypertension, hyperlipidemia, Hodgkin's lymphoma at age 19 treated with chemotherapy with recurrence 1 year later status post chemotherapy and chest radiation and bone marrow transplant '91 (since then considered in remission), nephrolithiasis, 1AVB and RBBB, plantar fasciitis, pre DM last A1c 6.0, class 2 obesity BMI 36.     He had transient dyspnea in 2023 which prompted an echocardiogram on  6/6/23 by his PCP.  This identified newly reduced ejection fraction of 43%, and small pericardial effusion, normal RV size/function, no significant valve disease.  He was then referred to cardiologist Dr. Butler.  His cardiomyopathy was suspected due to his prior chemo or radiation.  He was started on goal-directed medical therapy for his reduced EF with beta-blocker and ACE inhibitor, with planned  repeat echocardiogram and CT calcium score.  Repeat echo 1/2024 again showed reduced LVEF approximately 35%, mild-mod AI, moderate pericardial effusion. His goal-directed medical therapy was uptitrated with and additional meds were added/adjusted, CT calcium score was 1665, and he was referred to cardio oncology/heart failure Dr. Koenig.  His goal-directed medical therapy was again uptitrated/adjusted and also was referred for ischemic evaluation with coronary angiogram given his CT calcium score. He denied anginal symptoms, but admitted to [facility] [place].     His cardiac catheterization was then performed by Dr. Marcano on 5/7/2024 which showed severe  left main and multivessel CAD with 60% ostial LM, 70% mid to distal LM, ostial LAD 80%, ostial - proximal circumflex 85%, mid circumflex 60%, and distal RCA 70%.  Repeat echocardiogram 5/7/2024 was again performed and showed LVEF 40% with mild systolic dysfunction and mild global HK and mild diastolic dysfunction, RV size mildly dilated, mild AI/AS, and small to moderate pericardial effusion.  He was referred to cardiac surgery for CABG consideration vs high risk PCI.     Patient denies any recent exertional or rest chest pain. Denies SOB at rest. Admits to some BARRERA. No LE edema. No palpitations. Denies syncope. Denies hemoptysis, hematochezia, hematemesis, melena, hematuria.     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  Smokes cigars, rare alcohol use, no drug use.     Past Medical History:  Past Medical History:   Diagnosis Date    1st degree AV block 05/13/2024    CAD, multiple vessel 05/13/2024    Cardiomyopathy (HCC)     Chronic systolic heart failure (HCC) 04/23/2024    Class 2 obesity 05/13/2024    Hodgkin's lymphoma (HCC)     Hypercholesterolemia     Hyperlipidemia     Nephrolithiasis     Pericardial effusion 04/23/2024    Plantar fasciitis 08/28/2018    Pre-diabetes     Primary hypertension 04/23/2024    Radiation-induced heart disease 04/23/2024    RBBB 05/13/2024       Past Surgical History:   Past Surgical History:   Procedure Laterality Date    BONE MARROW TRANSPLANT  01/01/1991 1991    CARDIAC CATHETERIZATION Left 5/7/2024    Procedure: Cardiac Left Heart Cath;  Surgeon: Jeff Marcano MD;  Location: [facility];  Service: Cardiology    CARDIAC CATHETERIZATION N/A 5/7/2024    Procedure: Cardiac Coronary Angiogram;  Surgeon: Jeff Marcano MD;  Location: [facility];  Service: Cardiology    CARDIAC CATHETERIZATION  5/7/2024    Procedure: Cardiac catheterization;  Surgeon: Jeff Marcano MD;  Location: [facility];  Service:  Cardiology       Family History:  Family History   Problem Relation Age of Onset    Hypertension Mother     Heart attack Father         age 59.  was a heavy drinker.       Social History:    Social History     Substance and Sexual Activity   Alcohol Use Yes    Comment: social     Social History     Substance and Sexual Activity   Drug Use No     Social History     Tobacco Use   Smoking Status Light Smoker    Types: Cigars   Smokeless Tobacco Never   Tobacco Comments    Patient smokes cigars 3-4 times a week       Home Medications:   Prior to Admission medications    Medication Sig Start Date End Date Taking? Authorizing Provider   aspirin 81 mg chewable tablet Chew 1 tablet (81 mg total) daily 5/7/24   Nika Willis PA-C   atorvastatin (LIPITOR) 40 mg tablet Take 1 tablet (40 mg total) by mouth daily 7/5/23   Keshav Butler MD   dapagliflozin (Farxiga) 10 MG tablet Take 1 tablet (10 mg total) by mouth daily 2/6/24   Keshav Butler MD   metoprolol succinate (TOPROL-XL) 50 mg 24 hr tablet Take 1 tablet (50 mg total) by mouth 2 (two) times a day 4/23/24   Mayco Nayak MD   nitroglycerin (NITROSTAT) 0.4 mg SL tablet Place 1 tablet (0.4 mg total) under the tongue every 5 (five) minutes as needed for chest pain 5/7/24   Nika Willis PA-C   sacubitril-valsartan (Entresto) 49-51 MG TABS Take 1 tablet by mouth 2 (two) times a day 2/8/24   Keshav Butler MD       Allergies:  Allergies   Allergen Reactions    Pollen Extract Eye Swelling     Reports allergy to pollens during the summer       Review of Systems:   Review of Systems   Constitutional:  Negative for chills and fever.   HENT:  Negative for trouble swallowing.    Eyes:  Negative for visual disturbance.   Respiratory:  Negative for chest tightness and shortness of breath.    Cardiovascular:  Negative for chest pain, palpitations and leg swelling.   Gastrointestinal:  Negative for abdominal distention, abdominal pain, anal bleeding, blood in stool,  "nausea and vomiting.   Genitourinary:  Negative for hematuria.   Skin:  Negative for rash.   Neurological:  Negative for dizziness, seizures, syncope and light-headedness.   Psychiatric/Behavioral:  Negative for agitation and behavioral problems.        Vital Signs:     Vitals:    05/13/24 1129 05/13/24 1132   BP: 124/67 134/72   BP Location: Left arm Right arm   Patient Position: Sitting Sitting   Cuff Size: Large Standard   Pulse: 82    Temp: (!) 96.4 °F (35.8 °C)    TempSrc: Tympanic    SpO2: 99%    Weight: 126 kg (277 lb)    Height: 6' 1\" (1.854 m)        Physical Exam  Constitutional:       General: He is not in acute distress.     Appearance: Normal appearance. He is obese. He is not ill-appearing.   HENT:      Head: Normocephalic and atraumatic.      Nose: Nose normal.      Mouth/Throat:      Mouth: Mucous membranes are moist.      Pharynx: Oropharynx is clear.   Eyes:      Extraocular Movements: Extraocular movements intact.   Neck:      Vascular: No carotid bruit.   Cardiovascular:      Rate and Rhythm: Normal rate.      Pulses: Normal pulses.      Heart sounds: Normal heart sounds. No murmur heard.  Pulmonary:      Effort: Pulmonary effort is normal. No respiratory distress.      Breath sounds: Normal breath sounds. No wheezing or rales.   Abdominal:      General: Bowel sounds are normal. There is no distension.      Palpations: Abdomen is soft.      Tenderness: There is no abdominal tenderness.   Musculoskeletal:      Cervical back: Normal range of motion and neck supple. No tenderness.      Right lower leg: No edema.      Left lower leg: No edema.   Skin:     General: Skin is warm and dry.      Coloration: Skin is not jaundiced.   Neurological:      General: No focal deficit present.      Mental Status: He is alert and oriented to person, place, and time. Mental status is at baseline.   Psychiatric:         Mood and Affect: Mood normal.         Behavior: Behavior normal.         Lab Results:       "   Results from last 7 days   Lab Units 05/07/24  0755   POTASSIUM mmol/L 4.6   CHLORIDE mmol/L 103   CO2 mmol/L 30   BUN mg/dL 17   CREATININE mg/dL 1.06   CALCIUM mg/dL 9.6   4/30/24  BMP: Sodium 139 potassium 4.6 BUN 17 creatinine 1.06  INR 0.87        Lab Results   Component Value Date    HGBA1C 6.0 (H) 05/01/2023     Lab Results   Component Value Date    CKTOTAL 233 07/31/2018    CKMB 1.9 07/31/2018    CKMBINDEX <1.0 07/31/2018       Imaging Studies:     Cardiac Catheterization: 5/7/24    Ost LM lesion is 60% stenosed.    Mid LM to Dist LM lesion is 70% stenosed.    Ost LAD lesion is 80% stenosed.    Ost Cx to Prox Cx lesion is 85% stenosed.    Mid Cx lesion is 60% stenosed.    Dist RCA lesion is 70% stenosed.   Impression:  Triple vessel CAD involving left main bifurcating into ostial LAD and ostial left circumflex as described  Recommendation:  Recommend cardiothoracic surgery evaluation for CABG  Medical therapy for CAD including ASA  Routine post cath care  Finding of cardiac catheterization and further management was discussed in detail with patient     Echocardiogram: 5/7/24    Left Ventricle: Left ventricular cavity size is normal. Wall thickness is normal. The left ventricular ejection fraction is 40%. Systolic function is mildly reduced. There is mild global hypokinesis. Diastolic function is mildly abnormal, consistent with grade I (abnormal) relaxation.    Right Ventricle: Right ventricular cavity size is mildly dilated.    Aortic Valve: There is mild regurgitation. There is mild stenosis.    Mitral Valve: There is mild annular calcification.    Pericardium: There is a small to moderate pericardial effusion. There is no echocardiographic evidence of tamponade.    CT coronary calcium score: 7/11/23  Coronary artery calcium score breakdown is as follows:  Left main coronary artery:  169  Left anterior descending coronary artery and diagonal branches:  706  Left circumflex coronary artery and left  marginal branches:  294  Right coronary artery and right marginal branches:  496  Posterior descending coronary artery: 0  TOTAL coronary calcium score:  1665  Calcium score PERCENTILE of age, race, and gender matched database participants in the Multi-Ethnic Study of Atherosclerosis (CARDOSO) trial:   99th percentile  HEART/GREAT VESSELS: Small pericardial effusion. Mild calcification of aortic valve suggesting possible element of aortic stenosis. No thoracic aortic aneurysm.  EXTRACARDIAC FINDINGS: No significant findings identified on limited small field of view low radiation dose noncontrast images of the chest at the level of the heart.  IMPRESSION:  Total coronary calcium score equals 1665. For more useful information regarding the prognostic significance of the calcium score, please consult the calculator at the website http://[email or web address].    EK24  Sinus rhythm with first-degree AV block   Right bundle branch block     I have personally reviewed pertinent reports.   and I have personally reviewed pertinent films in PACS    Assessment:  Patient Active Problem List    Diagnosis Date Noted    RBBB 2024    CAD, multiple vessel 2024    Class 2 obesity 2024    1st degree AV block 2024    Left main coronary artery disease 2024    Chronic systolic heart failure (HCC) 2024    Pericardial effusion 2024    Radiation-induced heart disease 2024    Primary hypertension 2024    Mixed hyperlipidemia 2024    Cardiomyopathy (HCC) 2023    Family history of ischemic bowel disease 2023    Plantar fasciitis 2018    Mononeuropathy 2016    Hypercholesterolemia 2014     Impression:  - severe LM/MVCAD  - mixed ischemic and radiation/chemo-induced cardiomyopathy  - mild LV dysfunction LVEF 40%  - chronic systolic CHF  - history of Hodgkin's lymphoma status post chemo and chest radiation and bone marrow biopsy at age  19  - mild AI  - small-moderate pericardial effusion  - 1st AVB/RBBB  - hypertension, hyperlipidemia  - pre DM, A1c 6.0    Plan:  Surgery, surgical recovery, and risks and benefits of coronary artery bypass grafting were discussed in detail today with the patient.  They understand and wish to proceed with further workup and ultimately surgical intervention.      We have ordered routine preoperative laboratory and vascular studies including vein mapping, radial/palmar arch studies and carotid ultrasound    After the tests are completed, he will return to the office to review results and further discuss/schedule surgery with Dr. Kesahv Alex D.O.    Marcelina Lemuralidain was comfortable with our recommendations, and their questions were answered to their satisfaction.  Thank you for allowing us to participate in the care of this patient.     The patient recently had a screening colonoscopy in 5/2023.  Therefore GI referral is not indicated at this time.     SIGNATURE: Herberth George PA-C  DATE: 05/13/24  TIME: 11:33 AM    * This note was completed in part utilizing [employer] direct voice recognition software.   Grammatical errors, random word insertion, spelling mistakes, and incomplete sentences may be an occasional consequence of the system secondary to software limitations, ambient noise and hardware issues. At the time of dictation, efforts were made to edit, clarify and /or correct errors. Please read the chart carefully and recognize, using context, where substitutions have occurred.  If you have any questions or concerns about the context, text or information contained within the body of this dictation, please contact myself, the provider, for further clarification.

## 2024-05-14 ENCOUNTER — HOSPITAL ENCOUNTER (OUTPATIENT)
Dept: NON INVASIVE DIAGNOSTICS | Facility: CLINIC | Age: 54
Discharge: HOME | End: 2024-05-14
Payer: COMMERCIAL

## 2024-05-14 DIAGNOSIS — Z13.6 ENCOUNTER FOR SCREENING FOR STENOSIS OF CAROTID ARTERY: ICD-10-CM

## 2024-05-14 DIAGNOSIS — I25.10 CAD, MULTIPLE VESSEL: ICD-10-CM

## 2024-05-14 DIAGNOSIS — Z01.89 ENCOUNTER FOR IMAGING OF BILATERAL GREATER SAPHENOUS VEINS: ICD-10-CM

## 2024-05-14 DIAGNOSIS — I51.9 RADIATION-INDUCED HEART DISEASE: ICD-10-CM

## 2024-05-14 DIAGNOSIS — I42.9 CARDIOMYOPATHY, UNSPECIFIED TYPE (HCC): ICD-10-CM

## 2024-05-14 PROCEDURE — 93880 EXTRACRANIAL BILAT STUDY: CPT | Performed by: SURGERY

## 2024-05-14 PROCEDURE — 93971 EXTREMITY STUDY: CPT

## 2024-05-14 PROCEDURE — 93880 EXTRACRANIAL BILAT STUDY: CPT

## 2024-05-14 PROCEDURE — 93971 EXTREMITY STUDY: CPT | Performed by: SURGERY

## 2024-05-14 PROCEDURE — 93923 UPR/LXTR ART STDY 3+ LVLS: CPT | Performed by: SURGERY

## 2024-05-28 ENCOUNTER — OFFICE VISIT (OUTPATIENT)
Dept: CARDIAC SURGERY | Facility: CLINIC | Age: 54
End: 2024-05-28
Payer: COMMERCIAL

## 2024-05-28 VITALS
HEART RATE: 93 BPM | HEIGHT: 73 IN | WEIGHT: 275.3 LBS | SYSTOLIC BLOOD PRESSURE: 102 MMHG | OXYGEN SATURATION: 97 % | BODY MASS INDEX: 36.49 KG/M2 | DIASTOLIC BLOOD PRESSURE: 54 MMHG

## 2024-05-28 DIAGNOSIS — I51.9 RADIATION-INDUCED HEART DISEASE: ICD-10-CM

## 2024-05-28 DIAGNOSIS — I42.9 CARDIOMYOPATHY, UNSPECIFIED TYPE (HCC): Primary | ICD-10-CM

## 2024-05-28 DIAGNOSIS — I25.10 CAD, MULTIPLE VESSEL: ICD-10-CM

## 2024-05-28 PROCEDURE — 99214 OFFICE O/P EST MOD 30 MIN: CPT | Performed by: THORACIC SURGERY (CARDIOTHORACIC VASCULAR SURGERY)

## 2024-05-28 NOTE — PROGRESS NOTES
Consultation - Cardiothoracic Surgery   Marcelina Ascencio 54 y.o. male MRN: [id number]    Physician Requesting Consult: Dr. Marcano    Primary Cardiologist: Dr. Butler    Cardio-oncology/heart failure: Dr. Nayak    Reason for Consult / Principal Problem: Coronary artery disease, CABG evaluation     History of Present Illness: Marcelina Ascencio is a 54 y.o. year old male who returns today after preoperative testing - palmar arch study, vein mapping, carotid artery ultrasound - to discuss test results and surgical plan.     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      He had transient dyspnea in 2023 which prompted an echocardiogram on  6/6/23 by his PCP.  This identified newly reduced ejection fraction of 43%, and small pericardial effusion, normal RV size/function, no significant valve disease.  He was then referred to cardiologist Dr. Butler.  His cardiomyopathy was suspected due to his prior chemo or radiation.  He was started on goal-directed medical therapy for his reduced EF with beta-blocker and ACE inhibitor, with planned  repeat echocardiogram and CT calcium score.  Repeat echo 1/2024 again showed reduced LVEF approximately 35%, mild-mod AI, moderate pericardial effusion. His goal-directed medical therapy was uptitrated with and additional meds were added/adjusted, CT calcium score was 1665, and he was referred to cardio oncology/heart failure Dr. Koenig.  His goal-directed medical therapy was again uptitrated/adjusted and also was referred for ischemic evaluation with coronary angiogram given his CT calcium score. He denied anginal symptoms, but  admitted to [facility].      His cardiac catheterization was then performed by Dr. Marcano on 5/7/2024 which showed severe left main and multivessel CAD with 60% ostial LM, 70% mid to distal LM, ostial LAD 80%, ostial - proximal circumflex 85%, mid circumflex 60%, and distal RCA 70%.  Repeat echocardiogram 5/7/2024 was again performed and showed LVEF 40% with mild systolic dysfunction and mild global HK and mild diastolic dysfunction, RV size mildly dilated, mild AI/AS, and small to moderate pericardial effusion.  He was referred to cardiac surgery for CABG consideration, and was noted to be a good candidate for bypass.      Since his last visit, patient denies any recent exertional or rest chest pain. Denies SOB at rest. Admits to some [facility]A. No LE edema. No palpitations. Denies syncope. Denies hemoptysis, hematochezia, hematemesis, melena, hematuria. He does report that about 3 days ago, he noticed a tender area just below his left knee, warm to touch, swollen, and slightly red. It improved with ice. No new shortness of breath, no fevers.      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 Smokes cigars, rare alcohol use, no drug use.     Past Medical History:  Past Medical History:   Diagnosis Date    1st degree AV block 05/13/2024    CAD, multiple vessel 05/13/2024    Cardiomyopathy (HCC)     Chronic systolic heart failure (HCC) 04/23/2024    Class 2 obesity 05/13/2024    Hodgkin's lymphoma (HCC)     Hypercholesterolemia     Hyperlipidemia     Nephrolithiasis     Pericardial effusion 04/23/2024    Plantar fasciitis 08/28/2018    Pre-diabetes     Primary hypertension 04/23/2024    Radiation-induced heart disease 04/23/2024    RBBB 05/13/2024         Past Surgical History:   Past Surgical History:   Procedure Laterality Date    BONE MARROW TRANSPLANT  01/01/1991 1991    CARDIAC CATHETERIZATION Left 5/7/2024    Procedure: Cardiac Left Heart Cath;  Surgeon: Jeff Marcano MD;  Location:  [facility];  Service: Cardiology    CARDIAC CATHETERIZATION N/A 5/7/2024    Procedure: Cardiac Coronary Angiogram;  Surgeon: Jeff Marcano MD;  Location: [facility];  Service: Cardiology    CARDIAC CATHETERIZATION  5/7/2024    Procedure: Cardiac catheterization;  Surgeon: Jeff Marcano MD;  Location: [facility];  Service: Cardiology         Family History:  Family History   Problem Relation Age of Onset    Hypertension Mother     Heart attack Father         age 59.  was a heavy drinker.         Social History:      Social History     Substance and Sexual Activity   Alcohol Use Yes    Comment: social     Social History     Substance and Sexual Activity   Drug Use No     Social History     Tobacco Use   Smoking Status Light Smoker    Types: Cigars   Smokeless Tobacco Never   Tobacco Comments    Patient smokes cigars 3-4 times a week       Home Medications:   Prior to Admission medications    Medication Sig Start Date End Date Taking? Authorizing Provider   aspirin 81 mg chewable tablet Chew 1 tablet (81 mg total) daily 5/7/24  Yes Nika Willis PA-C   atorvastatin (LIPITOR) 40 mg tablet Take 1 tablet (40 mg total) by mouth daily 7/5/23  Yes Keshav Butler MD   dapagliflozin (Farxiga) 10 MG tablet Take 1 tablet (10 mg total) by mouth daily 2/6/24  Yes Keshav Butler MD   metoprolol succinate (TOPROL-XL) 50 mg 24 hr tablet Take 1 tablet (50 mg total) by mouth 2 (two) times a day 4/23/24  Yes Mayco Nayak MD   nitroglycerin (NITROSTAT) 0.4 mg SL tablet Place 1 tablet (0.4 mg total) under the tongue every 5 (five) minutes as needed for chest pain  Patient taking differently: Place 0.4 mg under the tongue every 5 (five) minutes as needed for chest pain As needed 5/7/24  Yes Nika Willis PA-C   sacubitril-valsartan (Entresto) 49-51 MG TABS Take 1 tablet by mouth 2 (two) times a day 2/8/24  Yes Keshav Butler MD       Allergies:  Allergies   Allergen Reactions    Pollen  "Extract Eye Swelling     Reports allergy to pollens during the summer       Review of Systems:     Review of Systems   Constitutional:  Negative for chills and fever.   HENT:  Negative for ear pain and sore throat.    Eyes:  Negative for pain and visual disturbance.   Respiratory:  Negative for cough and shortness of breath.    Cardiovascular:  Negative for chest pain and palpitations.   Gastrointestinal:  Negative for abdominal pain and vomiting.   Genitourinary:  Negative for dysuria and hematuria.   Musculoskeletal:  Negative for arthralgias and back pain.   Skin:  Negative for color change and rash.   Neurological:  Negative for seizures and syncope.   All other systems reviewed and are negative.      Vital Signs:     Vitals:    05/28/24 1506 05/28/24 1508   BP: 106/58 102/54   BP Location: Left arm Right arm   Patient Position: Sitting Sitting   Cuff Size: Standard Large   Pulse: 93    SpO2: 97%    Weight: 125 kg (275 lb 4.8 oz)    Height: 6' 1\" (1.854 m)        Physical Exam:     Physical Exam  Vitals reviewed.   Constitutional:       Appearance: Normal appearance. He is obese.   HENT:      Head: Normocephalic and atraumatic.   Eyes:      Pupils: Pupils are equal, round, and reactive to light.   Neck:      Vascular: No carotid bruit or JVD.   Cardiovascular:      Rate and Rhythm: Normal rate and regular rhythm.      Pulses:           Carotid pulses are 2+ on the right side and 2+ on the left side.       Radial pulses are 2+ on the right side and 2+ on the left side.        Dorsalis pedis pulses are 2+ on the right side and 2+ on the left side.      Heart sounds: Normal heart sounds. No murmur heard.     No friction rub. No gallop.   Pulmonary:      Effort: Pulmonary effort is normal.      Breath sounds: Normal breath sounds. No wheezing, rhonchi or rales.   Abdominal:      Palpations: Abdomen is soft.      Tenderness: There is no abdominal tenderness.   Musculoskeletal:      Cervical back: Normal range of " "motion.      Left knee: Tenderness present.      Comments: Slightly warm and erythematous    Skin:     General: Skin is warm and dry.      Capillary Refill: Capillary refill takes less than 2 seconds.   Neurological:      General: No focal deficit present.      Mental Status: He is alert.      Sensory: Sensation is intact.   Psychiatric:         Attention and Perception: Attention normal.         Mood and Affect: Mood normal.         Behavior: Behavior normal. Behavior is cooperative.         Lab Results:               Invalid input(s): \"LABGLOM\"      Lab Results   Component Value Date    HGBA1C 6.0 (H) 05/01/2023     Lab Results   Component Value Date    CKTOTAL 233 07/31/2018    CKMB 1.9 07/31/2018    CKMBINDEX <1.0 07/31/2018       Imaging Studies:     Cardiac Catheterization: 5/7/24    Ost LM lesion is 60% stenosed.    Mid LM to Dist LM lesion is 70% stenosed.    Ost LAD lesion is 80% stenosed.    Ost Cx to Prox Cx lesion is 85% stenosed.    Mid Cx lesion is 60% stenosed.    Dist RCA lesion is 70% stenosed.     Triple vessel CAD involving left main bifurcating into ostial LAD and ostial left circumflex as described       Echocardiogram: 5/7/24  Left Ventricle Left ventricular cavity size is normal. Wall thickness is normal. The left ventricular ejection fraction is 40%. Systolic function is mildly reduced. There is mild global hypokinesis. Diastolic function is mildly abnormal, consistent with grade I (abnormal) relaxation.   Right Ventricle Right ventricular cavity size is mildly dilated. Systolic function is normal. Wall thickness is normal.   Left Atrium The atrium is normal in size.   Right Atrium The atrium is normal in size.   Aortic Valve The aortic valve was not well visualized. The leaflets are mildly calcified. There is mild regurgitation. There is mild stenosis.   Mitral Valve There is mild annular calcification.  There is no evidence of regurgitation. There is no evidence of stenosis. The valve has " normal function.   Tricuspid Valve Tricuspid valve structure is normal. There is no evidence of regurgitation. There is no evidence of stenosis.   Pulmonic Valve Pulmonic valve structure is normal. There is no evidence of regurgitation. There is no evidence of stenosis.   Ascending Aorta The aortic root is normal in size.   Pericardium There is a small to  moderate pericardial effusion. There is no echocardiographic evidence of tamponade.     Vein Mappin24  RIGHT LOWER LIMB:  The great saphenous vein is patent from the groin to the ankle.  The intraluminal diameter measurements range from 2.6 mm to 7.0 mm throughout.     LEFT LOWER LIMB:  The great saphenous vein is patent from the groin to the ankle.  The intraluminal diameter measurements range from 2.2 mm to 7.5 mm throughout.    Carotid Artery Ultrasound: 24  FINDINGS:     Right        Impression  PSV  EDV (cm/s)  Direction of Flow  Ratio    Dist. ICA                 71          29                      0.78    Mid. ICA                  86          34                      0.95    Prox. ICA    1 - 49%      82          27                      0.90    Dist CCA                  91          18                              Mid CCA                   91          20                      0.85    Prox CCA                 108          14                      0.92    Ext Carotid              142          13                      1.56    Prox Vert                 67          21  Antegrade                   Subclavian               146           0                              Innominate               117          11                                 Left         Impression  PSV  EDV (cm/s)  Direction of Flow  Ratio    Dist. ICA                 57          23                      0.61    Mid. ICA                  77          32                      0.81    Prox. ICA    1 - 49%      68          28                      0.72    Dist CCA                  52          15                               Mid CCA                   95          22                      0.62    Prox CCA                 153          39                              Ext Carotid              129          17                      1.37    Prox Vert                 66          20  Antegrade                   Subclavian               132           2                                Palmar Arch Study: 5/14/24  Impression  RIGHT UPPER LIMB:  Waveform on digits obliterate with compression of Radial Artery  Waveform on digits obliterate with compression of Ulnar Artery  DUPLEX evaluation: The radial artery displays a high bifurcation from the  brachial artery in the proximal upper arm the intraluminal diameters and depths  are as follow without evidence of arterial occlusive disease.  Proximal (Upper arm): Diameter: 3.6 mm and Depth: 8.9 mm.  Mid (Antecubital fossa): Diameter: 3.2 mm and Depth: 8.3 mm.  Distal: Diameter: 2.8 mm and Depth: 8.0 mm.     LEFT UPPER LIMB:  Waveform on digits obliterate with compression of Radial Artery  Waveform on digits obliterate with compression of Ulnar Artery  DUPLEX evaluation: The radial artery bifurcates from the brachial artery at the  antecubital fossa with the intraluminal diameters and depths as follow without  evidence of arterial occlusive disease.  Proximal: Diameter: 3.1 mm and Depth: 9.7 mm.  Mid: Diameter: 2.6 mm and Depth: 13.7 mm.  Distal: Diameter: 3.4 mm and Depth: 5.0 mm.     CONCLUSION:  Evaluation shows a incomplete palmar arch bilaterally when interrogated with  alternate compression and release of the radial and ulnar arteries.      I have personally reviewed pertinent reports.      Assessment:  Patient Active Problem List    Diagnosis Date Noted    RBBB 05/13/2024    CAD, multiple vessel 05/13/2024    Class 2 obesity 05/13/2024    1st degree AV block 05/13/2024    Left main coronary artery disease 05/13/2024    Chronic systolic heart failure (HCC) 04/23/2024     Pericardial effusion 04/23/2024    Radiation-induced heart disease 04/23/2024    Primary hypertension 04/23/2024    Mixed hyperlipidemia 04/23/2024    Cardiomyopathy (HCC) 07/05/2023    Family history of ischemic bowel disease 07/05/2023    Plantar fasciitis 08/28/2018    Mononeuropathy 11/04/2016    Hypercholesterolemia 04/09/2014     Severe coronary artery disease; Ongoing CABG workup    Plan:  Patient has a second opinion at [facility] next Monday. He will go to this appointment, and then call our office if he decides to proceed with surgery with Dr. Alex.     Marcelina Ascencio was comfortable with our recommendations, and their questions were answered to their satisfaction.  Thank you for allowing us to participate in the care of this patient.     The patient recently had a screening colonoscopy in May 2023.  Therefore GI referral is not indicated at this time.     SIGNATURE: Rachel Fierro PA-C  DATE: 05/28/24  TIME: 3:12 PM

## 2024-06-04 DIAGNOSIS — I42.0 DILATED CARDIOMYOPATHY (HCC): ICD-10-CM

## 2024-06-04 RX ORDER — SACUBITRIL AND VALSARTAN 49; 51 MG/1; MG/1
1 TABLET, FILM COATED ORAL 2 TIMES DAILY
Qty: 180 TABLET | Refills: 0 | Status: SHIPPED | OUTPATIENT
Start: 2024-06-04

## 2024-06-04 NOTE — TELEPHONE ENCOUNTER
Reason for call:   [x] Refill   [] Prior Auth  [] Other:     Office:   [] PCP/Provider -   [x] Specialty/Provider -Keshav Butler/[facility]              Medication: Entresto    Dose/Frequency: 49-51 mg tabs    Quantity: #180    Pharmacy: [place] [id number] [address]    Does the patient have enough for 3 days?   [] Yes   [x] No - Send as HP to POD

## 2024-06-11 ENCOUNTER — OFFICE VISIT (OUTPATIENT)
Dept: CARDIOLOGY CLINIC | Facility: CLINIC | Age: 54
End: 2024-06-11
Payer: COMMERCIAL

## 2024-06-11 VITALS
DIASTOLIC BLOOD PRESSURE: 74 MMHG | HEIGHT: 73 IN | OXYGEN SATURATION: 98 % | BODY MASS INDEX: 37.11 KG/M2 | HEART RATE: 86 BPM | WEIGHT: 280 LBS | SYSTOLIC BLOOD PRESSURE: 136 MMHG

## 2024-06-11 DIAGNOSIS — I10 PRIMARY HYPERTENSION: ICD-10-CM

## 2024-06-11 DIAGNOSIS — I31.39 PERICARDIAL EFFUSION: ICD-10-CM

## 2024-06-11 DIAGNOSIS — I50.22 CHRONIC SYSTOLIC HEART FAILURE (HCC): ICD-10-CM

## 2024-06-11 DIAGNOSIS — I25.5 ISCHEMIC CARDIOMYOPATHY: Primary | ICD-10-CM

## 2024-06-11 DIAGNOSIS — E78.2 MIXED HYPERLIPIDEMIA: ICD-10-CM

## 2024-06-11 DIAGNOSIS — C81.90 HODGKIN LYMPHOMA, UNSPECIFIED HODGKIN LYMPHOMA TYPE, UNSPECIFIED BODY REGION (HCC): ICD-10-CM

## 2024-06-11 DIAGNOSIS — I51.9 RADIATION-INDUCED HEART DISEASE: ICD-10-CM

## 2024-06-11 DIAGNOSIS — I25.10 CORONARY ARTERY DISEASE INVOLVING NATIVE CORONARY ARTERY OF NATIVE HEART WITHOUT ANGINA PECTORIS: ICD-10-CM

## 2024-06-11 PROCEDURE — 99215 OFFICE O/P EST HI 40 MIN: CPT | Performed by: INTERNAL MEDICINE

## 2024-06-11 RX ORDER — METOPROLOL SUCCINATE 50 MG/1
75 TABLET, EXTENDED RELEASE ORAL 2 TIMES DAILY
Qty: 90 TABLET | Refills: 5 | Status: SHIPPED | OUTPATIENT
Start: 2024-06-11

## 2024-06-11 RX ORDER — ATORVASTATIN CALCIUM 80 MG/1
80 TABLET, FILM COATED ORAL DAILY
Qty: 30 TABLET | Refills: 11 | Status: SHIPPED | OUTPATIENT
Start: 2024-06-11

## 2024-06-11 NOTE — PROGRESS NOTES
Cardio-Oncology / Heart Failure Cardiology Clinic Note    Marcelina Ascencio 54 y.o. male   MRN: [id number]  Encounter: [id number]        Assessment / Plan:    # Cardio-Oncology Pertinent History  Hodgkin's lymphoma  Dx age 19  Initial treatment included - ABVD.     Relapse 1 year later, had MOPP and chest radiation.  Hx of bone marrow transplant 1991    # ischemic cardiomyopathy  # HFrEF - chronic    ETIOLOGY:  - new dx 6/2023.  EF 40%.   - [facility] 5-2024 - multivessel CAD  - suspect 2/2 CAD +/- prior anthracycline therapy    VOLUME:  - not requiring diuretic  - Exam - euvolemic  - baseline BNP - 73    GDMT:  - toprol 50mg BID --> increase 75 BID  - entresto 49-51 BID  - spironlactone caused hyperkalemia and was DC'd  - dapagliflozin 10mg daily    DEVICE:  - has RBBB  - optimize GDMT, revascularization, and then repeat echo to re-eval EF    # CAD  New dx on [facility] 5-7-24  ASA, statin  Plan for CABG in August  Discussed ER if any chest pain  Can order cardiac rehab after surgery    # small-moderate pericardial effusion  Seen on 2023 and 2024 echo.  ? Due to prior radiation  No evidence of tamponade  Will need serial f/u of this going forward    # Radiation heart disease  Echo shows significant MAC.  The aortic valve is calcified and there is mild to moderate AI and mild AS.  Will need serial follow-up of valve disease as it is likely to progress    # HTN  See GDMT above.      # HLD  Lipitor 40mg daily  Last LDL 91  ---> increase lipitor to 80mg.  If he has side effects with this dose we would drop the dose back down and start Zetia.        Today's Plan Summary:  See above assessment/plan for full details of today's plan.  Briefly,     Increase toprol to 75 twice daily  Increase Lipitor to 80                  Reason For Visit / Chief Complaint:  F/u -  CAD, cardiomyopathy, and history of cancer therapies for lymphoma    HPI:   Marcelina Ascencio is a 54 y.o.  male with history as noted in the problem list and further detailed in the above  assessment and plan.    Initial:   April 2024  Referred by Dr. Butler (general cardiology) for a new patient visit for dilated cardiomyopathy and history of cancer therapies for lymphoma  As above, the patient has a distant history of lymphoma.  Treatment included anthracycline based chemotherapy and radiation.  He also had a bone marrow transplant.  In June 2023 he had some shortness of breath for which an echo was obtained and demonstrating an EF of 43%.  This was a new diagnosis.  He was referred to cardiology and started on beta-blocker/ACE.  A repeat echo in January 2024 reported an EF down to 35% (although the biplane measurement was 43%).  Given his history of anthracycline and radiation he was referred to cardio-oncology/heart failure clinic.  Today, the patient reports -  no chest pain.  Does get BARRERA.   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   .  Had a daughter.  Smokes cigars.      Interval:  Plan at initial visit -->   coronary angiogram  Check iron, magnesium, BNP  Uptitrate toprol    Labs - 4-30-24  Iron studies -  normal  BMP- stable  Mag - normal  BNP -  73    [facility] - 05/07/24   ·  Ost LM lesion is 60% stenosed.  ·  Mid-distal LM lesion is 70% stenosed.  ·  Ost LAD lesion is 80% stenosed.  ·  Ost Cx to Prox Cx lesion is 85% stenosed.  ·  Mid Cx lesion is 60% stenosed.  ·  Dist RCA lesion is 70% stenosed.  Plan:     ASA, statin  He was referred to cardiac surgery to discuss CABG    Repeat echo - EF 40%.    Saw CT surgery and CABG recommended.  Patient decided to get a second opinion at [facility].  CABG tentatively scheduled for August.    Today - feeling stable.  No angina.  Some BARRERA.    No leg edema.   No syncope.             Cardiac Imaging personally reviewed:  EKG 7-5-23  Sinus rhythm with first-degree AV block  Right bundle branch block    4-23-24  Sinus rhythm with first-degree AV block  Right bundle branch block       Holter or event monitor    Echo 6-6-23  EF 43%  Small  pericardial effusion    1-16-24  LVIDd 5.5cm.   EF 35% per report (43% by biplane)  Normal RV size and function  Mild AS.  Mild-mod AI  Small-moderate pericardial effusion, no tamponade    5-7-24  EF 40%.  Mild AS.  Mild-mod AI  mall-moderate pericardial effusion, no tamponade       SOLANGE    Cardiac MRI    Stress testing    Coronary CTA or [facility] - 05/07/24   ·  Ost LM lesion is 60% stenosed.  ·  Mid-distal LM lesion is 70% stenosed.  ·  Ost LAD lesion is 80% stenosed.  ·  Ost Cx to Prox Cx lesion is 85% stenosed.  ·  Mid Cx lesion is 60% stenosed.  ·  Dist RCA lesion is 70% stenosed.       [facility]    CPET              Patient Active Problem List    Diagnosis Date Noted   • RBBB 05/13/2024   • CAD, multiple vessel 05/13/2024   • Class 2 obesity 05/13/2024   • 1st degree AV block 05/13/2024   • Left main coronary artery disease 05/13/2024   • Chronic systolic heart failure (HCC) 04/23/2024   • Pericardial effusion 04/23/2024   • Radiation-induced heart disease 04/23/2024   • Primary hypertension 04/23/2024   • Mixed hyperlipidemia 04/23/2024   • Cardiomyopathy (HCC) 07/05/2023   • Family history of ischemic bowel disease 07/05/2023   • Plantar fasciitis 08/28/2018   • Mononeuropathy 11/04/2016   • Hypercholesterolemia 04/09/2014       Past Medical History:   Diagnosis Date   • 1st degree AV block 05/13/2024   • CAD, multiple vessel 05/13/2024   • Cardiomyopathy (HCC)    • Chronic systolic heart failure (HCC) 04/23/2024   • Class 2 obesity 05/13/2024   • Hodgkin's lymphoma (HCC)    • Hypercholesterolemia    • Hyperlipidemia    • Nephrolithiasis    • Pericardial effusion 04/23/2024   • Plantar fasciitis 08/28/2018   • Pre-diabetes    • Primary hypertension 04/23/2024   • Radiation-induced heart disease 04/23/2024   • RBBB 05/13/2024       Allergies   Allergen Reactions   • Pollen Extract Eye Swelling     Reports allergy to pollens during the summer       Current Outpatient Medications   Medication Instructions   • aspirin  "81 mg, Oral, Daily   • atorvastatin (LIPITOR) 80 mg, Oral, Daily   • dapagliflozin (FARXIGA) 10 mg, Oral, Daily   • metoprolol succinate (TOPROL-XL) 75 mg, Oral, 2 times daily   • nitroglycerin (NITROSTAT) 0.4 mg, Sublingual, Every 5 minutes PRN   • sacubitril-valsartan (Entresto) 49-51 MG TABS 1 tablet, Oral, 2 times daily       Social History     Socioeconomic History   • Marital status: /Civil Union     Spouse name: Not on file   • Number of children: Not on file   • Years of education: Not on file   • Highest education level: Not on file   Occupational History   • Not on file   Tobacco Use   • Smoking status: Light Smoker     Types: Cigars   • Smokeless tobacco: Never   • Tobacco comments:     Patient smokes cigars 3-4 times a week   Vaping Use   • Vaping status: Never Used   Substance and Sexual Activity   • Alcohol use: Yes     Comment: social   • Drug use: No   • Sexual activity: Not on file   Other Topics Concern   • Not on file   Social History Narrative   • Not on file     Social Determinants of Health     Financial Resource Strain: Not on file   Food Insecurity: Not on file   Transportation Needs: Not on file   Physical Activity: Inactive (6/22/2019)    Exercise Vital Sign    • Days of Exercise per Week: 0 days    • Minutes of Exercise per Session: 0 min   Stress: No Stress Concern Present (5/1/2023)    [employer] - Occupational Stress Questionnaire    • Feeling of Stress : Not at all   Social Connections: Not on file   Intimate Partner Violence: Not on file   Housing Stability: Not on file       Family History   Problem Relation Age of Onset   • Hypertension Mother    • Heart attack Father         age 59.  was a heavy drinker.       Physical Exam:  Blood pressure 136/74, pulse 86, height 6' 1\" (1.854 m), weight 127 kg (280 lb), SpO2 98%.  Body mass index is 36.94 kg/m².  Wt Readings from Last 3 Encounters:   06/11/24 127 kg (280 lb)   05/28/24 125 kg (275 lb 4.8 oz) " "  05/13/24 126 kg (277 lb)     Physical Exam  Vitals reviewed.   Constitutional:       General: He is not in acute distress.     Appearance: He is not toxic-appearing.   Neck:      Comments: No JVP elevation  Cardiovascular:      Rate and Rhythm: Normal rate and regular rhythm.      Heart sounds: Murmur (systolic at base) heard.      No friction rub. No gallop.   Pulmonary:      Breath sounds: Normal breath sounds. No wheezing, rhonchi or rales.   Musculoskeletal:      Comments: Trace LE edema   Neurological:      Mental Status: He is alert.         Labs & Results:  Lab Results   Component Value Date    SODIUM 139 05/07/2024    K 4.6 05/07/2024     05/07/2024    CO2 30 05/07/2024    BUN 17 05/07/2024    CREATININE 1.06 05/07/2024    GLUC 126 05/07/2024    CALCIUM 9.6 05/07/2024     No results found for: \"NTBNP\"       Time Statement:  I have spent a total time of 44 minutes on 06/11/24 in caring for this patient including Diagnostic results, Prognosis, Risks and benefits of tx options, Instructions for management, Patient and family education, Importance of tx compliance, Risk factor reductions, Impressions, Counseling / Coordination of care, Documenting in the medical record, Reviewing / ordering tests, medicine, procedures  , and Obtaining or reviewing history  .      Thank you for the opportunity to participate in the care of this patient.    Mayco Nayak MD, [facility]  Staff Cardiologist  Director of Cardio-Oncology  [facility]  "

## 2024-07-03 DIAGNOSIS — E78.2 MIXED HYPERLIPIDEMIA: ICD-10-CM

## 2024-07-03 DIAGNOSIS — I25.5 ISCHEMIC CARDIOMYOPATHY: ICD-10-CM

## 2024-07-03 RX ORDER — METOPROLOL SUCCINATE 50 MG/1
TABLET, EXTENDED RELEASE ORAL
Qty: 270 TABLET | Refills: 1 | Status: SHIPPED | OUTPATIENT
Start: 2024-07-03

## 2024-07-03 RX ORDER — ATORVASTATIN CALCIUM 80 MG/1
80 TABLET, FILM COATED ORAL DAILY
Qty: 90 TABLET | Refills: 1 | Status: SHIPPED | OUTPATIENT
Start: 2024-07-03

## 2024-07-23 ENCOUNTER — OFFICE VISIT (OUTPATIENT)
Dept: CARDIOLOGY CLINIC | Facility: CLINIC | Age: 54
End: 2024-07-23
Payer: COMMERCIAL

## 2024-07-23 VITALS
HEART RATE: 86 BPM | OXYGEN SATURATION: 97 % | SYSTOLIC BLOOD PRESSURE: 118 MMHG | BODY MASS INDEX: 36.65 KG/M2 | WEIGHT: 276.5 LBS | DIASTOLIC BLOOD PRESSURE: 68 MMHG | HEIGHT: 73 IN

## 2024-07-23 DIAGNOSIS — I51.9 RADIATION-INDUCED HEART DISEASE: ICD-10-CM

## 2024-07-23 DIAGNOSIS — I50.22 CHRONIC SYSTOLIC HEART FAILURE (HCC): ICD-10-CM

## 2024-07-23 DIAGNOSIS — I25.10 CORONARY ARTERY DISEASE INVOLVING NATIVE CORONARY ARTERY OF NATIVE HEART WITHOUT ANGINA PECTORIS: ICD-10-CM

## 2024-07-23 DIAGNOSIS — I31.39 PERICARDIAL EFFUSION: ICD-10-CM

## 2024-07-23 DIAGNOSIS — I25.5 ISCHEMIC CARDIOMYOPATHY: Primary | ICD-10-CM

## 2024-07-23 PROCEDURE — 99214 OFFICE O/P EST MOD 30 MIN: CPT | Performed by: INTERNAL MEDICINE

## 2024-07-23 NOTE — PROGRESS NOTES
Cardio-Oncology / Heart Failure Cardiology Clinic Note    Marcelina Ascencio 54 y.o. male   MRN: [id number]  Encounter: [id number]        Assessment / Plan:    # Cardio-Oncology Pertinent History  Hodgkin's lymphoma  Dx age 19  Initial treatment included - ABVD.     Relapse 1 year later, had MOPP and chest radiation.  Hx of bone marrow transplant 1991    # ischemic cardiomyopathy  # HFrEF - chronic    ETIOLOGY:  - new dx 6/2023.  EF 40%.   - [facility] 5-2024 - multivessel CAD  - suspect 2/2 CAD +/- prior anthracycline therapy    VOLUME:  - not requiring diuretic  - Exam - euvolemic  - baseline BNP - 73    GDMT:  - toprol 75 BID  - entresto 49-51 BID  - spironlactone caused hyperkalemia and was DC'd  - dapagliflozin 10mg daily    DEVICE:  - has RBBB  - optimize GDMT, CABG, and then repeat echo to re-eval EF    # CAD  New dx on [facility] 5-7-24  ASA, statin  Plan for CABG in August  Discussed ER if any chest pain  Can order cardiac rehab after surgery    # small-moderate pericardial effusion  Seen on 2023 and 2024 echo.  ? Due to prior radiation  No evidence of tamponade  Will need serial f/u of this going forward    # Radiation heart disease  Echo shows significant MAC.  The aortic valve is calcified and there is mild to moderate AI and mild AS.  Will need serial follow-up of valve disease as it is likely to progress    # HTN  See GDMT above.      # HLD  Last LDL 91  ---> increased lipitor to 80mg.       Today's Plan Summary:  See above assessment/plan for full details of today's plan.  Briefly,     CABG August 5.                  Reason For Visit / Chief Complaint:  F/u -  CAD, cardiomyopathy, and history of cancer therapies for lymphoma    HPI:   Marcelina Ascencio is a 54 y.o.  male with history as noted in the problem list and further detailed in the above assessment and plan.    Initial:   April 2024  Referred by Dr. Butler (general cardiology) for a new patient visit for dilated cardiomyopathy and history of cancer therapies for  lymphoma  As above, the patient has a distant history of lymphoma.  Treatment included anthracycline based chemotherapy and radiation.  He also had a bone marrow transplant.  In June 2023 he had some shortness of breath for which an echo was obtained and demonstrating an EF of 43%.  This was a new diagnosis.  He was referred to cardiology and started on beta-blocker/ACE.  A repeat echo in January 2024 reported an EF down to 35% (although the biplane measurement was 43%).  Given his history of anthracycline and radiation he was referred to cardio-oncology/heart failure clinic.  Today, the patient reports -  no chest pain.  Does get BARRERA.   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   .  Had a daughter.  Smokes cigars.      Interval:  Last visit -->   reviewed recent [facility].   Saw CT surgery and CABG recommended.  Patient decided to get a second opinion at [facility].  CABG tentatively scheduled for August.    Plan last visit -->   Increase toprol to 75 twice daily  Increase Lipitor to 80    Today -  getting a URI lately.  No fevers or chills.  No chest pain.  No SOB.  No syncope.             Cardiac Imaging personally reviewed:  EKG 7-5-23  Sinus rhythm with first-degree AV block  Right bundle branch block    4-23-24  Sinus rhythm with first-degree AV block  Right bundle branch block       Holter or event monitor    Echo 6-6-23  EF 43%  Small pericardial effusion    1-16-24  LVIDd 5.5cm.   EF 35% per report (43% by biplane)  Normal RV size and function  Mild AS.  Mild-mod AI  Small-moderate pericardial effusion, no tamponade    5-7-24  EF 40%.  Mild AS.  Mild-mod AI  mall-moderate pericardial effusion, no tamponade       SOLANGE    Cardiac MRI    Stress testing    Coronary CTA or [facility] - 05/07/24   ·  Ost LM lesion is 60% stenosed.  ·  Mid-distal LM lesion is 70% stenosed.  ·  Ost LAD lesion is 80% stenosed.  ·  Ost Cx to Prox Cx lesion is 85% stenosed.  ·  Mid Cx lesion is 60% stenosed.  ·  Dist RCA lesion is  70% stenosed.       [facility]    CPET              Patient Active Problem List    Diagnosis Date Noted   • RBBB 05/13/2024   • CAD, multiple vessel 05/13/2024   • Class 2 obesity 05/13/2024   • 1st degree AV block 05/13/2024   • Left main coronary artery disease 05/13/2024   • Chronic systolic heart failure (HCC) 04/23/2024   • Pericardial effusion 04/23/2024   • Radiation-induced heart disease 04/23/2024   • Primary hypertension 04/23/2024   • Mixed hyperlipidemia 04/23/2024   • Cardiomyopathy (HCC) 07/05/2023   • Family history of ischemic bowel disease 07/05/2023   • Plantar fasciitis 08/28/2018   • Mononeuropathy 11/04/2016   • Hypercholesterolemia 04/09/2014       Past Medical History:   Diagnosis Date   • 1st degree AV block 05/13/2024   • CAD, multiple vessel 05/13/2024   • Cardiomyopathy (HCC)    • Chronic systolic heart failure (HCC) 04/23/2024   • Class 2 obesity 05/13/2024   • Hodgkin's lymphoma (HCC)    • Hypercholesterolemia    • Hyperlipidemia    • Nephrolithiasis    • Pericardial effusion 04/23/2024   • Plantar fasciitis 08/28/2018   • Pre-diabetes    • Primary hypertension 04/23/2024   • Radiation-induced heart disease 04/23/2024   • RBBB 05/13/2024       Allergies   Allergen Reactions   • Pollen Extract Eye Swelling     Reports allergy to pollens during the summer       Current Outpatient Medications   Medication Instructions   • aspirin 81 mg, Oral, Daily   • atorvastatin (LIPITOR) 80 mg, Oral, Daily   • dapagliflozin (FARXIGA) 10 mg, Oral, Daily   • metoprolol succinate (TOPROL-XL) 50 mg 24 hr tablet TAKE 1.5 TABLETS BY MOUTH 2 TIMES A DAY.   • nitroglycerin (NITROSTAT) 0.4 mg, Sublingual, Every 5 minutes PRN   • sacubitril-valsartan (Entresto) 49-51 MG TABS 1 tablet, Oral, 2 times daily       Social History     Socioeconomic History   • Marital status: /Civil Union     Spouse name: Not on file   • Number of children: Not on file   • Years of education: Not on file   • Highest education level:  "Not on file   Occupational History   • Not on file   Tobacco Use   • Smoking status: Light Smoker     Types: Cigars   • Smokeless tobacco: Never   • Tobacco comments:     Patient smokes cigars 3-4 times a week   Vaping Use   • Vaping status: Never Used   Substance and Sexual Activity   • Alcohol use: Yes     Comment: social   • Drug use: No   • Sexual activity: Not on file   Other Topics Concern   • Not on file   Social History Narrative   • Not on file     Social Determinants of Health     Financial Resource Strain: Not on file   Food Insecurity: Not on file   Transportation Needs: Not on file   Physical Activity: Inactive (6/22/2019)    Exercise Vital Sign    • Days of Exercise per Week: 0 days    • Minutes of Exercise per Session: 0 min   Stress: No Stress Concern Present (5/1/2023)    [employer] - Occupational Stress Questionnaire    • Feeling of Stress : Not at all   Social Connections: Unknown (6/18/2024)    Received from [employer]    Social Connections    • How often do you feel lonely or isolated from those around you? (Adult - for ages 18 years and over): Not on file   Intimate Partner Violence: Not on file   Housing Stability: Not on file       Family History   Problem Relation Age of Onset   • Hypertension Mother    • Heart attack Father         age 59.  was a heavy drinker.       Physical Exam:  Blood pressure 118/68, pulse 86, height 6' 1\" (1.854 m), weight 125 kg (276 lb 8 oz), SpO2 97%.  Body mass index is 36.48 kg/m².  Wt Readings from Last 3 Encounters:   07/23/24 125 kg (276 lb 8 oz)   06/11/24 127 kg (280 lb)   05/28/24 125 kg (275 lb 4.8 oz)     Physical Exam  Vitals reviewed.   Constitutional:       General: He is not in acute distress.     Appearance: He is not toxic-appearing.   Neck:      Comments: No JVD  Cardiovascular:      Rate and Rhythm: Normal rate and regular rhythm.      Heart sounds: Murmur (systolic at base) heard.      No friction rub. No gallop. " "  Pulmonary:      Breath sounds: Normal breath sounds. No wheezing, rhonchi or rales.   Musculoskeletal:      Comments: Trace LE edema   Neurological:      Mental Status: He is alert.         Labs & Results:  Lab Results   Component Value Date    SODIUM 139 05/07/2024    K 4.6 05/07/2024     05/07/2024    CO2 30 05/07/2024    BUN 17 05/07/2024    CREATININE 1.06 05/07/2024    GLUC 126 05/07/2024    CALCIUM 9.6 05/07/2024     No results found for: \"NTBNP\"       Thank you for the opportunity to participate in the care of this patient.    Mayco Nayak MD, [facility]  Staff Cardiologist  Director of Cardio-Oncology  [facility]  "

## 2024-08-12 ENCOUNTER — TELEPHONE (OUTPATIENT)
Age: 54
End: 2024-08-12

## 2024-08-12 NOTE — TELEPHONE ENCOUNTER
"Hello,    The following message was sent via e-mail to the leadership team:     Please advise if you can help facilitate the following overbook request:    Patient Name: Marcelina Ascencio    Patient MRN: [id number]    Call back #:     Insurance: [employer] [facility]    Department:Cardiology    Speciality: Cardio-Oncology    Reason for overbook request: PROVIDER REQUEST    Comments (Write \"N/a\" if no comments): Pt had open heart surgery on 8/05/24 and surgeon requested that PT see Dr Nayak sooner than the Oct. 8th appt    Requested doctor and location: [facility]    Date of current appointment: 10/08/24      Thank you.       "

## 2024-08-27 ENCOUNTER — OFFICE VISIT (OUTPATIENT)
Dept: CARDIOLOGY CLINIC | Facility: CLINIC | Age: 54
End: 2024-08-27
Payer: COMMERCIAL

## 2024-08-27 VITALS
HEART RATE: 90 BPM | HEIGHT: 73 IN | BODY MASS INDEX: 34.99 KG/M2 | SYSTOLIC BLOOD PRESSURE: 96 MMHG | DIASTOLIC BLOOD PRESSURE: 60 MMHG | OXYGEN SATURATION: 97 % | WEIGHT: 264 LBS

## 2024-08-27 DIAGNOSIS — I51.9 RADIATION-INDUCED HEART DISEASE: ICD-10-CM

## 2024-08-27 DIAGNOSIS — I10 PRIMARY HYPERTENSION: ICD-10-CM

## 2024-08-27 DIAGNOSIS — E78.2 MIXED HYPERLIPIDEMIA: ICD-10-CM

## 2024-08-27 DIAGNOSIS — I25.5 ISCHEMIC CARDIOMYOPATHY: ICD-10-CM

## 2024-08-27 DIAGNOSIS — I50.22 CHRONIC SYSTOLIC HEART FAILURE (HCC): ICD-10-CM

## 2024-08-27 DIAGNOSIS — I25.10 CAD IN NATIVE ARTERY: Primary | ICD-10-CM

## 2024-08-27 DIAGNOSIS — I31.39 PERICARDIAL EFFUSION: ICD-10-CM

## 2024-08-27 PROCEDURE — 99215 OFFICE O/P EST HI 40 MIN: CPT | Performed by: INTERNAL MEDICINE

## 2024-08-27 RX ORDER — DILTIAZEM HYDROCHLORIDE 120 MG/1
120 CAPSULE, COATED, EXTENDED RELEASE ORAL DAILY
COMMUNITY
Start: 2024-08-07

## 2024-08-27 RX ORDER — CLOPIDOGREL BISULFATE 75 MG/1
1 TABLET ORAL DAILY
COMMUNITY
Start: 2024-08-08 | End: 2024-09-07

## 2024-08-27 NOTE — PROGRESS NOTES
Cardio-Oncology / Heart Failure Cardiology Clinic Note    Marcelina Ascencio 54 y.o. male   MRN: [id number]  Encounter: [id number]        Assessment / Plan:    # Cardio-Oncology Pertinent History  Hodgkin's lymphoma  Dx age 19  Initial treatment included - ABVD.     Relapse 1 year later, had MOPP and chest radiation.  Hx of bone marrow transplant 1991    # ischemic cardiomyopathy  # HFrEF - chronic    ETIOLOGY:  - new dx 6/2023.  EF 43% at that time.  - [facility] 5-2024 - multivessel CAD  - suspect 2/2 CAD +/- prior anthracycline therapy    VOLUME:  - not requiring diuretic  - Exam - euvolemic  - baseline BNP - 73    GDMT:  - toprol 25 daily (was reduced by cardiac surgery)  - entresto  (held post CABG - ideally ultimately restart if BP and K ok)  - spironlactone caused hyperkalemia and was DC'd  - dapagliflozin 10mg daily  - CT surgery added dilt - eventually should stop this given LV dysfunction    DEVICE:  - has RBBB  - EF 45%    # CAD s/p CABG  New dx on [facility] May 2024.  S/p CABG x 3 in Aug 2024.  LIMA-LAD, TBI-[id number], radial-OM.  Also had PERLA clip.   ASA, plavix  (unclear how long CT surgery wants DAPT)  statin  Ultimately, can order cardiac rehab once cleared by surgery    # small-moderate pericardial effusion  Seen on 2023 and 2024 echo.  ? Due to prior radiation  No evidence of tamponade  Will need serial f/u of this going forward  (improved on post CABG echo)    # Radiation heart valve disease  Echo shows significant MAC.  The aortic valve is calcified and there is mild to moderate AI and mild-moderate AS.  Will need serial follow-up of valve disease as it is likely to progress    # HTN  See GDMT above.      # HLD  Last LDL 91  ---> increased lipitor to 80mg.  -->  CT surgery reduced to 40mg post CABG      Today's Plan Summary:  See above assessment/plan for full details of today's plan.  Briefly,     BP soft today.  Stay hydrated.   Montior BP at home.  Monitor chest tube incision site - call surgery if drainage /pus  develops  Complex plan as above.  No med changes today            Reason For Visit / Chief Complaint:  F/u -  CAD, cardiomyopathy, and history of cancer therapies for lymphoma    HPI:   Marcelina Ascencio is a 54 y.o.  male with history as noted in the problem list and further detailed in the above assessment and plan.    Initial:   April 2024  Referred by Dr. Butler (general cardiology) for a new patient visit for dilated cardiomyopathy and history of cancer therapies for lymphoma  As above, the patient has a distant history of lymphoma.  Treatment included anthracycline based chemotherapy and radiation.  He also had a bone marrow transplant.  In June 2023 he had some shortness of breath for which an echo was obtained and demonstrating an EF of 43%.  This was a new diagnosis.  He was referred to cardiology and started on beta-blocker/ACE.  A repeat echo in January 2024 reported an EF down to 35% (although the biplane measurement was 43%).  Given his history of anthracycline and radiation he was referred to cardio-oncology/heart failure clinic.  Today, the patient reports -  no chest pain.  Does get BARRERA.   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   .  Had a daughter.  Smokes cigars.      Interval:  Last visit -->   stable sx's    Plan last visit -->   upcoming CABG August 5    Surgery August 5.  CABG x 3.  LIMA-LAD, UYD-[id number]-cSSB, radial-OM.  Also had PERLA clip.     Surgery stopped entresto, added diltiazem.     Echo - 8-7-24  ([facility] - post CABG)  EF 45%  Moderate aortic stenosis    Today  - feeling better each day.   Still sternal pain.  No chest pain.   No SOB.   No leg edema.            Cardiac Imaging personally reviewed:  EKG 7-5-23  Sinus rhythm with first-degree AV block  Right bundle branch block    4-23-24  Sinus rhythm with first-degree AV block  Right bundle branch block       Holter or event monitor    Echo 6-6-23  EF 43%  Small pericardial effusion    1-16-24  LVIDd 5.5cm.   EF 35% per report  (43% by biplane)  Normal RV size and function  Mild AS.  Mild-mod AI  Small-moderate pericardial effusion, no tamponade    5-7-24  EF 40%.  Mild AS.  Mild-mod AI  mall-moderate pericardial effusion, no tamponade    8-7-24  (OSH - post CABG)  EF 45%  Moderate aortic stenosis         SOLANGE    Cardiac MRI    Stress testing    Coronary CTA or [facility] - 05/07/24   ·  Ost LM lesion is 60% stenosed.  ·  Mid-distal LM lesion is 70% stenosed.  ·  Ost LAD lesion is 80% stenosed.  ·  Ost Cx to Prox Cx lesion is 85% stenosed.  ·  Mid Cx lesion is 60% stenosed.  ·  Dist RCA lesion is 70% stenosed.       [facility]    CPET              Patient Active Problem List    Diagnosis Date Noted   • RBBB 05/13/2024   • CAD, multiple vessel 05/13/2024   • Class 2 obesity 05/13/2024   • 1st degree AV block 05/13/2024   • Left main coronary artery disease 05/13/2024   • Chronic systolic heart failure (HCC) 04/23/2024   • Pericardial effusion 04/23/2024   • Radiation-induced heart disease 04/23/2024   • Primary hypertension 04/23/2024   • Mixed hyperlipidemia 04/23/2024   • Cardiomyopathy (HCC) 07/05/2023   • Family history of ischemic bowel disease 07/05/2023   • Plantar fasciitis 08/28/2018   • Mononeuropathy 11/04/2016   • Hypercholesterolemia 04/09/2014       Past Medical History:   Diagnosis Date   • 1st degree AV block 05/13/2024   • CAD, multiple vessel 05/13/2024   • Cardiomyopathy (HCC)    • Chronic systolic heart failure (HCC) 04/23/2024   • Class 2 obesity 05/13/2024   • Hodgkin's lymphoma ([facility])    • Hypercholesterolemia    • Hyperlipidemia    • Nephrolithiasis    • Pericardial effusion 04/23/2024   • Plantar fasciitis 08/28/2018   • Pre-diabetes    • Primary hypertension 04/23/2024   • Radiation-induced heart disease 04/23/2024   • RBBB 05/13/2024       Allergies   Allergen Reactions   • Pollen Extract Eye Swelling     Reports allergy to pollens during the summer       Current Outpatient Medications   Medication Instructions   • aspirin 81  mg, Oral, Daily   • atorvastatin (LIPITOR) 80 mg, Oral, Daily   • clopidogrel (PLAVIX) 75 mg tablet 1 tablet, Oral, Daily   • dapagliflozin (FARXIGA) 10 mg, Oral, Daily   • diltiazem (CARDIZEM CD) 120 mg, Oral, Daily   • metoprolol succinate (TOPROL-XL) 50 mg 24 hr tablet TAKE 1.5 TABLETS BY MOUTH 2 TIMES A DAY.   • nitroglycerin (NITROSTAT) 0.4 mg, Sublingual, Every 5 minutes PRN       Social History     Socioeconomic History   • Marital status: /Civil Union     Spouse name: Not on file   • Number of children: Not on file   • Years of education: Not on file   • Highest education level: Not on file   Occupational History   • Not on file   Tobacco Use   • Smoking status: Light Smoker     Types: Cigars   • Smokeless tobacco: Never   • Tobacco comments:     Patient smokes cigars 3-4 times a week   Vaping Use   • Vaping status: Never Used   Substance and Sexual Activity   • Alcohol use: Yes     Comment: social   • Drug use: No   • Sexual activity: Not on file   Other Topics Concern   • Not on file   Social History Narrative   • Not on file     Social Determinants of Health     Financial Resource Strain: Not on file   Food Insecurity: Not on file   Transportation Needs: Not on file   Physical Activity: Inactive (6/22/2019)    Exercise Vital Sign    • Days of Exercise per Week: 0 days    • Minutes of Exercise per Session: 0 min   Stress: No Stress Concern Present (5/1/2023)    [employer] [place] [employer] - Occupational Stress Questionnaire    • Feeling of Stress : Not at all   Social Connections: Unknown (6/18/2024)    Received from [employer]    Social Connections    • How often do you feel lonely or isolated from those around you? (Adult - for ages 18 years and over): Not on file   Intimate Partner Violence: Not on file   Housing Stability: Not on file       Family History   Problem Relation Age of Onset   • Hypertension Mother    • Heart attack Father         age 59.  was a heavy drinker.  "      Physical Exam:  Blood pressure 96/60, pulse 90, height 6' 1\" (1.854 m), weight 120 kg (264 lb), SpO2 97%.  Body mass index is 34.83 kg/m².  Wt Readings from Last 3 Encounters:   08/27/24 120 kg (264 lb)   07/23/24 125 kg (276 lb 8 oz)   06/11/24 127 kg (280 lb)     Physical Exam  Vitals reviewed.   Constitutional:       General: He is not in acute distress.     Appearance: He is not toxic-appearing.   Neck:      Comments: No JVD  Cardiovascular:      Rate and Rhythm: Normal rate and regular rhythm.      Heart sounds: Murmur (systolic at base) heard.      No friction rub. No gallop.   Pulmonary:      Breath sounds: Normal breath sounds. No wheezing, rhonchi or rales.   Musculoskeletal:      Comments: No edema   Neurological:      Mental Status: He is alert.         Labs & Results:  Lab Results   Component Value Date    SODIUM 139 05/07/2024    K 4.6 05/07/2024     05/07/2024    CO2 30 05/07/2024    BUN 17 05/07/2024    CREATININE 1.06 05/07/2024    GLUC 126 05/07/2024    CALCIUM 9.6 05/07/2024     No results found for: \"NTBNP\"     Time Statement:  I have spent a total time of 43 minutes in caring for this patient on the day of the visit/encounter including Diagnostic results, Prognosis, Instructions for management, Patient and family education, Importance of tx compliance, Risk factor reductions, Impressions, Counseling / Coordination of care, Documenting in the medical record, Reviewing / ordering tests, medicine, procedures  , and Obtaining or reviewing history  .      Thank you for the opportunity to participate in the care of this patient.    Mayco Nayak MD, [facility]  Staff Cardiologist  Director of Cardio-Oncology  [facility]  "

## 2024-09-09 ENCOUNTER — TELEPHONE (OUTPATIENT)
Age: 54
End: 2024-09-09

## 2024-09-09 DIAGNOSIS — I25.10 CAD, MULTIPLE VESSEL: Primary | ICD-10-CM

## 2024-09-09 NOTE — TELEPHONE ENCOUNTER
Caller: Marcelina Ascencio    Doctor: Dr. Nayak    Call back #: [phone]    Reason for call: Patient was recently cleared by his doctor to begin cardiac rehab. Patient would like a recommendation/referral from Dr. Nayak if necessary. Patient prefers a cardiac rehab office near his location in [place] if possible. Please call patient back. Thank you!

## 2024-09-10 NOTE — TELEPHONE ENCOUNTER
Called pt to let him know Card Rehab referral was placed. Left my direct TEAMs number for any f/u questions.

## 2024-09-13 ENCOUNTER — TRANSCRIBE ORDERS (OUTPATIENT)
Dept: CARDIAC REHAB | Facility: CLINIC | Age: 54
End: 2024-09-13

## 2024-09-13 DIAGNOSIS — Z95.1 S/P CABG X 3: Primary | ICD-10-CM

## 2024-09-18 ENCOUNTER — CLINICAL SUPPORT (OUTPATIENT)
Dept: CARDIAC REHAB | Facility: CLINIC | Age: 54
End: 2024-09-18
Payer: COMMERCIAL

## 2024-09-18 DIAGNOSIS — I25.10 CAD, MULTIPLE VESSEL: ICD-10-CM

## 2024-09-18 DIAGNOSIS — Z95.1 S/P CABG X 3: Primary | ICD-10-CM

## 2024-09-18 PROCEDURE — 93797 PHYS/QHP OP CAR RHAB WO ECG: CPT

## 2024-09-18 NOTE — PROGRESS NOTES
CARDIAC REHABILITATION   ASSESSMENT AND INDIVIDUALIZED TREATMENT PLAN  INITIAL           Today's date: 2024   # of Exercise Sessions Completed: initial + 1   Patient name: Marcelina Ascencio      : 1970  Age: 54 y.o.       MRN: [id number]  Referring Physician: Mayco Nayak MD  Cardiologist: Mayco Nayak MD  Provider: Gilles  Clinician: Mulugeta Pearce MS,CEP,EPC        Treatment is tailored to this patient's individual needs.  The ITP was reviewed with the patient and all questions were answered to their satisfaction.  Additional ITP documentation can be found electronically including daily and monthly exercise summaries, daily session notes with ECG summaries, education notes, daily medication reconciliation, and daily physician supervision.      Comments:  2024 Marcelina will begin CR at [facility] on 2024 at 9:30 am for 36 sessions. Pt is excited to get started and would like to strength trained when cleared from wt restriction.        Dx:   Encounter Diagnoses   Name Primary?    CAD, multiple vessel     S/P CABG x 3 Yes       Description of Diagnosis: s/p CABG x3 (LIMA-LAD, CWQ-afsewn-dZRF, radial-OM), PERLA clip   Date of onset: 2024  Other Cardiac History: cardiomyopathy,chronic systolic heart failure,pradiation induced heart disease,1AVB,RBBB,HLD,HTN        ASSESSMENT    Medical History:   Past Medical History:   Diagnosis Date    1st degree AV block 2024    CAD, multiple vessel 2024    Cardiomyopathy (HCC)     Chronic systolic heart failure (HCC) 2024    Class 2 obesity 2024    Hodgkin's lymphoma (HCC)     Hypercholesterolemia     Hyperlipidemia     Nephrolithiasis     Pericardial effusion 2024    Plantar fasciitis 2018    Pre-diabetes     Primary hypertension 2024    Radiation-induced heart disease 2024    RBBB 2024       Family History:  Family History   Problem Relation Age of Onset    Hypertension Mother     Heart attack Father          age 59.  was a heavy drinker.       Allergies:   Pollen extract    Current Medications:   Current Outpatient Medications   Medication Sig Dispense Refill    aspirin 81 mg chewable tablet Chew 1 tablet (81 mg total) daily 30 tablet 5    atorvastatin (LIPITOR) 80 mg tablet TAKE 1 TABLET BY MOUTH EVERY DAY (Patient taking differently: Take 40 mg by mouth daily) 90 tablet 1    clopidogrel (PLAVIX) 75 mg tablet Take 1 tablet by mouth daily      dapagliflozin (Farxiga) 10 MG tablet Take 1 tablet (10 mg total) by mouth daily 90 tablet 5    diltiazem (CARDIZEM CD) 120 mg 24 hr capsule Take 120 mg by mouth daily      metoprolol succinate (TOPROL-XL) 50 mg 24 hr tablet TAKE 1.5 TABLETS BY MOUTH 2 TIMES A DAY. (Patient taking differently: Take 25 mg by mouth daily) 270 tablet 1    nitroglycerin (NITROSTAT) 0.4 mg SL tablet Place 1 tablet (0.4 mg total) under the tongue every 5 (five) minutes as needed for chest pain 25 tablet 5     No current facility-administered medications for this visit.       Medication compliance: Yes   Comments: Pt reports to be compliant with medications    Physical Limitations: none     Fall Risk: Moderate   Comments: Ambulates with a steady gait with no assist device and Denies a fall in the past 6 months    Cultural needs: none      CAD Risk Factors:  Cholesterol: Yes  HTN: Yes  DM: No  Obesity: Yes   Inactivity: Yes      EXERCISE ASSESSMENT:     Initial Fitness Assessment: Submaximal TM ETT:  Resting:  BP: 106/66  HR: 87, Exercise:  BP: 108//62  HR: 100-119, and METs:  4.3      ECG INTERPRETATION:  NSR,RBBB,1AVB,isolated PVC    Current Functional Status:  Occupation: full time job [employer]   Recreation/Physical Activity: [place]   ADL’s:resumed all ADLs within sternal precautions  [place]: resumed all ADLs within sternal precautions  Home exercise: none  Other Comments: none      SMART Exercise Goals:   10% improvement in functional capacity based on max METs achieved in  initial fitness assessment  reduced dyspnea with physical activity    improved DASI score by 10%  increased exercise capacity by 40% based on peak METs tolerated in cardiac rehab exercise session  maintain > 150 minutes per week of moderate intensity exercise    Patient Specific EXERCISE GOALS:       Increase strength to carry heavy appliances  Decrease wt by 15 lbs in 12 weeks  Increase stamina and energy     Functional Capacity Screening Tool:  [facility] Activity Status Index:  5.07 METs      PSYCHOSOCIAL ASSESSMENT:    Date of last Assessment:  9/18/2024  Depression screening:  PHQ-9 = 5    Interpretation:  5-9 = Mild Depression  Anxiety screening:  STEPHEN-7 = 1    Interpretation: 0-4  = Not anxious    Pt self-report of depression and anxiety   Patient reports they are coping well with good social support and denies depression or anxiety  Reports sufficient emotional support     Self-reported stress level:  2   Stressors:  pt reports not stress at the moment   Stress Management Tools: TV and music    SMART Psychosocial Goals:     Physical Fitness in [place] Score < 3, Daily Activity in [place] Score < 3, Social Activities in [place] Score < 3, Pain in [place] Score < 3, Overall Health in [place] Score < 3, improved sleeping habits, feel less tired with more energy, Improved appetite control, and take time to relax    Patient Specific PSYCHOCOSOCIAL GOALS:    Reports no psychosocial concerns that will get in the way of rehab  Decrease the thought of a reoccurrence of a cardiac event      Quality of Life Screen:  (Higher score indicates disease impact on QOL)  [facility] score: 21/45     Social Support:   spouse and children  Community/Social Activities: none     Psychosocial Assessment as it relates to rehabilitation:   Patient denies issues with his/her family or home life that may affect their rehabilitation efforts.       NUTRITION ASSESSMENT:    Initial Weight:  264.2  Current Weight: 264.2    Height:  "  Ht Readings from Last 1 Encounters:   08/27/24 6' 1\" (1.854 m)       Rate Your Plate Score: 68/81    Diabetes: N/A  A1c: 5.8    last measured: 8/4/2024    Lipid management: Discussed diet and lipid management and Last lipid profile 1/2/2024  Chol 174    HDL 53  LDL 91    Current Dietary Habits:  Mediterranean diet   Eats a large amount of bread  SMART Nutrition Goals:   reduced BMI to < 25, TRG <150, decreased body fat% <25%   (M), Wt. loss 1 ppw,  goal of 15 lbs., eat whole grain breads, brown rice and whole grain cereals, eat 5 or more servings of fruits and vegetables a day, and rarely eat processed meats or eat low fat processed meats    Patient Specific NUTRITION GOALS:     1. Decrease the amount of bread consumed    2. Increase water consumption to 64 ounces    3. Pick low sodium lunch meats     Drug/Alcohol Use:   Yes, social drinker       OTHER CORE COMPONENT ASSESSMENT:    Tobacco Use:     N/A:  Patient is a non-smoker     Anginal Symptoms:  chest pressure   NTG use: Compliant with carrying NTG and Understands proper use    SMART Goals:   consistent, controlled resting BP < 130/80, medication compliance, and reduced angina    Patient Specific CORE COMPONENT GOALS:    Carry NTG at all times   Monitor BP at home         INDIVIDUALIZED TREATMENT PLAN      EXERCISE GOALS and PLAN      Progress toward Exercise goals:   Reviewed Pt goals and determined plan of care walk outdoors 3-4 days/week 10-15 min    Exercise Intervention/plan:    education on home exercise guidelines, home exercise 30+ mins 2 days opposite CR, Group class: Risk Factors for Heart Disease, Patient education:   Exercise After Cardiac Rehabilitation, and After discharge patient will continue to follow a regular exercise regimen with the goal of a minimum of 150 minutes per week of moderate intensity exercise.      The patient was counseled on exercise guidelines to achieve a minimum of 150 mins/wk of moderate intensity (RPE 4-6) "   exercise and encouraged to add 1-2 days of exercise on opposite days of cardiac rehab as tolerated.       PHYSICIAN PRESCRIBED EXERCISE:    Current Aerobic Exercise Prescription:      Frequency: 1 days/week   Supplement with home exercise 2+ days/wk as tolerated       Minutes: 9 min         METS: 4.3 (submax TM test)            HR: 100-119   RPE: 4-6         Modalities: Treadmill    Exercise workloads will be progressed gradually as tolerated, within limits of patient's ability, and according to the patient's   response to the exercise program.      Aerobic Exercise Prescription Plan for Progression   Frequency: 3 days/week of cardiac rehab       Supplement with home exercise 2+ days/wk as tolerated    Minutes: 40-45       >150 mins/wk of moderate intensity exercise   METS: 2.5-3.5   HR: RHR +30-40bpm     RPE: 4-6   Modalities: Treadmill, UBE, Lifecycle, and NuStep    Strength trainin-3 days / week  12-15 repetitions  1-2 sets per modality   Will be added following at least 8 weeks post surgery and 8-10 monitored sessions   Modalities: Leg Press, Chest Press, Pull Downs, Arm Extension, and Arm Curl    Home Exercise: none    Exercise Education: benefit of exercise for CAD risk factors, home exercise guidelines, AHA guidelines to achieve >150 mins/wk of moderate exercise, and RPE scale     Readiness to change: Preparation:  (Getting ready to change)       NUTRITION GOALS AND PLAN      Nutritional   Reviewed patient's Rate your Plate. Discussed key elements of heart healthy eating. Reviewed patient goals for dietary modifications and their clinical implications.  Reviewed most recent lipid profile.     Patient's progress toward Nutrition goals:    Reviewed Pt goals and determined plan of care, decrease bread consumption       Nutrition Intervention/plan:   group class: Reading Food Labels, group Class: Heart Healthy Eating, increase daily intake of fruits and vegetables, choose lean red meat, never/rarely add  salt to food when cooking or at the table, choose low sodium canned, frozen, packaged foods or rarely eat these foods, and rarely/never eat salty snacks    Measurable goals were based Rate Your Plate Dietary Self-Assessment. These are the areas in which the patient could score higher on the assessment.  Goals include recommendations for a heart healthy diet based on American Heart Association.    Nutrition Education:   heart healthy eating principles  weight loss and management strategies  low sodium diet  maintaining hydration  nutrition for  lipid management  healthy choices while dining out    Readiness to change: Preparation:  (Getting ready to change)       PSYCHOSOCIAL GOALS AND PLAN    Psychosocial Assessment as it relates to rehabilitation:   Patient denies issues with his/her family or home life that may affect their rehabilitation efforts.     Patient's progress toward Psychosocial goals:    Reviewed Pt goals and determined plan of care    Psychosocial Intervention/plan:   Class: Stress and Your Health, Class: Relaxation, Exercise, Learn how to relax and slow down, Join a support group , Meet new people, and Enjoy family    Psychosocial Education: signs/sxs of depression, benefits of a positive support system, stress management techniques, and benefits of enrolling in [employer]    Information to utilize [employer] was provided as well as contact information for counseling through  Behavioral Health and group psychotherapy groups available.    Readiness to change: Preparation:  (Getting ready to change)       OTHER CORE COMPONENTS GOALS and PLAN      Blood Pressure will be monitored throughout the program and cardiologist will be notified of elevated trends.    Pt will be encouraged to monitor home BP if advised by cardiologist.    Tobacco Intervention/plan:   N/A:  Pt is a non-smoker    Progress toward Core Component goals:   Reviewed Pt goals and determined plan of care    Other Core Components  Intervention:   group class: Understanding Heart Disease, group class: Common Heart Medications, medication compliance, eliminate salt shaker at the table, and use salt substitutes    Group and Individual Education:  understanding high blood pressure and it's relationship to CAD, components of blood pressure management, low sodium diet and heart failure, and proper use of sublingual NTG    Readiness to change: Preparation:  (Getting ready to change)

## 2024-09-23 ENCOUNTER — CLINICAL SUPPORT (OUTPATIENT)
Facility: HOSPITAL | Age: 54
End: 2024-09-23
Attending: INTERNAL MEDICINE
Payer: COMMERCIAL

## 2024-09-23 DIAGNOSIS — Z95.1 S/P CABG X 3: Primary | ICD-10-CM

## 2024-09-23 PROCEDURE — 93798 PHYS/QHP OP CAR RHAB W/ECG: CPT

## 2024-09-23 NOTE — PROGRESS NOTES
"[facility]  Physical Therapy Initial Evaluation    Name: Sagrario Freire  Clinic Number: [id number]    Therapy Diagnosis:   Encounter Diagnoses   Name Primary?    Bilateral chronic knee pain     Difficulty walking     Weakness     Range of motion deficit      Physician: Kendrick Freire, *    Physician Orders: PT Eval and Treat   Medical Diagnosis from Referral: M17.0 (ICD-10-CM) - Primary osteoarthritis of knees, bilateral  Evaluation Date: 1/15/2019  Authorization Period Expiration: 1/3/2020  Plan of Care Expiration: 2/26/2019  Visit # / Visits authorized: 1/ 1    Time In: 10:10  Time Out: 11:05  Total Billable Time: 55 minutes    Precautions: Diabetes    Subjective   Date of onset: long history of knee pain, she notes last 2 years have been "the worst"     History of current condition - Sagrario reports: her L knee is hurting more than the right. She can no longer walk around [employer] or [employer] due to pain; she had been using a motorized cart until she had CSI at her MD appointment. She denies specific JUNIOR for knee pain. She denies history of knee surgery.   She states she also has leg cramps, they usually bother her at night. She currently has pain along (L) hamstring. She reports history of previous incidences of her back "locking up" but her back usually doesn't hurt her. She states her HS pain feels like a muscle.   Pt states she usually drinks 16 oz water/day.        Past Medical History:   Diagnosis Date    Diabetes     Hypercholesteremia     Hypertension     Hypothyroidism     Kidney stones      Sgarario Freire  has a past surgical history that includes Cholecystectomy; Colectomy; Belt abdominoplasty; and Abdominal hernia repair.    Sagrario has a current medication list which includes the following prescription(s): aspirin, atorvastatin, cinnamon bark, dulaglutide, famotidine, glimepiride, ibuprofen, lactobacillus rhamnosus gg, levothyroxine, lisinopril, " EXERCISE SESSION DETAIL   "losartan, metformin, polyethylene glycol, pravastatin, and saxagliptin-metformin.    Review of patient's allergies indicates:  No Known Allergies     Imaging, X-ray: bilateral knee  FINDINGS:  Right: There is moderate DJD and a mild varus deformity.  Left: There is moderate DJD and a mild varus deformity.    Prior Therapy: None  Social History: She lives with their spouse in a one-story home.   Occupation: Retired  Prior Level of Function: Independent with pain.   Current Level of Function: Prior to injection, significant limitation due to knee pain and "thaddeus horses."    Pain:  Current 2/10, worst 10/10 (prior to injection), best 0/10 (at rest, after injection).  Location: bilateral knee   Description: Aching and Swollen  Aggravating Factors: Prolonged walking; Sitting with bent knees  Easing Factors: rest    Pts goals: take care of the pain and the "thaddeus horses"     Objective     Observation: Pt is stated age, pleasant, no acute distress, oriented x3.     Gait: Mild Trendelenberg (B); Varus deformity (B) knee.     Range of Motion: AROM:    Knee Left Right   Extension  0 degrees  0 degrees   Flexion  135 degrees  140 degrees         Strength:  Hip Left Right   Flexion 4-/5 4-/5   Abduction 3+/5 4-/5   External Rot 4/5 4/5   Internal Rot 4/5 4/5     Knee Left Right   Extension 4/5 4+/5   Flexion 4+/5 4+/5         Special Tests:  Hip Left Right   FADIR Positive Negative   Scour Positive Negative     Joint Mobility: patellar mobility WNL with crepitus (B).     Palpation: Severe restriction of (L) infrapatellar fat pad and (L) HS musculature     Flexibility:   HS 90/90: 40 deg limited on (L); normal on (R)     Lumbar Screening:   - reflexes: 2+ L3 and S1 (B)  ROM:   - flexion: 100%  - extension: 75% (decreased anterior pelvic translation)  - R rotation: 90%  - L rotation: 75%  Palpation:   - moderate restriction + TTP over multifidus of L3:5 on (L).       CMS Impairment/Limitation/Restriction for FOTO Knee " Survey    Therapist reviewed FOTO scores for Sagrario Freire on 1/15/2019.   FOTO documents entered into [employer] - see Media section.    Limitation Score: 68%  Category: Mobility    Current : CL = least 60% but < 80% impaired, limited or restricted  Goal: CK = at least 40% but < 60% impaired, limited or restricted       TREATMENT   Treatment Time In: 10:45  Treatment Time Out: 11:00  Total Treatment time separate from Evaluation: 15 minutes    Sagrario received therapeutic exercises to develop strength for 15 minutes including:  - SLR + quad set 3x10 (HEP)      Home Exercises and Patient Education Provided    Education provided:   - Findings of evaluation, prognosis, PT plan of care, HEP      Written Home Exercises Provided: yes.  Exercises were reviewed and Sagrario was able to demonstrate them prior to the end of the session.  Sagrario demonstrated good  understanding of the education provided.     See EMR under Media for exercises provided 1/15/2019.    Assessment   Sagrario is a 67 y.o. female referred to outpatient Physical Therapy with a medical diagnosis of M17.0 (ICD-10-CM) - Primary osteoarthritis of knees, bilateral. Pt presents with gait abnormalities, ROM restrictions, significant strength deficits of hip and knee musculature, and flexibility loss contributing to decreased functional mobility tolerance and increased pain.     Pt prognosis is Good.   Pt will benefit from skilled outpatient Physical Therapy to address the deficits stated above and in the chart below, provide pt/family education, and to maximize pt's level of independence.     Plan of care discussed with patient: Yes  Pt's spiritual, cultural and educational needs considered and patient is agreeable to the plan of care and goals as stated below:     Anticipated Barriers for therapy: None    Medical Necessity is demonstrated by the following  History  Co-morbidities and personal factors that may impact the plan of care Co-morbidities:    diabetes    Personal Factors:   no deficits     low   Examination  Body Structures and Functions, activity limitations and participation restrictions that may impact the plan of care Body Regions:   back  lower extremities    Body Systems:    ROM  strength  gait  motor control    Participation Restrictions:   Walking, sitting    Activity limitations:   Learning and applying knowledge  no deficits    General Tasks and Commands  no deficits    Communication  no deficits    Mobility  walking    Self care  no deficits    Domestic Life  shopping  cooking  doing house work (cleaning house, washing dishes, laundry)    Interactions/Relationships  no deficits    Life Areas  no deficits    Community and Social Life  community life  recreation and leisure         moderate   Clinical Presentation stable and uncomplicated low   Decision Making/ Complexity Score: low     Goals:  Short Term Goals: 2 weeks   - Pt will demonstrate excellent knowledge and adherence to HEP to facilitate optimal recovery.    Long Term Goals: 6 weeks   - Pt will demonstrate hip muscle MMT of 4+/5 or greater to increased stability needed for functional activity.   - Pt will demonstrate knee muscle MMT of 5/5 or greater to increased stability needed for functional activity.   - Pt will demonstrate HS 90/90 on LLE of 20 deg lacking or less for increased mobility tolerance.   - Pt will report FOTO score of 50% limited or less indicating clinically relevant improvement in functional tolerance.     Plan   Plan of care Certification: 1/15/2019 to 2/26/2019.    Outpatient Physical Therapy 1-2 times weekly for 6 weeks to include the following interventions: Gait Training, Manual Therapy, Moist Heat/ Ice, Neuromuscular Re-ed, Patient Education, Therapeutic Activites and Therapeutic Exercise.     Estela Moreno, PT, DPT

## 2024-09-25 ENCOUNTER — CLINICAL SUPPORT (OUTPATIENT)
Facility: HOSPITAL | Age: 54
End: 2024-09-25
Attending: INTERNAL MEDICINE
Payer: COMMERCIAL

## 2024-09-25 DIAGNOSIS — Z95.1 S/P CABG X 3: Primary | ICD-10-CM

## 2024-09-25 PROCEDURE — 93798 PHYS/QHP OP CAR RHAB W/ECG: CPT

## 2024-09-27 ENCOUNTER — CLINICAL SUPPORT (OUTPATIENT)
Facility: HOSPITAL | Age: 54
End: 2024-09-27
Attending: INTERNAL MEDICINE
Payer: COMMERCIAL

## 2024-09-27 DIAGNOSIS — Z95.1 S/P CABG X 3: Primary | ICD-10-CM

## 2024-09-27 PROCEDURE — 93798 PHYS/QHP OP CAR RHAB W/ECG: CPT

## 2024-09-30 ENCOUNTER — CLINICAL SUPPORT (OUTPATIENT)
Facility: HOSPITAL | Age: 54
End: 2024-09-30
Attending: INTERNAL MEDICINE
Payer: COMMERCIAL

## 2024-09-30 DIAGNOSIS — Z95.1 S/P CABG X 3: Primary | ICD-10-CM

## 2024-09-30 PROCEDURE — 93798 PHYS/QHP OP CAR RHAB W/ECG: CPT

## 2024-10-02 ENCOUNTER — CLINICAL SUPPORT (OUTPATIENT)
Facility: HOSPITAL | Age: 54
End: 2024-10-02
Attending: INTERNAL MEDICINE
Payer: COMMERCIAL

## 2024-10-02 DIAGNOSIS — Z95.1 S/P CABG X 3: Primary | ICD-10-CM

## 2024-10-02 PROCEDURE — 93798 PHYS/QHP OP CAR RHAB W/ECG: CPT

## 2024-10-04 ENCOUNTER — CLINICAL SUPPORT (OUTPATIENT)
Facility: HOSPITAL | Age: 54
End: 2024-10-04
Attending: INTERNAL MEDICINE
Payer: COMMERCIAL

## 2024-10-04 DIAGNOSIS — Z95.1 S/P CABG X 3: Primary | ICD-10-CM

## 2024-10-04 PROCEDURE — 93798 PHYS/QHP OP CAR RHAB W/ECG: CPT

## 2024-10-07 ENCOUNTER — CLINICAL SUPPORT (OUTPATIENT)
Facility: HOSPITAL | Age: 54
End: 2024-10-07
Attending: INTERNAL MEDICINE
Payer: COMMERCIAL

## 2024-10-07 DIAGNOSIS — Z95.1 S/P CABG X 3: Primary | ICD-10-CM

## 2024-10-07 PROCEDURE — 93798 PHYS/QHP OP CAR RHAB W/ECG: CPT

## 2024-10-09 ENCOUNTER — OFFICE VISIT (OUTPATIENT)
Age: 54
End: 2024-10-09
Payer: COMMERCIAL

## 2024-10-09 ENCOUNTER — CLINICAL SUPPORT (OUTPATIENT)
Facility: HOSPITAL | Age: 54
End: 2024-10-09
Attending: INTERNAL MEDICINE
Payer: COMMERCIAL

## 2024-10-09 VITALS
BODY MASS INDEX: 34.96 KG/M2 | WEIGHT: 263.8 LBS | DIASTOLIC BLOOD PRESSURE: 70 MMHG | OXYGEN SATURATION: 98 % | SYSTOLIC BLOOD PRESSURE: 106 MMHG | HEART RATE: 91 BPM | HEIGHT: 73 IN

## 2024-10-09 DIAGNOSIS — I25.10 CAD IN NATIVE ARTERY: ICD-10-CM

## 2024-10-09 DIAGNOSIS — E78.2 MIXED HYPERLIPIDEMIA: ICD-10-CM

## 2024-10-09 DIAGNOSIS — I50.22 CHRONIC SYSTOLIC HEART FAILURE (HCC): ICD-10-CM

## 2024-10-09 DIAGNOSIS — I25.5 ISCHEMIC CARDIOMYOPATHY: Primary | ICD-10-CM

## 2024-10-09 DIAGNOSIS — Z95.1 S/P CABG X 3: Primary | ICD-10-CM

## 2024-10-09 DIAGNOSIS — I10 PRIMARY HYPERTENSION: ICD-10-CM

## 2024-10-09 DIAGNOSIS — I51.9 RADIATION-INDUCED HEART DISEASE: ICD-10-CM

## 2024-10-09 DIAGNOSIS — I50.20 HFREF (HEART FAILURE WITH REDUCED EJECTION FRACTION) (HCC): ICD-10-CM

## 2024-10-09 DIAGNOSIS — I31.39 PERICARDIAL EFFUSION: ICD-10-CM

## 2024-10-09 PROCEDURE — 93798 PHYS/QHP OP CAR RHAB W/ECG: CPT

## 2024-10-09 PROCEDURE — 99214 OFFICE O/P EST MOD 30 MIN: CPT | Performed by: INTERNAL MEDICINE

## 2024-10-09 RX ORDER — ATORVASTATIN CALCIUM 80 MG/1
80 TABLET, FILM COATED ORAL DAILY
Qty: 30 TABLET | Refills: 11 | Status: SHIPPED | OUTPATIENT
Start: 2024-10-09

## 2024-10-09 RX ORDER — SACUBITRIL AND VALSARTAN 24; 26 MG/1; MG/1
1 TABLET, FILM COATED ORAL 2 TIMES DAILY
Qty: 60 TABLET | Refills: 11 | Status: SHIPPED | OUTPATIENT
Start: 2024-10-09

## 2024-10-09 NOTE — PATIENT INSTRUCTIONS
I will refill atorvastatin at the higher dose that you used to be on, 80 mg    Restart Entresto at the lowest dose    Blood work about 1 week after starting Entresto

## 2024-10-09 NOTE — PROGRESS NOTES
Cardio-Oncology / Heart Failure Cardiology Clinic Note    Marcelina Ascencio 54 y.o. male   MRN: [id number]  Encounter: [id number]        Assessment / Plan:    # Cardio-Oncology Pertinent History  Hodgkin's lymphoma  Dx age 19  Initial treatment included - ABVD.     Relapse 1 year later, had MOPP and chest radiation.  Hx of bone marrow transplant 1991    # ischemic cardiomyopathy  # HFrEF - chronic    ETIOLOGY:  - new dx 6/2023.  EF 43% at that time.  - [facility] - multivessel CAD  - suspect 2/2 CAD +/- prior anthracycline therapy    VOLUME:  - not requiring diuretic  - Exam - euvolemic  - baseline BNP - 73    GDMT:  - toprol 25 daily   - entresto  (held post CABG - restart)    - spironlactone caused hyperkalemia and was DC'd  - dapagliflozin 10mg daily  - CT surgery added dilt - eventually should stop this (stop 1 year post CABG)    DEVICE:  - has RBBB  - EF 45%    # CAD s/p CABG  New dx on [facility] May 2024.  S/p CABG x 3 in Aug 2024.  LIMA-LAD, YBO-bddmza-[id number], radial-OM.  Also had PERLA clip.   ASA, plavix  (CT surgery wants DAPT for 1 year)  Statin  Diltiazem x 1 year to prevent radial artery spasm    # small-moderate pericardial effusion  Seen on 2023 and 2024 echo.  ? Due to prior radiation  No evidence of tamponade  Will need serial f/u of this going forward  (improved on post CABG echo)    # Radiation heart valve disease  Echo shows significant MAC.  The aortic valve is calcified and there is mild to moderate AI and mild-moderate AS.  Will need serial follow-up of valve disease as it is likely to progress    # HTN  See GDMT above.      # HLD  On lipitor       Today's Plan Summary:  See above assessment/plan for full details of today's plan.  Briefly,     Restart Entresto at lowest dose  BMP 1 week              Reason For Visit / Chief Complaint:  F/u -  CAD, cardiomyopathy, and history of cancer therapies for lymphoma    HPI:   Marcelina Ascencio is a 54 y.o.  male with history as noted in the problem list and further detailed in the  above assessment and plan.    Initial:   April 2024  Referred by Dr. Butler (general cardiology) for a new patient visit for dilated cardiomyopathy and history of cancer therapies for lymphoma  As above, the patient has a distant history of lymphoma.  Treatment included anthracycline based chemotherapy and radiation.  He also had a bone marrow transplant.  In June 2023 he had some shortness of breath for which an echo was obtained and demonstrating an EF of 43%.  This was a new diagnosis.  He was referred to cardiology and started on beta-blocker/ACE.  A repeat echo in January 2024 reported an EF down to 35% (although the biplane measurement was 43%).  Given his history of anthracycline and radiation he was referred to cardio-oncology/heart failure clinic.  Today, the patient reports -  no chest pain.  Does get BARRERA.   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   .  Had a daughter.  Smokes cigars.      Interval:  Last visit -->   had CABG.  Still sternal pain.     Plan last visit -->     BP soft today.  Stay hydrated.   Montior BP at home.  Monitor chest tube incision site - call surgery if drainage /pus develops    started cardiac rehab    Today -  feeling well.   No chest pain.  Just sternal pain.   No SOB.  No significant edema.  No syncope.           Cardiac Imaging personally reviewed:  EKG 7-5-23  Sinus rhythm with first-degree AV block  Right bundle branch block    4-23-24  Sinus rhythm with first-degree AV block  Right bundle branch block       Holter or event monitor    Echo 6-6-23  EF 43%  Small pericardial effusion    1-16-24  LVIDd 5.5cm.   EF 35% per report (43% by biplane)  Normal RV size and function  Mild AS.  Mild-mod AI  Small-moderate pericardial effusion, no tamponade    5-7-24  EF 40%.  Mild AS.  Mild-mod AI  small-moderate pericardial effusion, no tamponade    8-7-24  (OSH - post CABG)  EF 45%  Moderate aortic stenosis         SOLANGE    Cardiac MRI    Stress testing    Coronary  CTA or [facility] - 05/07/24   ·  Ost LM lesion is 60% stenosed.  ·  Mid-distal LM lesion is 70% stenosed.  ·  Ost LAD lesion is 80% stenosed.  ·  Ost Cx to Prox Cx lesion is 85% stenosed.  ·  Mid Cx lesion is 60% stenosed.  ·  Dist RCA lesion is 70% stenosed.       C    CPET              Patient Active Problem List    Diagnosis Date Noted    RBBB 05/13/2024    CAD, multiple vessel 05/13/2024    Class 2 obesity 05/13/2024    1st degree AV block 05/13/2024    Left main coronary artery disease 05/13/2024    Chronic systolic heart failure (HCC) 04/23/2024    Pericardial effusion 04/23/2024    Radiation-induced heart disease 04/23/2024    Primary hypertension 04/23/2024    Mixed hyperlipidemia 04/23/2024    Cardiomyopathy (HCC) 07/05/2023    Family history of ischemic bowel disease 07/05/2023    Plantar fasciitis 08/28/2018    Mononeuropathy 11/04/2016    Hypercholesterolemia 04/09/2014       Past Medical History:   Diagnosis Date    1st degree AV block 05/13/2024    CAD, multiple vessel 05/13/2024    Cardiomyopathy (HCC)     Chronic systolic heart failure (HCC) 04/23/2024    Class 2 obesity 05/13/2024    Hodgkin's lymphoma (HCC)     Hypercholesterolemia     Hyperlipidemia     Nephrolithiasis     Pericardial effusion 04/23/2024    Plantar fasciitis 08/28/2018    Pre-diabetes     Primary hypertension 04/23/2024    Radiation-induced heart disease 04/23/2024    RBBB 05/13/2024       Allergies   Allergen Reactions    Pollen Extract Eye Swelling     Reports allergy to pollens during the summer       Current Outpatient Medications   Medication Instructions    aspirin 81 mg, Oral, Daily    atorvastatin (LIPITOR) 80 mg, Oral, Daily    clopidogrel (PLAVIX) 75 mg tablet 1 tablet, Oral, Daily    dapagliflozin (FARXIGA) 10 mg, Oral, Daily    diltiazem (CARDIZEM CD) 120 mg, Oral, Daily    metoprolol succinate (TOPROL-XL) 50 mg 24 hr tablet TAKE 1.5 TABLETS BY MOUTH 2 TIMES A DAY.    nitroglycerin (NITROSTAT) 0.4 mg, Sublingual, Every  "5 minutes PRN       Social History     Socioeconomic History    Marital status: /Civil Union     Spouse name: Not on file    Number of children: Not on file    Years of education: Not on file    Highest education level: Not on file   Occupational History    Not on file   Tobacco Use    Smoking status: Light Smoker     Types: Cigars    Smokeless tobacco: Never    Tobacco comments:     Patient smokes cigars 3-4 times a week   Vaping Use    Vaping status: Never Used   Substance and Sexual Activity    Alcohol use: Yes     Comment: social    Drug use: No    Sexual activity: Not on file   Other Topics Concern    Not on file   Social History Narrative    Not on file     Social Determinants of Health     Financial Resource Strain: Not on file   Food Insecurity: Not on file   Transportation Needs: Not on file   Physical Activity: Inactive (6/22/2019)    Exercise Vital Sign     Days of Exercise per Week: 0 days     Minutes of Exercise per Session: 0 min   Stress: No Stress Concern Present (5/1/2023)    Citizen of [place] [employer] - Occupational Stress Questionnaire     Feeling of Stress : Not at all   Social Connections: Unknown (6/18/2024)    Received from [facility]    Social [employer]     How often do you feel lonely or isolated from those around you? (Adult - for ages 18 years and over): Not on file   Intimate Partner Violence: Not on file   Housing Stability: Not on file       Family History   Problem Relation Age of Onset    Hypertension Mother     Heart attack Father         age 59.  was a heavy drinker.       Physical Exam:  Blood pressure 106/70, pulse 91, height 6' 1\" (1.854 m), weight 120 kg (263 lb 12.8 oz), SpO2 98%.  Body mass index is 34.8 kg/m².  Wt Readings from Last 3 Encounters:   10/09/24 120 kg (263 lb 12.8 oz)   08/27/24 120 kg (264 lb)   07/23/24 125 kg (276 lb 8 oz)     Physical Exam  Vitals reviewed.   Constitutional:       General: He is not in acute distress.     Appearance: He " "is not toxic-appearing.   Neck:      Comments: JVP is not elevated  Cardiovascular:      Rate and Rhythm: Normal rate and regular rhythm.      Heart sounds: Murmur (systolic at base) heard.      No friction rub. No gallop.   Pulmonary:      Breath sounds: Normal breath sounds. No wheezing, rhonchi or rales.   Musculoskeletal:      Comments: No edema   Neurological:      Mental Status: He is alert.         Labs & Results:  Lab Results   Component Value Date    SODIUM 139 05/07/2024    K 4.6 05/07/2024     05/07/2024    CO2 30 05/07/2024    BUN 17 05/07/2024    CREATININE 1.06 05/07/2024    GLUC 126 05/07/2024    CALCIUM 9.6 05/07/2024     No results found for: \"NTBNP\"       Thank you for the opportunity to participate in the care of this patient.    Mayco Nayak MD, [facility]  Staff Cardiologist  Director of Cardio-Oncology  [facility]  "

## 2024-10-11 ENCOUNTER — APPOINTMENT (OUTPATIENT)
Facility: HOSPITAL | Age: 54
End: 2024-10-11
Attending: INTERNAL MEDICINE
Payer: COMMERCIAL

## 2024-10-14 ENCOUNTER — CLINICAL SUPPORT (OUTPATIENT)
Facility: HOSPITAL | Age: 54
End: 2024-10-14
Attending: INTERNAL MEDICINE
Payer: COMMERCIAL

## 2024-10-14 DIAGNOSIS — Z95.1 S/P CABG X 3: Primary | ICD-10-CM

## 2024-10-14 PROCEDURE — 93798 PHYS/QHP OP CAR RHAB W/ECG: CPT

## 2024-10-16 ENCOUNTER — CLINICAL SUPPORT (OUTPATIENT)
Facility: HOSPITAL | Age: 54
End: 2024-10-16
Attending: INTERNAL MEDICINE
Payer: COMMERCIAL

## 2024-10-16 DIAGNOSIS — Z95.1 S/P CABG X 3: Primary | ICD-10-CM

## 2024-10-16 PROCEDURE — 93798 PHYS/QHP OP CAR RHAB W/ECG: CPT

## 2024-10-18 ENCOUNTER — CLINICAL SUPPORT (OUTPATIENT)
Facility: HOSPITAL | Age: 54
End: 2024-10-18
Attending: INTERNAL MEDICINE
Payer: COMMERCIAL

## 2024-10-18 DIAGNOSIS — Z95.1 S/P CABG X 3: Primary | ICD-10-CM

## 2024-10-18 PROCEDURE — 93798 PHYS/QHP OP CAR RHAB W/ECG: CPT

## 2024-10-21 ENCOUNTER — TELEPHONE (OUTPATIENT)
Age: 54
End: 2024-10-21

## 2024-10-21 ENCOUNTER — CLINICAL SUPPORT (OUTPATIENT)
Facility: HOSPITAL | Age: 54
End: 2024-10-21
Attending: INTERNAL MEDICINE
Payer: COMMERCIAL

## 2024-10-21 ENCOUNTER — LAB (OUTPATIENT)
Dept: LAB | Facility: HOSPITAL | Age: 54
End: 2024-10-21
Payer: COMMERCIAL

## 2024-10-21 DIAGNOSIS — I50.20 HFREF (HEART FAILURE WITH REDUCED EJECTION FRACTION) (HCC): ICD-10-CM

## 2024-10-21 DIAGNOSIS — Z95.1 S/P CABG X 3: Primary | ICD-10-CM

## 2024-10-21 DIAGNOSIS — E87.5 HYPERKALEMIA: Primary | ICD-10-CM

## 2024-10-21 DIAGNOSIS — I50.22 CHRONIC SYSTOLIC HEART FAILURE (HCC): ICD-10-CM

## 2024-10-21 DIAGNOSIS — I25.5 ISCHEMIC CARDIOMYOPATHY: ICD-10-CM

## 2024-10-21 LAB
ANION GAP SERPL CALCULATED.3IONS-SCNC: 6 MMOL/L (ref 4–13)
BUN SERPL-MCNC: 29 MG/DL (ref 5–25)
CALCIUM SERPL-MCNC: 9.9 MG/DL (ref 8.4–10.2)
CHLORIDE SERPL-SCNC: 105 MMOL/L (ref 96–108)
CO2 SERPL-SCNC: 27 MMOL/L (ref 21–32)
CREAT SERPL-MCNC: 1.32 MG/DL (ref 0.6–1.3)
GFR SERPL CREATININE-BSD FRML MDRD: 61 ML/MIN/1.73SQ M
GLUCOSE SERPL-MCNC: 90 MG/DL (ref 65–140)
POTASSIUM SERPL-SCNC: 5.4 MMOL/L (ref 3.5–5.3)
SODIUM SERPL-SCNC: 138 MMOL/L (ref 135–147)

## 2024-10-21 PROCEDURE — 93798 PHYS/QHP OP CAR RHAB W/ECG: CPT

## 2024-10-21 PROCEDURE — 36415 COLL VENOUS BLD VENIPUNCTURE: CPT

## 2024-10-21 PROCEDURE — 80048 BASIC METABOLIC PNL TOTAL CA: CPT

## 2024-10-21 NOTE — TELEPHONE ENCOUNTER
LVOM regarding message below. Pt advised to follow low potassium diet and avoid high potassium foods, examples given, also to stay hydrated. Pt advised to repeat blood work in 1-2 weeks. Instructed patient to call my direct TEAMS number [phone] with any questions or concerns.

## 2024-10-21 NOTE — TELEPHONE ENCOUNTER
----- Message from Mayco Nayak MD sent at 10/21/2024  1:06 PM EDT -----    BMP -   K 5.4.   Cr 1.3.    [place] - Can you let the patient know -potassium is elevated.  There is also a slight increase in BUN and creatinine.  I would recommend:    Increasing hydration, low potassium diet, and repeating a BMP in 1 to 2 weeks.  (I placed order for another BMP).

## 2024-10-21 NOTE — RESULT ENCOUNTER NOTE
BMP -   K 5.4.   Cr 1.3.    [place] - Can you let the patient know -potassium is elevated.  There is also a slight increase in BUN and creatinine.  I would recommend:    Increasing hydration, low potassium diet, and repeating a BMP in 1 to 2 weeks.  (I placed order for another BMP).

## 2024-10-23 ENCOUNTER — CLINICAL SUPPORT (OUTPATIENT)
Facility: HOSPITAL | Age: 54
End: 2024-10-23
Attending: INTERNAL MEDICINE
Payer: COMMERCIAL

## 2024-10-23 DIAGNOSIS — Z95.1 S/P CABG X 3: Primary | ICD-10-CM

## 2024-10-23 PROCEDURE — 93798 PHYS/QHP OP CAR RHAB W/ECG: CPT

## 2024-10-25 ENCOUNTER — CLINICAL SUPPORT (OUTPATIENT)
Facility: HOSPITAL | Age: 54
End: 2024-10-25
Attending: INTERNAL MEDICINE
Payer: COMMERCIAL

## 2024-10-25 DIAGNOSIS — Z95.1 S/P CABG X 3: Primary | ICD-10-CM

## 2024-10-25 PROCEDURE — 93798 PHYS/QHP OP CAR RHAB W/ECG: CPT

## 2024-10-28 ENCOUNTER — CLINICAL SUPPORT (OUTPATIENT)
Facility: HOSPITAL | Age: 54
End: 2024-10-28
Attending: INTERNAL MEDICINE
Payer: COMMERCIAL

## 2024-10-28 DIAGNOSIS — Z95.1 S/P CABG X 3: Primary | ICD-10-CM

## 2024-10-28 PROCEDURE — 93798 PHYS/QHP OP CAR RHAB W/ECG: CPT

## 2024-10-30 ENCOUNTER — APPOINTMENT (OUTPATIENT)
Facility: HOSPITAL | Age: 54
End: 2024-10-30
Attending: INTERNAL MEDICINE
Payer: COMMERCIAL

## 2024-11-01 ENCOUNTER — CLINICAL SUPPORT (OUTPATIENT)
Facility: HOSPITAL | Age: 54
End: 2024-11-01
Attending: INTERNAL MEDICINE
Payer: COMMERCIAL

## 2024-11-01 DIAGNOSIS — Z95.1 S/P CABG X 3: Primary | ICD-10-CM

## 2024-11-01 PROCEDURE — 93798 PHYS/QHP OP CAR RHAB W/ECG: CPT

## 2024-11-01 NOTE — PROGRESS NOTES
CARDIAC REHABILITATION   ASSESSMENT AND INDIVIDUALIZED TREATMENT PLAN   DAY          Today's date: 2024   # of Exercise Sessions Completed: 17  Patient name: Marcelina Ascencio      : 1970  Age: 54 y.o.       MRN: [id number]  Referring Physician: Mayco Nayak MD  Cardiologist: Mayco Nayak MD  Provider: Alexei  Clinician: Anne Bradford, MS         Treatment is tailored to this patient's individual needs.  The ITP was reviewed with the patient and all questions were answered to their satisfaction.  Additional ITP documentation can be found electronically including daily and monthly exercise summaries, daily session notes with ECG summaries, education notes, daily medication reconciliation, and daily physician supervision.      Comments:  2024: Marcelina has attended 17 sessions of CR. He works at 3.2-5.4 METs on different exercise modalities. Marcelina will be returning to work Monday with lighter duties, he is very excited to get back to work. He will be transferring to [place] to complete CR, due to work being closer to that campus. In the next month, he would like to start going to the gym with his wife, continue his mediterranean diet, cut back on bread and increase overall daily hydration.         Dx:   Encounter Diagnosis   Name Primary?    S/P CABG x 3 Yes         Description of Diagnosis: s/p CABG x3 (LIMA-LAD, CDH-dcaamv-zVAI, radial-OM), PERLA clip   Date of onset: 2024  Other Cardiac History: cardiomyopathy,chronic systolic heart failure,pradiation induced heart disease,1AVB,RBBB,HLD,HTN        ASSESSMENT    Medical History:   Past Medical History:   Diagnosis Date    1st degree AV block 2024    CAD, multiple vessel 2024    Cardiomyopathy (HCC)     Chronic systolic heart failure (HCC) 2024    Class 2 obesity 2024    Hodgkin's lymphoma (HCC)     Hypercholesterolemia     Hyperlipidemia     Nephrolithiasis     Pericardial effusion 2024    Plantar fasciitis  08/28/2018    Pre-diabetes     Primary hypertension 04/23/2024    Radiation-induced heart disease 04/23/2024    RBBB 05/13/2024       Family History:  Family History   Problem Relation Age of Onset    Hypertension Mother     Heart attack Father         age 59.  was a heavy drinker.       Allergies:   Pollen extract    Current Medications:   Current Outpatient Medications   Medication Sig Dispense Refill    aspirin 81 mg chewable tablet Chew 1 tablet (81 mg total) daily 30 tablet 5    atorvastatin (LIPITOR) 80 mg tablet Take 1 tablet (80 mg total) by mouth daily 30 tablet 11    clopidogrel (PLAVIX) 75 mg tablet Take 1 tablet by mouth daily      dapagliflozin (Farxiga) 10 MG tablet Take 1 tablet (10 mg total) by mouth daily 90 tablet 5    diltiazem (CARDIZEM CD) 120 mg 24 hr capsule Take 120 mg by mouth daily      metoprolol succinate (TOPROL-XL) 50 mg 24 hr tablet TAKE 1.5 TABLETS BY MOUTH 2 TIMES A DAY. (Patient taking differently: Take 25 mg by mouth daily) 270 tablet 1    nitroglycerin (NITROSTAT) 0.4 mg SL tablet Place 1 tablet (0.4 mg total) under the tongue every 5 (five) minutes as needed for chest pain (Patient not taking: Reported on 10/9/2024) 25 tablet 5    sacubitril-valsartan (Entresto) 24-26 MG TABS Take 1 tablet by mouth 2 (two) times a day 60 tablet 11     No current facility-administered medications for this visit.       Medication compliance: Yes   Comments: Pt reports to be compliant with medications    Physical Limitations: none     Fall Risk: Moderate   Comments: Ambulates with a steady gait with no assist device and Denies a fall in the past 6 months    Cultural needs: none      CAD Risk Factors:  Cholesterol: Yes  HTN: Yes  DM: No  Obesity: Yes   Inactivity: Yes      EXERCISE ASSESSMENT:     Initial Fitness Assessment: Submaximal TM ETT:  Resting:  BP: 106/66  HR: 87, Exercise:  BP: 108//62  HR: 100-119, and METs:  4.3      ECG INTERPRETATION:  NSR,RBBB,1AVB,isolated PVC    Current  Functional Status:  Occupation: full time job [employer]   Recreation/Physical Activity: fishing   ADL’s:resumed all ADLs within sternal precautions  [place]: resumed all ADLs within sternal precautions  Home exercise: none  Other Comments: none      SMART Exercise Goals:   10% improvement in functional capacity based on max METs achieved in initial fitness assessment  reduced dyspnea with physical activity    improved DASI score by 10%  increased exercise capacity by 40% based on peak METs tolerated in cardiac rehab exercise session  maintain > 150 minutes per week of moderate intensity exercise    Patient Specific EXERCISE GOALS:       Increase strength to carry heavy appliances  Decrease wt by 15 lbs in 12 weeks  Increase stamina and energy     Functional Capacity Screening Tool:  [facility] Activity Status Index:  5.07 METs      PSYCHOSOCIAL ASSESSMENT:    Date of last Assessment:  9/18/2024  Depression screening:  PHQ-9 = 5    Interpretation:  5-9 = Mild Depression  Anxiety screening:  STEPHEN-7 = 1    Interpretation: 0-4  = Not anxious    Pt self-report of depression and anxiety   Patient reports they are coping well with good social support and denies depression or anxiety  Reports sufficient emotional support     Self-reported stress level:  2   Stressors:  pt reports not stress at the moment   Stress Management Tools: TV and music    SMART Psychosocial Goals:     Physical Fitness in [place] Score < 3, Daily Activity in [place] Score < 3, Social Activities in [place] Score < 3, Pain in [place] Score < 3, Overall Health in [place] Score < 3, improved sleeping habits, feel less tired with more energy, Improved appetite control, and take time to relax    Patient Specific PSYCHOCOSOCIAL GOALS:    Reports no psychosocial concerns that will get in the way of rehab  Decrease the thought of a reoccurrence of a cardiac event      Quality of Life Screen:  (Higher score indicates disease impact on  "QOL)  [facility] COOP score: 21/45     Social Support:   spouse and children  Community/Social Activities: none     Psychosocial Assessment as it relates to rehabilitation:   Patient denies issues with his/her family or home life that may affect their rehabilitation efforts.       NUTRITION ASSESSMENT:    Initial Weight:  264.2  Current Weight: 266.4 lbs    Height:   Ht Readings from Last 1 Encounters:   10/09/24 6' 1\" (1.854 m)       Rate Your Plate Score: 68/81    Diabetes: N/A  A1c: 5.8    last measured: 8/4/2024    Lipid management: Discussed diet and lipid management and Last lipid profile 1/2/2024  Chol 174    HDL 53  LDL 91    Current Dietary Habits:  Mediterranean diet   Eats a large amount of bread  SMART Nutrition Goals:   reduced BMI to < 25, TRG <150, decreased body fat% <25%   (M), Wt. loss 1 ppw,  goal of 15 lbs., eat whole grain breads, brown rice and whole grain cereals, eat 5 or more servings of fruits and vegetables a day, and rarely eat processed meats or eat low fat processed meats    Patient Specific NUTRITION GOALS:     1. Decrease the amount of bread consumed    2. Increase water consumption to 64 ounces    3. Pick low sodium lunch meats     Drug/Alcohol Use:   Yes, social drinker       OTHER CORE COMPONENT ASSESSMENT:    Tobacco Use:     N/A:  Patient is a non-smoker     Anginal Symptoms:  chest pressure   NTG use: Compliant with carrying NTG and Understands proper use    SMART Goals:   consistent, controlled resting BP < 130/80, medication compliance, and reduced angina    Patient Specific CORE COMPONENT GOALS:    Carry NTG at all times   Monitor BP at home         INDIVIDUALIZED TREATMENT PLAN      EXERCISE GOALS and PLAN      Progress toward Exercise goals:   Pt is progressing and showing improvement  toward the following goals:  attending CR daily, increase exercise intensities.  , Pt has not made progress toward the following goals: no home exercise. , Will continue to educate and " progress as tolerated.     Exercise Intervention/plan:    education on home exercise guidelines, home exercise 30+ mins 2 days opposite CR, Group class: Risk Factors for Heart Disease, Patient education:   Exercise After Cardiac Rehabilitation, and After discharge patient will continue to follow a regular exercise regimen with the goal of a minimum of 150 minutes per week of moderate intensity exercise.      The patient was counseled on exercise guidelines to achieve a minimum of 150 mins/wk of moderate intensity (RPE 4-6)   exercise and encouraged to add 1-2 days of exercise on opposite days of cardiac rehab as tolerated.       PHYSICIAN PRESCRIBED EXERCISE:    Current Aerobic Exercise Prescription:      Frequency: 1 days/week   Supplement with home exercise 2+ days/wk as tolerated       Minutes: 40        METS: 3.2-5.4           HR: 114-127   RPE: 2-4         Modalities: Treadmill, Lifecycle, rower    Exercise workloads will be progressed gradually as tolerated, within limits of patient's ability, and according to the patient's   response to the exercise program.      Aerobic Exercise Prescription Plan for Progression   Frequency: 3 days/week of cardiac rehab       Supplement with home exercise 2+ days/wk as tolerated    Minutes: 40-45       >150 mins/wk of moderate intensity exercise   METS: 2.5-3.5   HR: RHR +30-40bpm     RPE: 4-6   Modalities: Treadmill, UBE, Lifecycle, and NuStep    Strength trainin-3 days / week  12-15 repetitions  1-2 sets per modality   Will be added following at least 8 weeks post surgery and 8-10 monitored sessions   Modalities: Leg Press, Chest Press, Pull Downs, Arm Extension, and Arm Curl    Home Exercise: none    Exercise Education: benefit of exercise for CAD risk factors, home exercise guidelines, AHA guidelines to achieve >150 mins/wk of moderate exercise, and RPE scale     Readiness to change: Preparation:  (Getting ready to change)  and Action:  (Changing  behavior)      NUTRITION GOALS AND PLAN      Nutritional   Reviewed patient's Rate your Plate. Discussed key elements of heart healthy eating. Reviewed patient goals for dietary modifications and their clinical implications.  Reviewed most recent lipid profile.     Patient's progress toward Nutrition goals:    Pt is progressing and showing improvement  toward the following goals:  making many dietary changes with his Mediterranean diet, chicken, fish, fruits and veggies.  , Will continue to educate and progress as tolerated. He wants to decrease bread intake and increase daily hydration      Nutrition Intervention/plan:   group class: Reading Food Labels, group Class: Heart Healthy Eating, increase daily intake of fruits and vegetables, choose lean red meat, never/rarely add salt to food when cooking or at the table, choose low sodium canned, frozen, packaged foods or rarely eat these foods, and rarely/never eat salty snacks    Measurable goals were based Rate Your Plate Dietary Self-Assessment. These are the areas in which the patient could score higher on the assessment.  Goals include recommendations for a heart healthy diet based on American Heart Association.    Nutrition Education:   heart healthy eating principles  weight loss and management strategies  low sodium diet  maintaining hydration  nutrition for  lipid management  healthy choices while dining out    Readiness to change: Action:  (Changing behavior)      PSYCHOSOCIAL GOALS AND PLAN    Psychosocial Assessment as it relates to rehabilitation:   Patient denies issues with his/her family or home life that may affect their rehabilitation efforts.     Patient's progress toward Psychosocial goals:    Pt is progressing and showing improvement  toward the following goals:  maintaining emotional health with good support at home as needed.  , Will continue to educate and progress as tolerated.    Psychosocial Intervention/plan:   Class: Stress and Your Health,  Class: Relaxation, Exercise, Learn how to relax and slow down, Join a support group , Meet new people, and Enjoy family    Psychosocial Education: signs/sxs of depression, benefits of a positive support system, stress management techniques, and benefits of enrolling in [employer]    Information to utilize [employer] was provided as well as contact information for counseling through  Behavioral Health and group psychotherapy groups available.    Readiness to change: Action:  (Changing behavior)      OTHER CORE COMPONENTS GOALS and PLAN      Blood Pressure will be monitored throughout the program and cardiologist will be notified of elevated trends.    Pt will be encouraged to monitor home BP if advised by cardiologist.    Tobacco Intervention/plan:   N/A:  Pt is a non-smoker    Progress toward Core Component goals:   Pt is progressing and showing improvement  toward the following goals:  watching sodium intake, hydrating, wants to increase daily hydration, occasional hypotensive Bps, staff encourages hydration .  , Will continue to educate and progress as tolerated.    Other Core Components Intervention:   group class: Understanding Heart Disease, group class: Common Heart Medications, medication compliance, eliminate salt shaker at the table, and use salt substitutes    Group and Individual Education:  understanding high blood pressure and it's relationship to CAD, components of blood pressure management, low sodium diet and heart failure, and proper use of sublingual NTG    Readiness to change: Action:  (Changing behavior)

## 2024-11-04 ENCOUNTER — APPOINTMENT (OUTPATIENT)
Facility: HOSPITAL | Age: 54
End: 2024-11-04
Attending: INTERNAL MEDICINE
Payer: COMMERCIAL

## 2024-11-04 ENCOUNTER — TELEPHONE (OUTPATIENT)
Dept: CARDIOLOGY CLINIC | Facility: CLINIC | Age: 54
End: 2024-11-04

## 2024-11-04 DIAGNOSIS — I25.10 CAD, MULTIPLE VESSEL: Primary | ICD-10-CM

## 2024-11-04 DIAGNOSIS — E78.2 MIXED HYPERLIPIDEMIA: ICD-10-CM

## 2024-11-04 RX ORDER — DILTIAZEM HYDROCHLORIDE 120 MG/1
120 CAPSULE, COATED, EXTENDED RELEASE ORAL DAILY
Qty: 30 CAPSULE | Refills: 5 | Status: SHIPPED | OUTPATIENT
Start: 2024-11-04

## 2024-11-04 NOTE — TELEPHONE ENCOUNTER
Pt on diltiazem to prevent radial artery spasm post CABG. Originally ordered by CT surgery. Pt will stop med @ 1 yr post CABG.

## 2024-11-04 NOTE — TELEPHONE ENCOUNTER
Patient called the RX Refill Line. Message is being forwarded to the office.     Patient called because he is unsure if is is to continue taking diltiazem (CARDIZEM CD) 120 mg 24 hr capsule. He thinks Mayco Nayak MD may have told him he was to stop taking the medication. Please review and contact the patient. If he is to continue he needs a refill sent to [facility]/pharmacy #[id number] - [place], [place] - [id number]     Please contact patient at [phone]

## 2024-11-05 ENCOUNTER — CLINICAL SUPPORT (OUTPATIENT)
Dept: CARDIAC REHAB | Facility: CLINIC | Age: 54
End: 2024-11-05
Payer: COMMERCIAL

## 2024-11-05 DIAGNOSIS — Z95.1 S/P CABG X 3: Primary | ICD-10-CM

## 2024-11-05 PROCEDURE — 93798 PHYS/QHP OP CAR RHAB W/ECG: CPT

## 2024-11-05 RX ORDER — ATORVASTATIN CALCIUM 80 MG/1
80 TABLET, FILM COATED ORAL DAILY
Qty: 90 TABLET | Refills: 1 | Status: SHIPPED | OUTPATIENT
Start: 2024-11-05

## 2024-11-06 ENCOUNTER — APPOINTMENT (OUTPATIENT)
Facility: HOSPITAL | Age: 54
End: 2024-11-06
Attending: INTERNAL MEDICINE
Payer: COMMERCIAL

## 2024-11-07 ENCOUNTER — CLINICAL SUPPORT (OUTPATIENT)
Dept: CARDIAC REHAB | Facility: CLINIC | Age: 54
End: 2024-11-07
Payer: COMMERCIAL

## 2024-11-07 DIAGNOSIS — Z95.1 S/P CABG X 3: Primary | ICD-10-CM

## 2024-11-07 PROCEDURE — 93798 PHYS/QHP OP CAR RHAB W/ECG: CPT

## 2024-11-08 ENCOUNTER — APPOINTMENT (OUTPATIENT)
Facility: HOSPITAL | Age: 54
End: 2024-11-08
Attending: INTERNAL MEDICINE
Payer: COMMERCIAL

## 2024-11-08 ENCOUNTER — CLINICAL SUPPORT (OUTPATIENT)
Dept: CARDIAC REHAB | Facility: CLINIC | Age: 54
End: 2024-11-08
Payer: COMMERCIAL

## 2024-11-08 DIAGNOSIS — Z95.1 S/P CABG X 3: Primary | ICD-10-CM

## 2024-11-08 DIAGNOSIS — I25.10 CAD, MULTIPLE VESSEL: Primary | ICD-10-CM

## 2024-11-08 DIAGNOSIS — I25.5 ISCHEMIC CARDIOMYOPATHY: ICD-10-CM

## 2024-11-08 PROCEDURE — 93798 PHYS/QHP OP CAR RHAB W/ECG: CPT

## 2024-11-08 RX ORDER — CLOPIDOGREL BISULFATE 75 MG/1
75 TABLET ORAL DAILY
Qty: 30 TABLET | Refills: 5 | Status: SHIPPED | OUTPATIENT
Start: 2024-11-08 | End: 2025-05-07

## 2024-11-08 RX ORDER — METOPROLOL SUCCINATE 25 MG/1
25 TABLET, EXTENDED RELEASE ORAL DAILY
Qty: 30 TABLET | Refills: 5 | Status: SHIPPED | OUTPATIENT
Start: 2024-11-08

## 2024-11-11 ENCOUNTER — APPOINTMENT (OUTPATIENT)
Facility: HOSPITAL | Age: 54
End: 2024-11-11
Attending: INTERNAL MEDICINE
Payer: COMMERCIAL

## 2024-11-12 ENCOUNTER — CLINICAL SUPPORT (OUTPATIENT)
Dept: CARDIAC REHAB | Facility: CLINIC | Age: 54
End: 2024-11-12
Payer: COMMERCIAL

## 2024-11-12 DIAGNOSIS — Z95.1 S/P CABG X 3: Primary | ICD-10-CM

## 2024-11-12 PROCEDURE — 93798 PHYS/QHP OP CAR RHAB W/ECG: CPT

## 2024-11-13 ENCOUNTER — APPOINTMENT (OUTPATIENT)
Facility: HOSPITAL | Age: 54
End: 2024-11-13
Attending: INTERNAL MEDICINE
Payer: COMMERCIAL

## 2024-11-14 ENCOUNTER — CLINICAL SUPPORT (OUTPATIENT)
Dept: CARDIAC REHAB | Facility: CLINIC | Age: 54
End: 2024-11-14
Payer: COMMERCIAL

## 2024-11-14 DIAGNOSIS — Z95.1 S/P CABG X 3: Primary | ICD-10-CM

## 2024-11-14 PROCEDURE — 93798 PHYS/QHP OP CAR RHAB W/ECG: CPT

## 2024-11-15 ENCOUNTER — APPOINTMENT (OUTPATIENT)
Facility: HOSPITAL | Age: 54
End: 2024-11-15
Attending: INTERNAL MEDICINE
Payer: COMMERCIAL

## 2024-11-15 ENCOUNTER — CLINICAL SUPPORT (OUTPATIENT)
Dept: CARDIAC REHAB | Facility: CLINIC | Age: 54
End: 2024-11-15
Payer: COMMERCIAL

## 2024-11-15 DIAGNOSIS — Z95.1 S/P CABG X 3: Primary | ICD-10-CM

## 2024-11-15 PROCEDURE — 93798 PHYS/QHP OP CAR RHAB W/ECG: CPT

## 2024-11-18 ENCOUNTER — APPOINTMENT (OUTPATIENT)
Facility: HOSPITAL | Age: 54
End: 2024-11-18
Attending: INTERNAL MEDICINE
Payer: COMMERCIAL

## 2024-11-19 ENCOUNTER — CLINICAL SUPPORT (OUTPATIENT)
Dept: CARDIAC REHAB | Facility: CLINIC | Age: 54
End: 2024-11-19
Payer: COMMERCIAL

## 2024-11-19 DIAGNOSIS — Z95.1 S/P CABG X 3: Primary | ICD-10-CM

## 2024-11-19 PROCEDURE — 93798 PHYS/QHP OP CAR RHAB W/ECG: CPT

## 2024-11-20 ENCOUNTER — APPOINTMENT (OUTPATIENT)
Facility: HOSPITAL | Age: 54
End: 2024-11-20
Attending: INTERNAL MEDICINE
Payer: COMMERCIAL

## 2024-11-21 ENCOUNTER — CLINICAL SUPPORT (OUTPATIENT)
Dept: CARDIAC REHAB | Facility: CLINIC | Age: 54
End: 2024-11-21
Payer: COMMERCIAL

## 2024-11-21 DIAGNOSIS — Z95.1 S/P CABG X 3: Primary | ICD-10-CM

## 2024-11-21 PROCEDURE — 93798 PHYS/QHP OP CAR RHAB W/ECG: CPT

## 2024-11-22 ENCOUNTER — APPOINTMENT (OUTPATIENT)
Facility: HOSPITAL | Age: 54
End: 2024-11-22
Attending: INTERNAL MEDICINE
Payer: COMMERCIAL

## 2024-11-22 ENCOUNTER — CLINICAL SUPPORT (OUTPATIENT)
Dept: CARDIAC REHAB | Facility: CLINIC | Age: 54
End: 2024-11-22
Payer: COMMERCIAL

## 2024-11-22 DIAGNOSIS — Z95.1 S/P CABG X 3: Primary | ICD-10-CM

## 2024-11-22 PROCEDURE — 93798 PHYS/QHP OP CAR RHAB W/ECG: CPT

## 2024-11-25 ENCOUNTER — APPOINTMENT (OUTPATIENT)
Facility: HOSPITAL | Age: 54
End: 2024-11-25
Attending: INTERNAL MEDICINE
Payer: COMMERCIAL

## 2024-11-26 ENCOUNTER — CLINICAL SUPPORT (OUTPATIENT)
Dept: CARDIAC REHAB | Facility: CLINIC | Age: 54
End: 2024-11-26
Payer: COMMERCIAL

## 2024-11-26 DIAGNOSIS — Z95.1 S/P CABG X 3: Primary | ICD-10-CM

## 2024-11-26 PROCEDURE — 93798 PHYS/QHP OP CAR RHAB W/ECG: CPT

## 2024-11-26 NOTE — PROGRESS NOTES
CARDIAC REHABILITATION   ASSESSMENT AND INDIVIDUALIZED TREATMENT PLAN  60 DAY          Today's date: 2024   # of Exercise Sessions Completed: 28  Patient name: Marcelina Ascencio      : 1970  Age: 54 y.o.       MRN: [id number]  Referring Physician: Mayco Nayak MD  Cardiologist: Mayco Nayak MD  Provider: Gilles  Clinician: Mulugeta Pearce MS,CEP,EPC         Treatment is tailored to this patient's individual needs.  The ITP was reviewed with the patient and all questions were answered to their satisfaction.  Additional ITP documentation can be found electronically including daily and monthly exercise summaries, daily session notes with ECG summaries, education notes, daily medication reconciliation, and daily physician supervision.      Comments:      2024: Marcelina has been attending rehab regularly for 31-50 min at 3.9 METS. Tolerating exercise progression well with no cardiac complaints.He has recenlty transferred over from [facility].      2024: Marcelina has attended 17 sessions of CR. He works at 3.2-5.4 METs on different exercise modalities. Marcelina will be returning to work Monday with lighter duties, he is very excited to get back to work. He will be transferring to [place] to complete CR, due to work being closer to that campus. In the next month, he would like to start going to the gym with his wife, continue his mediterranean diet, cut back on bread and increase overall daily hydration.         Dx:   Encounter Diagnosis   Name Primary?    S/P CABG x 3 Yes           Description of Diagnosis: s/p CABG x3 (LIMA-LAD, UDY-ktraup-rAVU, radial-OM), PERLA clip   Date of onset: 2024  Other Cardiac History: cardiomyopathy,chronic systolic heart failure,pradiation induced heart disease,1AVB,RBBB,HLD,HTN        ASSESSMENT    Medical History:   Past Medical History:   Diagnosis Date    1st degree AV block 2024    CAD, multiple vessel 2024    Cardiomyopathy (HCC)     Chronic systolic heart  failure (HCC) 04/23/2024    Class 2 obesity 05/13/2024    Hodgkin's lymphoma (HCC)     Hypercholesterolemia     Hyperlipidemia     Nephrolithiasis     Pericardial effusion 04/23/2024    Plantar fasciitis 08/28/2018    Pre-diabetes     Primary hypertension 04/23/2024    Radiation-induced heart disease 04/23/2024    RBBB 05/13/2024       Family History:  Family History   Problem Relation Age of Onset    Hypertension Mother     Heart attack Father         age 59.  was a heavy drinker.       Allergies:   Pollen extract    Current Medications:   Current Outpatient Medications   Medication Sig Dispense Refill    aspirin 81 mg chewable tablet Chew 1 tablet (81 mg total) daily 30 tablet 5    atorvastatin (LIPITOR) 80 mg tablet TAKE 1 TABLET BY MOUTH EVERY DAY 90 tablet 1    clopidogrel (PLAVIX) 75 mg tablet Take 1 tablet (75 mg total) by mouth daily 30 tablet 5    dapagliflozin (Farxiga) 10 MG tablet Take 1 tablet (10 mg total) by mouth daily 90 tablet 5    diltiazem (CARDIZEM CD) 120 mg 24 hr capsule Take 1 capsule (120 mg total) by mouth daily 30 capsule 5    metoprolol succinate (TOPROL-XL) 25 mg 24 hr tablet Take 1 tablet (25 mg total) by mouth daily 30 tablet 5    nitroglycerin (NITROSTAT) 0.4 mg SL tablet Place 1 tablet (0.4 mg total) under the tongue every 5 (five) minutes as needed for chest pain (Patient not taking: Reported on 10/9/2024) 25 tablet 5    sacubitril-valsartan (Entresto) 24-26 MG TABS Take 1 tablet by mouth 2 (two) times a day 60 tablet 11     No current facility-administered medications for this visit.       Medication compliance: Yes   Comments: Pt reports to be compliant with medications    Physical Limitations: none     Fall Risk: Moderate   Comments: Ambulates with a steady gait with no assist device and Denies a fall in the past 6 months    Cultural needs: none      CAD Risk Factors:  Cholesterol: Yes  HTN: Yes  DM: No  Obesity: Yes   Inactivity: Yes      EXERCISE ASSESSMENT:     Initial Fitness  Assessment: Submaximal TM ETT:  Resting:  BP: 106/66  HR: 87, Exercise:  BP: 108//62  HR: 100-119, and METs:  4.3      ECG INTERPRETATION:  NSR,RBBB,1AVB,isolated PVC    Current Functional Status:  Occupation: full time job [employer]   Recreation/Physical Activity: [employer]   ADL’s:resumed all ADLs within sternal precautions  [place]: resumed all ADLs within sternal precautions  Home exercise: none  Other Comments: none      SMART Exercise Goals:   10% improvement in functional capacity based on max METs achieved in initial fitness assessment  reduced dyspnea with physical activity    improved DASI score by 10%  increased exercise capacity by 40% based on peak METs tolerated in cardiac rehab exercise session  maintain > 150 minutes per week of moderate intensity exercise    Patient Specific EXERCISE GOALS:       Increase strength to carry heavy appliances  Decrease wt by 15 lbs in 12 weeks  Increase stamina and energy     Functional Capacity Screening Tool:  [facility] Activity Status Index:  5.07 METs      PSYCHOSOCIAL ASSESSMENT:    Date of last Assessment:  9/18/2024  Depression screening:  PHQ-9 = 5    Interpretation:  5-9 = Mild Depression  Anxiety screening:  STEPHEN-7 = 1    Interpretation: 0-4  = Not anxious    Pt self-report of depression and anxiety   Patient reports they are coping well with good social support and denies depression or anxiety  Reports sufficient emotional support     Self-reported stress level:  2   Stressors:  pt reports not stress at the moment   Stress Management Tools: TV and music    SMART Psychosocial Goals:     Physical Fitness in [place] Score < 3, Daily Activity in [place] Score < 3, Social Activities in [place] Score < 3, Pain in [place] Score < 3, Overall Health in [place] Score < 3, improved sleeping habits, feel less tired with more energy, Improved appetite control, and take time to relax    Patient Specific PSYCHOCOSOCIAL GOALS:    Reports no  "psychosocial concerns that will get in the way of rehab  Decrease the thought of a reoccurrence of a cardiac event      Quality of Life Screen:  (Higher score indicates disease impact on QOL)  [facility] COOP score: 21/45     Social Support:   spouse and children  Community/Social Activities: none     Psychosocial Assessment as it relates to rehabilitation:   Patient denies issues with his/her family or home life that may affect their rehabilitation efforts.       NUTRITION ASSESSMENT:    Initial Weight:  264.2  Current Weight: 266.6lbs    Height:   Ht Readings from Last 1 Encounters:   10/09/24 6' 1\" (1.854 m)       Rate Your Plate Score: 68/81    Diabetes: N/A  A1c: 5.8    last measured: 8/4/2024    Lipid management: Discussed diet and lipid management and Last lipid profile 1/2/2024  Chol 174    HDL 53  LDL 91    Current Dietary Habits:  Mediterranean diet   Eats a large amount of bread  SMART Nutrition Goals:   reduced BMI to < 25, TRG <150, decreased body fat% <25%   (M), Wt. loss 1 ppw,  goal of 15 lbs., eat whole grain breads, brown rice and whole grain cereals, eat 5 or more servings of fruits and vegetables a day, and rarely eat processed meats or eat low fat processed meats    Patient Specific NUTRITION GOALS:     1. Decrease the amount of bread consumed    2. Increase water consumption to 64 ounces    3. Pick low sodium lunch meats     Drug/Alcohol Use:   Yes, social drinker       OTHER CORE COMPONENT ASSESSMENT:    Tobacco Use:     N/A:  Patient is a non-smoker     Anginal Symptoms:  chest pressure   NTG use: Compliant with carrying NTG and Understands proper use    SMART Goals:   consistent, controlled resting BP < 130/80, medication compliance, and reduced angina    Patient Specific CORE COMPONENT GOALS:    Carry NTG at all times   Monitor BP at home         INDIVIDUALIZED TREATMENT PLAN      EXERCISE GOALS and PLAN      Progress toward Exercise goals:   Pt is progressing and showing improvement  " toward the following goals:  attending CR daily, increase exercise intensities, working at 3.9 METS,no cardiac complaints.  , Pt has not made progress toward the following goals: no home exercise. , Will continue to educate and progress as tolerated.     Exercise Intervention/plan:    education on home exercise guidelines, home exercise 30+ mins 2 days opposite CR, Group class: Risk Factors for Heart Disease, Patient education:   Exercise After Cardiac Rehabilitation, and After discharge patient will continue to follow a regular exercise regimen with the goal of a minimum of 150 minutes per week of moderate intensity exercise.      The patient was counseled on exercise guidelines to achieve a minimum of 150 mins/wk of moderate intensity (RPE 4-6)   exercise and encouraged to add 1-2 days of exercise on opposite days of cardiac rehab as tolerated.       PHYSICIAN PRESCRIBED EXERCISE:    Current Aerobic Exercise Prescription:      Frequency: 1 days/week   Supplement with home exercise 2+ days/wk as tolerated       Minutes: 31-50        METS: 3.9          HR: 114-126   RPE: 4         Modalities: Treadmill, Lifecycle, rower    Exercise workloads will be progressed gradually as tolerated, within limits of patient's ability, and according to the patient's   response to the exercise program.      Aerobic Exercise Prescription Plan for Progression   Frequency: 3 days/week of cardiac rehab       Supplement with home exercise 2+ days/wk as tolerated    Minutes: 45-50       >150 mins/wk of moderate intensity exercise   METS: 3.6-4.6   HR: RHR +30-40bpm     RPE: 4-6   Modalities: Treadmill, UBE, Lifecycle, and NuStep    Strength trainin-3 days / week  12-15 repetitions  1-2 sets per modality   Will be added following at least 8 weeks post surgery and 8-10 monitored sessions   Modalities: Leg Press, Chest Press, Pull Downs, Arm Extension, and Arm Curl    Home Exercise: none    Exercise Education: benefit of exercise for CAD  risk factors, home exercise guidelines, AHA guidelines to achieve >150 mins/wk of moderate exercise, and RPE scale     Readiness to change: Preparation:  (Getting ready to change)  and Action:  (Changing behavior)      NUTRITION GOALS AND PLAN      Nutritional   Reviewed patient's Rate your Plate. Discussed key elements of heart healthy eating. Reviewed patient goals for dietary modifications and their clinical implications.  Reviewed most recent lipid profile.     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  , Will continue to educate and progress as tolerated. He wants to decrease bread intake and increase daily hydration      Nutrition Intervention/plan:   group class: Reading Food Labels, group Class: Heart Healthy Eating, increase daily intake of fruits and vegetables, choose lean red meat, never/rarely add salt to food when cooking or at the table, choose low sodium canned, frozen, packaged foods or rarely eat these foods, and rarely/never eat salty snacks    Measurable goals were based Rate Your Plate Dietary Self-Assessment. These are the areas in which the patient could score higher on the assessment.  Goals include recommendations for a heart healthy diet based on American Heart Association.    Nutrition Education:   heart healthy eating principles  weight loss and management strategies  low sodium diet  maintaining hydration  nutrition for  lipid management  healthy choices while dining out    Readiness to change: Action:  (Changing behavior)      PSYCHOSOCIAL GOALS AND PLAN    Psychosocial Assessment as it relates to rehabilitation:   Patient denies issues with his/her family or home life that may affect their rehabilitation efforts.     Patient's progress toward  Psychosocial goals:    Pt is progressing and showing improvement  toward the following goals:  maintaining emotional health with good support at home as needed.  , Will continue to educate and progress as tolerated.    Psychosocial Intervention/plan:   Class: Stress and Your Health, Class: Relaxation, Exercise, Learn how to relax and slow down, Join a support group , Meet new people, and Enjoy family    Psychosocial Education: signs/sxs of depression, benefits of a positive support system, stress management techniques, and benefits of enrolling in [employer]    Information to utilize [employer] was provided as well as contact information for counseling through  Behavioral Health and group psychotherapy groups available.    Readiness to change: Action:  (Changing behavior)      OTHER CORE COMPONENTS GOALS and PLAN      Blood Pressure  Restin//76  Exercise:110//78  Recovery:80//625    Blood Pressure will be monitored throughout the program and cardiologist will be notified of elevated trends.    Pt will be encouraged to monitor home BP if advised by cardiologist.    Tobacco Intervention/plan:   N/A:  Pt is a non-smoker    Progress toward Core Component goals:   Pt is progressing and showing improvement  toward the following goals:  watching sodium intake, hydrating, wants to increase daily hydration, occasional hypotensive Bps, staff encourages hydration .  , Will continue to educate and progress as tolerated.    Other Core Components Intervention:   group class: Understanding Heart Disease, group class: Common Heart Medications, medication compliance, eliminate salt shaker at the table, and use salt substitutes    Group and Individual Education:  understanding high blood pressure and it's relationship to CAD, components of blood pressure management, low sodium diet and heart failure, and proper use of sublingual NTG    Readiness to change: Action:  (Changing behavior)

## 2024-11-27 ENCOUNTER — APPOINTMENT (OUTPATIENT)
Facility: HOSPITAL | Age: 54
End: 2024-11-27
Attending: INTERNAL MEDICINE
Payer: COMMERCIAL

## 2024-11-28 ENCOUNTER — APPOINTMENT (OUTPATIENT)
Dept: CARDIAC REHAB | Facility: CLINIC | Age: 54
End: 2024-11-28
Payer: COMMERCIAL

## 2024-11-29 ENCOUNTER — APPOINTMENT (OUTPATIENT)
Dept: CARDIAC REHAB | Facility: CLINIC | Age: 54
End: 2024-11-29
Payer: COMMERCIAL

## 2024-11-29 ENCOUNTER — APPOINTMENT (OUTPATIENT)
Facility: HOSPITAL | Age: 54
End: 2024-11-29
Attending: INTERNAL MEDICINE
Payer: COMMERCIAL

## 2024-12-02 DIAGNOSIS — I25.5 ISCHEMIC CARDIOMYOPATHY: ICD-10-CM

## 2024-12-02 DIAGNOSIS — I25.10 CAD, MULTIPLE VESSEL: ICD-10-CM

## 2024-12-03 ENCOUNTER — CLINICAL SUPPORT (OUTPATIENT)
Dept: CARDIAC REHAB | Facility: CLINIC | Age: 54
End: 2024-12-03
Payer: COMMERCIAL

## 2024-12-03 DIAGNOSIS — Z95.1 S/P CABG X 3: Primary | ICD-10-CM

## 2024-12-03 PROCEDURE — 93798 PHYS/QHP OP CAR RHAB W/ECG: CPT

## 2024-12-03 RX ORDER — CLOPIDOGREL BISULFATE 75 MG/1
75 TABLET ORAL DAILY
Qty: 90 TABLET | Refills: 1 | Status: SHIPPED | OUTPATIENT
Start: 2024-12-03

## 2024-12-03 RX ORDER — METOPROLOL SUCCINATE 25 MG/1
25 TABLET, EXTENDED RELEASE ORAL DAILY
Qty: 90 TABLET | Refills: 1 | Status: SHIPPED | OUTPATIENT
Start: 2024-12-03

## 2024-12-05 ENCOUNTER — CLINICAL SUPPORT (OUTPATIENT)
Dept: CARDIAC REHAB | Facility: CLINIC | Age: 54
End: 2024-12-05
Payer: COMMERCIAL

## 2024-12-05 DIAGNOSIS — Z95.1 S/P CABG X 3: Primary | ICD-10-CM

## 2024-12-05 PROCEDURE — 93798 PHYS/QHP OP CAR RHAB W/ECG: CPT

## 2024-12-06 ENCOUNTER — CLINICAL SUPPORT (OUTPATIENT)
Dept: CARDIAC REHAB | Facility: CLINIC | Age: 54
End: 2024-12-06
Payer: COMMERCIAL

## 2024-12-06 DIAGNOSIS — Z95.1 S/P CABG X 3: Primary | ICD-10-CM

## 2024-12-06 PROCEDURE — 93798 PHYS/QHP OP CAR RHAB W/ECG: CPT

## 2024-12-07 ENCOUNTER — APPOINTMENT (OUTPATIENT)
Dept: LAB | Facility: HOSPITAL | Age: 54
End: 2024-12-07
Payer: COMMERCIAL

## 2024-12-07 DIAGNOSIS — E87.5 HYPERKALEMIA: ICD-10-CM

## 2024-12-07 LAB
ANION GAP SERPL CALCULATED.3IONS-SCNC: 4 MMOL/L (ref 4–13)
BUN SERPL-MCNC: 20 MG/DL (ref 5–25)
CALCIUM SERPL-MCNC: 9.5 MG/DL (ref 8.4–10.2)
CHLORIDE SERPL-SCNC: 107 MMOL/L (ref 96–108)
CO2 SERPL-SCNC: 30 MMOL/L (ref 21–32)
CREAT SERPL-MCNC: 1.09 MG/DL (ref 0.6–1.3)
GFR SERPL CREATININE-BSD FRML MDRD: 77 ML/MIN/1.73SQ M
GLUCOSE P FAST SERPL-MCNC: 92 MG/DL (ref 65–99)
POTASSIUM SERPL-SCNC: 4.8 MMOL/L (ref 3.5–5.3)
SODIUM SERPL-SCNC: 141 MMOL/L (ref 135–147)

## 2024-12-07 PROCEDURE — 36415 COLL VENOUS BLD VENIPUNCTURE: CPT

## 2024-12-07 PROCEDURE — 80048 BASIC METABOLIC PNL TOTAL CA: CPT

## 2024-12-08 ENCOUNTER — RESULTS FOLLOW-UP (OUTPATIENT)
Dept: CARDIOLOGY CLINIC | Facility: CLINIC | Age: 54
End: 2024-12-08

## 2024-12-10 ENCOUNTER — CLINICAL SUPPORT (OUTPATIENT)
Dept: CARDIAC REHAB | Facility: CLINIC | Age: 54
End: 2024-12-10
Payer: COMMERCIAL

## 2024-12-10 DIAGNOSIS — Z95.1 S/P CABG X 3: Primary | ICD-10-CM

## 2024-12-10 PROCEDURE — 93798 PHYS/QHP OP CAR RHAB W/ECG: CPT

## 2024-12-12 ENCOUNTER — CLINICAL SUPPORT (OUTPATIENT)
Dept: CARDIAC REHAB | Facility: CLINIC | Age: 54
End: 2024-12-12
Payer: COMMERCIAL

## 2024-12-12 DIAGNOSIS — Z95.1 S/P CABG X 3: Primary | ICD-10-CM

## 2024-12-12 PROCEDURE — 93798 PHYS/QHP OP CAR RHAB W/ECG: CPT

## 2024-12-13 ENCOUNTER — APPOINTMENT (OUTPATIENT)
Dept: CARDIAC REHAB | Facility: CLINIC | Age: 54
End: 2024-12-13
Payer: COMMERCIAL

## 2024-12-17 ENCOUNTER — CLINICAL SUPPORT (OUTPATIENT)
Dept: CARDIAC REHAB | Facility: CLINIC | Age: 54
End: 2024-12-17
Payer: COMMERCIAL

## 2024-12-17 DIAGNOSIS — Z95.1 S/P CABG X 3: Primary | ICD-10-CM

## 2024-12-17 PROCEDURE — 93798 PHYS/QHP OP CAR RHAB W/ECG: CPT

## 2024-12-19 ENCOUNTER — CLINICAL SUPPORT (OUTPATIENT)
Dept: CARDIAC REHAB | Facility: CLINIC | Age: 54
End: 2024-12-19
Payer: COMMERCIAL

## 2024-12-19 DIAGNOSIS — Z95.1 S/P CABG X 3: Primary | ICD-10-CM

## 2024-12-19 PROCEDURE — 93798 PHYS/QHP OP CAR RHAB W/ECG: CPT

## 2024-12-24 ENCOUNTER — CLINICAL SUPPORT (OUTPATIENT)
Dept: CARDIAC REHAB | Facility: CLINIC | Age: 54
End: 2024-12-24
Payer: COMMERCIAL

## 2024-12-24 DIAGNOSIS — Z95.1 S/P CABG X 3: Primary | ICD-10-CM

## 2024-12-24 DIAGNOSIS — I42.0 DILATED CARDIOMYOPATHY (HCC): ICD-10-CM

## 2024-12-24 PROCEDURE — 93798 PHYS/QHP OP CAR RHAB W/ECG: CPT

## 2024-12-26 ENCOUNTER — CLINICAL SUPPORT (OUTPATIENT)
Dept: CARDIAC REHAB | Facility: CLINIC | Age: 54
End: 2024-12-26
Payer: COMMERCIAL

## 2024-12-26 DIAGNOSIS — Z95.1 S/P CABG X 3: Primary | ICD-10-CM

## 2024-12-26 PROCEDURE — 93797 PHYS/QHP OP CAR RHAB WO ECG: CPT

## 2024-12-26 RX ORDER — DAPAGLIFLOZIN 10 MG/1
10 TABLET, FILM COATED ORAL DAILY
Qty: 90 TABLET | Refills: 1 | Status: SHIPPED | OUTPATIENT
Start: 2024-12-26

## 2024-12-26 NOTE — PROGRESS NOTES
CARDIAC REHABILITATION   ASSESSMENT AND INDIVIDUALIZED TREATMENT PLAN  DISCHARGE            Today's date: 2024   # of Exercise Sessions Completed: 36  Patient name: Marcelina Ascencio      : 1970  Age: 54 y.o.       MRN: [id number]  Referring Physician: Mayco Nayak MD  Cardiologist: Mayco Nayak MD  Provider: Gilles  Clinician: Mulugeta Pearce MS,CEP,EPC         Treatment is tailored to this patient's individual needs.  The ITP was reviewed with the patient and all questions were answered to their satisfaction.  Additional ITP documentation can be found electronically including daily and monthly exercise summaries, daily session notes with ECG summaries, education notes, daily medication reconciliation, and daily physician supervision.      Comments:      2024: Discharge: Riley completed 36 sessions of CR to complete the program. Unable to obtain final submax TM test due to technical difficulty. Pt has improvements in his PHQ-9 and [facility] COOP questionnaires. Marcelina will continue to exercise at a local gym near him 3-4 days/week for 45-60 min.      2024: Marcelina has been attending rehab regularly for 31-50 min at 3.9 METS. Tolerating exercise progression well with no cardiac complaints.He has recenlty transferred over from [facility].      2024: Marcelina has attended 17 sessions of CR. He works at 3.2-5.4 METs on different exercise modalities. Marcelina will be returning to work Monday with lighter duties, he is very excited to get back to work. He will be transferring to [place] to complete CR, due to work being closer to that campus. In the next month, he would like to start going to the gym with his wife, continue his mediterranean diet, cut back on bread and increase overall daily hydration.         Dx:   Encounter Diagnosis   Name Primary?    S/P CABG x 3 Yes           Description of Diagnosis: s/p CABG x3 (LIMA-LAD, [id number], radial-OM), PERLA clip   Date of onset: 2024  Other  Cardiac History: cardiomyopathy,chronic systolic heart failure,pradiation induced heart disease,1AVB,RBBB,HLD,HTN        ASSESSMENT    Medical History:   Past Medical History:   Diagnosis Date    1st degree AV block 05/13/2024    CAD, multiple vessel 05/13/2024    Cardiomyopathy (HCC)     Chronic systolic heart failure (HCC) 04/23/2024    Class 2 obesity 05/13/2024    Hodgkin's lymphoma (HCC)     Hypercholesterolemia     Hyperlipidemia     Nephrolithiasis     Pericardial effusion 04/23/2024    Plantar fasciitis 08/28/2018    Pre-diabetes     Primary hypertension 04/23/2024    Radiation-induced heart disease 04/23/2024    RBBB 05/13/2024       Family History:  Family History   Problem Relation Age of Onset    Hypertension Mother     Heart attack Father         age 59.  was a heavy drinker.       Allergies:   Pollen extract    Current Medications:   Current Outpatient Medications   Medication Sig Dispense Refill    aspirin 81 mg chewable tablet Chew 1 tablet (81 mg total) daily 30 tablet 5    atorvastatin (LIPITOR) 80 mg tablet TAKE 1 TABLET BY MOUTH EVERY DAY 90 tablet 1    clopidogrel (PLAVIX) 75 mg tablet TAKE 1 TABLET BY MOUTH EVERY DAY 90 tablet 1    dapagliflozin (Farxiga) 10 MG tablet Take 1 tablet (10 mg total) by mouth daily 90 tablet 1    diltiazem (CARDIZEM CD) 120 mg 24 hr capsule Take 1 capsule (120 mg total) by mouth daily 30 capsule 5    metoprolol succinate (TOPROL-XL) 25 mg 24 hr tablet TAKE 1 TABLET (25 MG TOTAL) BY MOUTH DAILY. 90 tablet 1    nitroglycerin (NITROSTAT) 0.4 mg SL tablet Place 1 tablet (0.4 mg total) under the tongue every 5 (five) minutes as needed for chest pain (Patient not taking: Reported on 10/9/2024) 25 tablet 5    sacubitril-valsartan (Entresto) 24-26 MG TABS Take 1 tablet by mouth 2 (two) times a day 60 tablet 11     No current facility-administered medications for this visit.       Medication compliance: Yes   Comments: Pt reports to be compliant with medications    Physical  Limitations: none     Fall Risk: Moderate   Comments: Ambulates with a steady gait with no assist device and Denies a fall in the past 6 months    Cultural needs: none      CAD Risk Factors:  Cholesterol: Yes  HTN: Yes  DM: No  Obesity: Yes   Inactivity: Yes      EXERCISE ASSESSMENT:     Initial Fitness Assessment: Submaximal TM ETT:  Resting:  BP: 106/66  HR: 87, Exercise:  BP: 108//62  HR: 100-119, and METs:  4.3      ECG INTERPRETATION:  NSR,RBBB,1AVB,isolated PVC    Current Functional Status:  Occupation: full time job [employer]   Recreation/Physical Activity: [employer]   ADL’s:resumed all ADLs within sternal precautions  [place]: resumed all ADLs  Home exercise: none  Other Comments: none      SMART Exercise Goals:   10% improvement in functional capacity based on max METs achieved in initial fitness assessment  reduced dyspnea with physical activity    improved DASI score by 10%  increased exercise capacity by 40% based on peak METs tolerated in cardiac rehab exercise session  maintain > 150 minutes per week of moderate intensity exercise    Patient Specific EXERCISE GOALS:       Increase strength to carry heavy appliances  Decrease wt by 15 lbs in 12 weeks  Increase stamina and energy     Functional Capacity Screening Tool:  [facility] Activity Status Index:  5.07 METs      PSYCHOSOCIAL ASSESSMENT:    Date of last Assessment:  12/26/2024  Depression screening:  PHQ-9 = 3  Interpretation:  1-4 = Minimal Depression  Anxiety screening:  STEPHEN-7 = 2  Interpretation: 0-4  = Not anxious    Pt self-report of depression and anxiety   Patient reports they are coping well with good social support and denies depression or anxiety  Reports sufficient emotional support     Self-reported stress level:  2   Stressors:  pt reports not stress at the moment   Stress Management Tools: TV and music    SMART Psychosocial Goals:     Physical Fitness in [facility] Score < 3, Daily Activity in [facility] Score < 3, Social  "Activities in [facility] Score < 3, Pain in [facility] Score < 3, Overall Health in [facility] Score < 3, improved sleeping habits, feel less tired with more energy, Improved appetite control, and take time to relax    Patient Specific PSYCHOCOSOCIAL GOALS:    Reports no psychosocial concerns that will get in the way of rehab  Decrease the thought of a reoccurrence of a cardiac event      Quality of Life Screen:  (Higher score indicates disease impact on QOL)  [facility] COOP score: 21/45     Social Support:   spouse and children  Community/Social Activities: none     Psychosocial Assessment as it relates to rehabilitation:   Patient denies issues with his/her family or home life that may affect their rehabilitation efforts.       NUTRITION ASSESSMENT:    Initial Weight:  264.2  Current Weight: 267.0 lbs    Height:   Ht Readings from Last 1 Encounters:   10/09/24 6' 1\" (1.854 m)       Rate Your Plate Score: 68/81    Diabetes: N/A  A1c: 5.8    last measured: 8/4/2024    Lipid management: Discussed diet and lipid management and Last lipid profile 1/2/2024  Chol 174    HDL 53  LDL 91    Current Dietary Habits:  Mediterranean diet   Eats a large amount of bread  SMART Nutrition Goals:   reduced BMI to < 25, TRG <150, decreased body fat% <25%   (M), Wt. loss 1 ppw,  goal of 15 lbs., eat whole grain breads, brown rice and whole grain cereals, eat 5 or more servings of fruits and vegetables a day, and rarely eat processed meats or eat low fat processed meats    Patient Specific NUTRITION GOALS:     1. Decrease the amount of bread consumed    2. Increase water consumption to 64 ounces    3. Pick low sodium lunch meats     Drug/Alcohol Use:   Yes, social drinker       OTHER CORE COMPONENT ASSESSMENT:    Tobacco Use:     N/A:  Patient is a non-smoker     Anginal Symptoms:  chest pressure   NTG use: Compliant with carrying NTG and Understands proper use    SMART Goals:   consistent, controlled resting BP < 130/80, " medication compliance, and reduced angina    Patient Specific CORE COMPONENT GOALS:    Carry NTG at all times   Monitor BP at home         INDIVIDUALIZED TREATMENT PLAN      EXERCISE GOALS and PLAN      Progress toward Exercise goals:   Goals met: completed 36 sessions of CR with increase strength and endurance., Patient will be encouraged to focus on lifestyle modifications following discharge.     Exercise Intervention/plan:    education on home exercise guidelines, home exercise 30+ mins 2 days opposite CR, Group class: Risk Factors for Heart Disease, Patient education:   Exercise After Cardiac Rehabilitation, and After discharge patient will continue to follow a regular exercise regimen with the goal of a minimum of 150 minutes per week of moderate intensity exercise.      The patient was counseled on exercise guidelines to achieve a minimum of 150 mins/wk of moderate intensity (RPE 4-6)   exercise and encouraged to add 1-2 days of exercise on opposite days of cardiac rehab as tolerated.       PHYSICIAN PRESCRIBED EXERCISE:    Current Aerobic Exercise Prescription:      Frequency: 3 days/week   Supplement with home exercise 2+ days/wk as tolerated       Minutes: 35-50        METS: 3.8-3.9          HR:    RPE: 4         Modalities: Treadmill, [employer], rower    Exercise workloads will be progressed gradually as tolerated, within limits of patient's ability, and according to the patient's   response to the exercise program.      Aerobic/strength  Exercise Prescription Plan for Progression  Joined [employer] near his home: will attend 3-4 days/week 45-60 min      Exercise Education: benefit of exercise for CAD risk factors, home exercise guidelines, AHA guidelines to achieve >150 mins/wk of moderate exercise, and RPE scale     Readiness to change: Action:  (Changing behavior)      NUTRITION GOALS AND PLAN      Nutritional   Reviewed patient's Rate your Plate. Discussed key elements of heart healthy  eating. Reviewed patient goals for dietary modifications and their clinical implications.  Reviewed most recent lipid profile.     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  , Patient will be encouraged to focus on lifestyle modifications following discharge. He wants to decrease bread intake and increase daily hydration      Nutrition Intervention/plan:   group class: Reading Food Labels, group Class: Heart Healthy Eating, increase daily intake of fruits and vegetables, choose lean red meat, never/rarely add salt to food when cooking or at the table, choose low sodium canned, frozen, packaged foods or rarely eat these foods, and rarely/never eat salty snacks    Measurable goals were based Rate Your Plate Dietary Self-Assessment. These are the areas in which the patient could score higher on the assessment.  Goals include recommendations for a heart healthy diet based on American Heart Association.    Nutrition Education:   heart healthy eating principles  weight loss and management strategies  low sodium diet  maintaining hydration  nutrition for  lipid management  healthy choices while dining out  portion control  group class: Heart Healthy Eating  group class:  Label Reading    Readiness to change: Action:  (Changing behavior)      PSYCHOSOCIAL GOALS AND PLAN    Psychosocial Assessment as it relates to rehabilitation:   Patient denies issues with his/her family or home life that may affect their rehabilitation efforts.     Patient's progress toward Psychosocial goals:    Pt is progressing and showing improvement  toward the following goals:  maintaining emotional health with good support at home as needed.  , Patient will be encouraged to focus on lifestyle modifications  following discharge.    Psychosocial Intervention/plan:   Class: Stress and Your Health, Class: Relaxation, Exercise, Learn how to relax and slow down, Join a support group , Meet new people, and Enjoy family    Psychosocial Education: signs/sxs of depression, benefits of a positive support system, stress management techniques, and benefits of enrolling in [facility]    Information to utilize [employer] was provided as well as contact information for counseling through  Behavioral Health and group psychotherapy groups available.    Readiness to change: Action:  (Changing behavior)      OTHER CORE COMPONENTS GOALS and PLAN      Blood Pressure  Restin//78  Exercise:104//78  Recovery:88//70    Blood Pressure will be monitored throughout the program and cardiologist will be notified of elevated trends.    Pt will be encouraged to monitor home BP if advised by cardiologist.    Tobacco Intervention/plan:   N/A:  Pt is a non-smoker    Progress toward Core Component goals:   Pt is progressing and showing improvement  toward the following goals:  watching sodium intake, hydrating, wants to increase daily hydration, occasional hypotensive Bps, staff encourages hydration .  , Patient will be encouraged to focus on lifestyle modifications following discharge.    Other Core Components Intervention:   group class: Understanding Heart Disease, group class: Common Heart Medications, medication compliance, eliminate salt shaker at the table, and use salt substitutes    Group and Individual Education:  understanding high blood pressure and it's relationship to CAD, components of blood pressure management, low sodium diet and heart failure, proper use of sublingual NTG, group class: Understanding Heart Disease, and group class:  Common Heart Medications    Readiness to change: Action:  (Changing behavior)

## 2024-12-30 NOTE — PROGRESS NOTES
Cardio-Oncology / Heart Failure Cardiology Clinic Note    Marcelina Ascencio 54 y.o. male   MRN: [id number]  Encounter: [id number]        Assessment / Plan:    # Cardio-Oncology Pertinent History  Hodgkin's lymphoma  Dx age 19  Initial treatment included - ABVD.     Relapse 1 year later, had MOPP and chest radiation.  Hx of bone marrow transplant 1991    # mixed ischemic and non-ischemic cardiomyopathy  # HFrEF - chronic    ETIOLOGY:  - new dx 6/2023.  EF 43% at that time.  - [facility] - multivessel CAD  - suspect 2/2 CAD + prior anthracycline therapy    VOLUME:  - not requiring diuretic  - Exam - euvolemic  - baseline BNP - 73    GDMT:  - toprol 25 daily   (anticipate uptitrate this when stopping dilt)  - entresto 24-26 BID  - spironlactone caused hyperkalemia and was DC'd  - dapagliflozin 10mg daily  - CT surgery added dilt - eventually should stop this (stop 1 year post CABG)    DEVICE:  - has RBBB  - EF 45%    # CAD s/p CABG  New dx on [facility] May 2024.  S/p CABG x 3 in Aug 2024.  LIMA-LAD, [id number], radial-OM.  Also had PERLA clip.   ASA, plavix  (CT surgery wants DAPT for 1 year)  Statin  Diltiazem x 1 year to prevent radial artery spasm    # small-moderate pericardial effusion  Seen on 2023 and 2024 echo.  ? Due to prior radiation  No evidence of tamponade  Will need serial f/u of this going forward  (improved on post CABG echo)    # Radiation heart valve disease  Echo shows significant MAC.  The aortic valve is calcified and there is mild to moderate AI and mild-moderate AS.  Will need serial follow-up of valve disease as it is likely to progress    # HTN  See GDMT above.      # HLD  On lipitor 80  Repeat lipids to make sure LDL at goal.      Today's Plan Summary:  See above assessment/plan for full details of today's plan.  Briefly,     Check fasting lipids                  Reason For Visit / Chief Complaint:  F/u -  CAD, cardiomyopathy, and history of cancer therapies for lymphoma    HPI:   Marcelina Ascencio is a 54 y.o.   male with history as noted in the problem list and further detailed in the above assessment and plan.    Initial:   April 2024  Referred by Dr. Butler (general cardiology) for a new patient visit for dilated cardiomyopathy and history of cancer therapies for lymphoma  As above, the patient has a distant history of lymphoma.  Treatment included anthracycline based chemotherapy and radiation.  He also had a bone marrow transplant.  In June 2023 he had some shortness of breath for which an echo was obtained and demonstrating an EF of 43%.  This was a new diagnosis.  He was referred to cardiology and started on beta-blocker/ACE.  A repeat echo in January 2024 reported an EF down to 35% (although the biplane measurement was 43%).  Given his history of anthracycline and radiation he was referred to cardio-oncology/heart failure clinic.  Today, the patient reports -  no chest pain.  Does get BARRERA.   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   .  Had a daughter.  Smokes cigars.      Interval:  Last visit -->   feeling well.   No chest pain.  Just sternal pain.    Plan last visit -->     Restart Entresto at lowest dose  BMP 1 week    10-21-24 - BMP - K 5.4. Cr 1.3.    I recommended: Increasing hydration, low potassium diet, and repeating a BMP in 1 to 2 weeks.     12-7-24 -  BMP -  K and Cr back to baseline.     Today -  finished cardiac rehab.  No chest pain.   No SOB or BARRERA.   No edema.              Cardiac Imaging personally reviewed:  EKG 7-5-23  Sinus rhythm with first-degree AV block  Right bundle branch block    4-23-24  Sinus rhythm with first-degree AV block  Right bundle branch block       Holter or event monitor    Echo 6-6-23  EF 43%  Small pericardial effusion    1-16-24  LVIDd 5.5cm.   EF 35% per report (43% by biplane)  Normal RV size and function  Mild AS.  Mild-mod AI  Small-moderate pericardial effusion, no tamponade    5-7-24  EF 40%.  Mild AS.  Mild-mod AI  small-moderate pericardial  effusion, no tamponade    8-7-24  (OSH - post CABG)  EF 45%  Moderate aortic stenosis         SOLANGE    Cardiac MRI    Stress testing    Coronary CTA or [facility] - 05/07/24   ·  Ost LM lesion is 60% stenosed.  ·  Mid-distal LM lesion is 70% stenosed.  ·  Ost LAD lesion is 80% stenosed.  ·  Ost Cx to Prox Cx lesion is 85% stenosed.  ·  Mid Cx lesion is 60% stenosed.  ·  Dist RCA lesion is 70% stenosed.       [facility]    CPET              Patient Active Problem List    Diagnosis Date Noted   • HFrEF (heart failure with reduced ejection fraction) ([facility]) 10/09/2024   • RBBB 05/13/2024   • CAD, multiple vessel 05/13/2024   • Class 2 obesity 05/13/2024   • 1st degree AV block 05/13/2024   • Left main coronary artery disease 05/13/2024   • Chronic systolic heart failure (HCC) 04/23/2024   • Pericardial effusion 04/23/2024   • Radiation-induced heart disease 04/23/2024   • Primary hypertension 04/23/2024   • Mixed hyperlipidemia 04/23/2024   • Cardiomyopathy (HCC) 07/05/2023   • Family history of ischemic bowel disease 07/05/2023   • Plantar fasciitis 08/28/2018   • Mononeuropathy 11/04/2016   • Hypercholesterolemia 04/09/2014       Past Medical History:   Diagnosis Date   • 1st degree AV block 05/13/2024   • CAD, multiple vessel 05/13/2024   • Cardiomyopathy ([facility])    • Chronic systolic heart failure (HCC) 04/23/2024   • Class 2 obesity 05/13/2024   • Hodgkin's lymphoma ([facility])    • Hypercholesterolemia    • Hyperlipidemia    • Nephrolithiasis    • Pericardial effusion 04/23/2024   • Plantar fasciitis 08/28/2018   • Pre-diabetes    • Primary hypertension 04/23/2024   • Radiation-induced heart disease 04/23/2024   • RBBB 05/13/2024       Allergies   Allergen Reactions   • Pollen Extract Eye Swelling     Reports allergy to pollens during the summer       Current Outpatient Medications   Medication Instructions   • aspirin 81 mg, Oral, Daily   • atorvastatin (LIPITOR) 80 mg, Oral, Daily   • clopidogrel (PLAVIX) 75 mg, Oral, Daily   •  "dapagliflozin (FARXIGA) 10 mg, Oral, Daily   • diltiazem (CARDIZEM CD) 120 mg, Oral, Daily   • metoprolol succinate (TOPROL-XL) 25 mg, Oral, Daily   • nitroglycerin (NITROSTAT) 0.4 mg, Sublingual, Every 5 minutes PRN   • sacubitril-valsartan (Entresto) 24-26 MG TABS 1 tablet, Oral, 2 times daily       Social History     Socioeconomic History   • Marital status: /Civil Union     Spouse name: Not on file   • Number of children: Not on file   • Years of education: Not on file   • Highest education level: Not on file   Occupational History   • Not on file   Tobacco Use   • Smoking status: Light Smoker     Types: Cigars   • Smokeless tobacco: Never   • Tobacco comments:     Patient smokes cigars 3-4 times a week   Vaping Use   • Vaping status: Never Used   Substance and Sexual Activity   • Alcohol use: Yes     Comment: social   • Drug use: No   • Sexual activity: Not on file   Other Topics Concern   • Not on file   Social History Narrative   • Not on file     Social Drivers of Health     Financial Resource Strain: Not on file   Food Insecurity: Not on file   Transportation Needs: Not on file   Physical Activity: Inactive (6/22/2019)    Exercise Vital Sign    • Days of Exercise per Week: 0 days    • Minutes of Exercise per Session: 0 min   Stress: No Stress Concern Present (5/1/2023)    [employer] [place] [employer] - Occupational Stress Questionnaire    • Feeling of Stress : Not at all   Social Connections: Unknown (6/18/2024)    Received from [employer]    Social Connections    • How often do you feel lonely or isolated from those around you? (Adult - for ages 18 years and over): Not on file   Intimate Partner Violence: Not on file   Housing Stability: Not on file       Family History   Problem Relation Age of Onset   • Hypertension Mother    • Heart attack Father         age 59.  was a heavy drinker.       Physical Exam:  Blood pressure 120/80, pulse 88, height 6' 1\" (1.854 m), weight 123 kg (271 lb), " "SpO2 95%.  Body mass index is 35.75 kg/m².  Wt Readings from Last 3 Encounters:   12/31/24 123 kg (271 lb)   10/09/24 120 kg (263 lb 12.8 oz)   08/27/24 120 kg (264 lb)     Physical Exam  Vitals reviewed.   Constitutional:       General: He is not in acute distress.     Appearance: He is not toxic-appearing.   Neck:      Comments: No JVD  Cardiovascular:      Rate and Rhythm: Normal rate and regular rhythm.      Heart sounds: Murmur (systolic at base) heard.      No friction rub. No gallop.   Pulmonary:      Breath sounds: Normal breath sounds. No wheezing, rhonchi or rales.   Musculoskeletal:      Comments: No edema   Neurological:      Mental Status: He is alert.         Labs & Results:  Lab Results   Component Value Date    SODIUM 141 12/07/2024    K 4.8 12/07/2024     12/07/2024    CO2 30 12/07/2024    BUN 20 12/07/2024    CREATININE 1.09 12/07/2024    GLUC 90 10/21/2024    CALCIUM 9.5 12/07/2024     No results found for: \"NTBNP\"       Thank you for the opportunity to participate in the care of this patient.    Mayco Nayak MD, [facility]  Staff Cardiologist  Director of Cardio-Oncology  [facility]  "

## 2024-12-31 ENCOUNTER — OFFICE VISIT (OUTPATIENT)
Dept: CARDIOLOGY CLINIC | Facility: CLINIC | Age: 54
End: 2024-12-31
Payer: COMMERCIAL

## 2024-12-31 VITALS
OXYGEN SATURATION: 95 % | DIASTOLIC BLOOD PRESSURE: 80 MMHG | BODY MASS INDEX: 35.92 KG/M2 | HEIGHT: 73 IN | SYSTOLIC BLOOD PRESSURE: 120 MMHG | WEIGHT: 271 LBS | HEART RATE: 88 BPM

## 2024-12-31 DIAGNOSIS — I31.39 PERICARDIAL EFFUSION: ICD-10-CM

## 2024-12-31 DIAGNOSIS — I51.9 RADIATION-INDUCED HEART DISEASE: ICD-10-CM

## 2024-12-31 DIAGNOSIS — E78.2 MIXED HYPERLIPIDEMIA: Primary | ICD-10-CM

## 2024-12-31 DIAGNOSIS — I10 PRIMARY HYPERTENSION: ICD-10-CM

## 2024-12-31 DIAGNOSIS — I50.20 HFREF (HEART FAILURE WITH REDUCED EJECTION FRACTION) (HCC): ICD-10-CM

## 2024-12-31 DIAGNOSIS — I25.10 CAD IN NATIVE ARTERY: ICD-10-CM

## 2024-12-31 DIAGNOSIS — Z92.21 STATUS POST ADMINISTRATION OF CARDIOTOXIC CHEMOTHERAPY: ICD-10-CM

## 2024-12-31 DIAGNOSIS — I25.10 CAD, MULTIPLE VESSEL: ICD-10-CM

## 2024-12-31 DIAGNOSIS — I25.5 ISCHEMIC CARDIOMYOPATHY: ICD-10-CM

## 2024-12-31 PROCEDURE — 99214 OFFICE O/P EST MOD 30 MIN: CPT | Performed by: INTERNAL MEDICINE

## 2025-02-07 ENCOUNTER — HOSPITAL ENCOUNTER (EMERGENCY)
Facility: HOSPITAL | Age: 55
Discharge: HOME | End: 2025-02-07
Attending: EMERGENCY MEDICINE
Payer: COMMERCIAL

## 2025-02-07 ENCOUNTER — HOSPITAL ENCOUNTER (EMERGENCY)
Facility: HOSPITAL | Age: 55
Discharge: HOME | End: 2025-02-07
Payer: COMMERCIAL

## 2025-02-07 ENCOUNTER — APPOINTMENT (EMERGENCY)
Dept: RADIOLOGY | Facility: HOSPITAL | Age: 55
End: 2025-02-07
Payer: COMMERCIAL

## 2025-02-07 VITALS
RESPIRATION RATE: 18 BRPM | OXYGEN SATURATION: 96 % | TEMPERATURE: 98.9 F | SYSTOLIC BLOOD PRESSURE: 184 MMHG | DIASTOLIC BLOOD PRESSURE: 98 MMHG | HEART RATE: 85 BPM

## 2025-02-07 VITALS
RESPIRATION RATE: 18 BRPM | DIASTOLIC BLOOD PRESSURE: 83 MMHG | TEMPERATURE: 97.6 F | OXYGEN SATURATION: 98 % | BODY MASS INDEX: 35.75 KG/M2 | SYSTOLIC BLOOD PRESSURE: 134 MMHG | WEIGHT: 271 LBS | HEART RATE: 92 BPM

## 2025-02-07 DIAGNOSIS — S61.419A HAND LACERATION: Primary | ICD-10-CM

## 2025-02-07 DIAGNOSIS — Z23 NEED FOR DIPHTHERIA-TETANUS-PERTUSSIS (TDAP) VACCINE: Primary | ICD-10-CM

## 2025-02-07 PROCEDURE — 90715 TDAP VACCINE 7 YRS/> IM: CPT

## 2025-02-07 PROCEDURE — 12001 RPR S/N/AX/GEN/TRNK 2.5CM/<: CPT

## 2025-02-07 PROCEDURE — 90471 IMMUNIZATION ADMIN: CPT

## 2025-02-07 PROCEDURE — 73130 X-RAY EXAM OF HAND: CPT

## 2025-02-07 PROCEDURE — 99284 EMERGENCY DEPT VISIT MOD MDM: CPT

## 2025-02-07 PROCEDURE — 99283 EMERGENCY DEPT VISIT LOW MDM: CPT

## 2025-02-07 PROCEDURE — 99282 EMERGENCY DEPT VISIT SF MDM: CPT

## 2025-02-07 RX ORDER — LIDOCAINE HYDROCHLORIDE 10 MG/ML
10 INJECTION, SOLUTION EPIDURAL; INFILTRATION; INTRACAUDAL; PERINEURAL ONCE
Status: COMPLETED | OUTPATIENT
Start: 2025-02-07 | End: 2025-02-07

## 2025-02-07 RX ADMIN — TETANUS TOXOID, REDUCED DIPHTHERIA TOXOID AND ACELLULAR PERTUSSIS VACCINE, ADSORBED 0.5 ML: 5; 2.5; 8; 8; 2.5 SUSPENSION INTRAMUSCULAR at 20:28

## 2025-02-07 RX ADMIN — LIDOCAINE HYDROCHLORIDE 10 ML: 10 INJECTION, SOLUTION EPIDURAL; INFILTRATION; INTRACAUDAL at 09:37

## 2025-02-07 NOTE — ED PROVIDER NOTES
Time reflects when diagnosis was documented in both MDM as applicable and the Disposition within this note       Time User Action Codes Description Comment    2/7/2025  9:59 AM Alejandra Gilbert Add [S61.419A] Hand laceration           ED Disposition       ED Disposition   Discharge    Condition   Stable    Date/Time   Fri Feb 7, 2025  9:59 AM    Comment   Marcelina Ascencio discharge to home/self care.                   Assessment & Plan       Medical Decision Making  54-year-old male presents today after he sustained 2 lacerations dorsal hand after a metal divider fell from his van.  Patient was lifting shelf in his car when he hit his hand. Patient on blood thinners.Wound inspected under direct bright light with good visualization. Area with linear laceration across soft tissue through adipose without exposure of muscle belly or tendon. No overt foreign body. Area hemostatic. Neurovascular exam congruent with above.     Plan  Sutures - see procedure note   Tdap    Patient tolerated procedure well.  Patient is neurovascularly intact after laceration repair.  Discussed with patient because he is on blood thinners there is a small hematoma under the skin which her body is usually good about reabsorbing blood.  Discussed patient this will most likely bruise.  Discussed to keep this area dry and clean for the next 24 hours and then tomorrow he can take the bandage off and wash area with soap and water.  Encourage patient to elevate arm as well as apply ice. Advised patient he should return to the ED or urgent care or primary care provider and get his sutures removed in 7 to 10 days.  Discussed signs that would indicate infection or any other concerning symptoms that would warrant patient to return to ED. Patient neurovascular;leta intact at discharge.       Reviewing patients chart and it appears patient had left before receiving his tetanus shot. I called patient back and discussed this with him and discussed he should  return to the ED for tetanus shot since his last tetanus status is unknown.     Amount and/or Complexity of Data Reviewed  Radiology: ordered.    Risk  Prescription drug management.        ED Course as of 02/07/25 1957 Fri Feb 07, 2025   0959 5 sutures total   0959 5-0        Medications   lidocaine (PF) (XYLOCAINE-MPF) 1 % injection 10 mL (10 mL Infiltration Given by Other 2/7/25 0937)       ED Risk Strat Scores                          SBIRT 22yo+      Flowsheet Row Most Recent Value   Initial Alcohol Screen: US AUDIT-C     1. How often do you have a drink containing alcohol? 0 Filed at: 02/07/2025 0909   2. How many drinks containing alcohol do you have on a typical day you are drinking?  0 Filed at: 02/07/2025 0909   3a. Male UNDER 65: How often do you have five or more drinks on one occasion? 0 Filed at: 02/07/2025 0909   Audit-C Score 0 Filed at: 02/07/2025 0909   PETE: How many times in the past year have you...    Used an illegal drug or used a prescription medication for non-medical reasons? Never Filed at: 02/07/2025 0909                            History of Present Illness       Chief Complaint   Patient presents with    Hand Injury     Pts right hand was smashed with a metal divider, pt presents with 2 lacerations and swelling noted. +BT.        Past Medical History:   Diagnosis Date    1st degree AV block 05/13/2024    CAD, multiple vessel 05/13/2024    Cardiomyopathy (HCC)     Chronic systolic heart failure (HCC) 04/23/2024    Class 2 obesity 05/13/2024    Hodgkin's lymphoma (HCC)     Hypercholesterolemia     Hyperlipidemia     Nephrolithiasis     Pericardial effusion 04/23/2024    Plantar fasciitis 08/28/2018    Pre-diabetes     Primary hypertension 04/23/2024    Radiation-induced heart disease 04/23/2024    RBBB 05/13/2024      Past Surgical History:   Procedure Laterality Date    BONE MARROW TRANSPLANT  01/01/1991 1991    CARDIAC CATHETERIZATION Left 5/7/2024    Procedure: Cardiac Left Heart  Cath;  Surgeon: Jeff Marcano MD;  Location: [facility];  Service: Cardiology    CARDIAC CATHETERIZATION N/A 5/7/2024    Procedure: Cardiac Coronary Angiogram;  Surgeon: Jeff Marcano MD;  Location: [facility];  Service: Cardiology    CARDIAC CATHETERIZATION  5/7/2024    Procedure: Cardiac catheterization;  Surgeon: Jeff Marcano MD;  Location: [facility];  Service: Cardiology      Family History   Problem Relation Age of Onset    Hypertension Mother     Heart attack Father         age 59.  was a heavy drinker.      Social History     Tobacco Use    Smoking status: Light Smoker     Types: Cigars    Smokeless tobacco: Never    Tobacco comments:     Patient smokes cigars 3-4 times a week   Vaping Use    Vaping status: Never Used   Substance Use Topics    Alcohol use: Yes     Comment: social    Drug use: No      E-Cigarette/Vaping    E-Cigarette Use Never User       E-Cigarette/Vaping Substances    Nicotine No     THC No     CBD No     Flavoring No     Other No     Unknown No       I have reviewed and agree with the history as documented.     54-year-old male presents today after he sustained 2 lacerations dorsal hand after a metal divider fell from his van.  Patient was lifting shelf in his car when he hit his hand. Patient on blood thinners. No other trauma.  Patient reports significant swelling and bleeding to his lacerations.  Denies any numbness or tingling.  Denies any pain out of proportion.  Denies head strike.  Patient does not know when his last tetanus was.      Hand Injury  Associated symptoms: no back pain and no fever        Review of Systems   Constitutional:  Negative for chills and fever.   HENT:  Negative for ear pain and sore throat.    Eyes:  Negative for pain and visual disturbance.   Respiratory:  Negative for cough and shortness of breath.    Cardiovascular:  Negative for chest pain and palpitations.   Gastrointestinal:  Negative for abdominal pain and  vomiting.   Genitourinary:  Negative for dysuria and hematuria.   Musculoskeletal:  Negative for arthralgias and back pain.   Skin:  Positive for wound. Negative for color change and rash.   Neurological:  Negative for seizures and syncope.   All other systems reviewed and are negative.          Objective       ED Triage Vitals [02/07/25 0908]   Temperature Pulse Blood Pressure Respirations SpO2 Patient Position - Orthostatic VS   98.9 °F (37.2 °C) 85 (!) 184/98 18 96 % Sitting      Temp Source Heart Rate Source BP Location FiO2 (%) Pain Score    Oral Monitor Left arm -- --      Vitals      Date and Time Temp Pulse SpO2 Resp BP Pain Score FACES Pain Rating User   02/07/25 0908 98.9 °F (37.2 °C) 85 96 % 18 184/98 -- -- RJ            Physical Exam  Vitals and nursing note reviewed.   Constitutional:       General: He is not in acute distress.     Appearance: He is well-developed.   HENT:      Head: Normocephalic and atraumatic.   Eyes:      Conjunctiva/sclera: Conjunctivae normal.   Cardiovascular:      Rate and Rhythm: Normal rate and regular rhythm.      Pulses:           Radial pulses are 2+ on the right side and 2+ on the left side.      Heart sounds: No murmur heard.  Pulmonary:      Effort: Pulmonary effort is normal. No respiratory distress.      Breath sounds: Normal breath sounds.   Abdominal:      Palpations: Abdomen is soft.      Tenderness: There is no abdominal tenderness.   Musculoskeletal:         General: No swelling.      Right hand: Swelling and laceration present. No tenderness. Normal range of motion. Normal strength. Normal sensation. There is no disruption of two-point discrimination. Normal capillary refill. Normal pulse.      Left hand: Normal.      Cervical back: Neck supple.      Comments: Face hematoma formation on the dorsal aspect of the right hand.  There are two 2 cm lacerations to the dorsal aspect of the right hand near the knuckle.  No tendon or bone exposure/involvement.   Skin:      "General: Skin is warm and dry.      Capillary Refill: Capillary refill takes less than 2 seconds.   Neurological:      Mental Status: He is alert.   Psychiatric:         Mood and Affect: Mood normal.         Results Reviewed       Procedure Component Value Units Date/Time    Comprehensive metabolic panel [[id number]]     Lab Status: No result Specimen: Blood     Lipase [[id number]]     Lab Status: No result Specimen: Blood     CBC and differential [[id number]]     Lab Status: No result Specimen: Blood             XR hand 3+ views RIGHT   Final Interpretation by Rey Melton DO (02/07 1045)      Prominent soft tissue swelling at the dorsal aspect of the hand without evidence of acute osseous abnormality. No evidence of radiopaque foreign body.      Scalloping of the ulnar aspect of the distal radius with underlying sclerosis. This can be seen in the setting of rheumatoid arthritis as well as osteoarthritis.         Computerized Assisted Algorithm (CAA) may have been used to analyze all applicable images.         Resident: Maximus Li I, the attending radiologist, have reviewed the images and agree with the final report above.      Workstation performed: [id number]             Universal Protocol:  procedure performed by consultantConsent: Verbal consent obtained.  Risks and benefits: risks, benefits and alternatives were discussed  Consent given by: patient  Time out: Immediately prior to procedure a \"time out\" was called to verify the correct patient, procedure, equipment, support staff and site/side marked as required.  Patient understanding: patient states understanding of the procedure being performed  Patient consent: the patient's understanding of the procedure matches consent given  Procedure consent: procedure consent matches procedure scheduled  Relevant documents: relevant documents present and verified  Test results: test results available and properly labeled  Site marked: the operative site " was marked  Radiology Images displayed and confirmed. If images not available, report reviewed: imaging studies available  Patient identity confirmed: verbally with patient  Laceration repair    Date/Time: 2/7/2025 9:20 AM    Performed by: Alejandra Gilbert PA-C  Authorized by: Alejandra Gilbert PA-C  Body area: upper extremity  Location details: right hand  Laceration length: 2 cm  Foreign bodies: no foreign bodies  Tendon involvement: none  Nerve involvement: none  Vascular damage: no  Anesthesia: local infiltration    Anesthesia:  Local Anesthetic: lidocaine 1% without epinephrine  Anesthetic total: 10 mL    Sedation:  Patient sedated: no      Wound Dehiscence:  Superficial Wound Dehiscence: simple closure      Procedure Details:  Preparation: Patient was prepped and draped in the usual sterile fashion.  Irrigation solution: saline  Irrigation method: syringe  Amount of cleaning: standard  Debridement: none  Degree of undermining: none  Skin closure: 4-0 nylon  Number of sutures: 5 (2 on lateral lac, 3 on medial lac)  Approximation: close  Approximation difficulty: simple  Dressing: antibiotic ointment, non-adhesive packing strip and gauze roll  Patient tolerance: patient tolerated the procedure well with no immediate complications          ED Medication and Procedure Management   Prior to Admission Medications   Prescriptions Last Dose Informant Patient Reported? Taking?   aspirin 81 mg chewable tablet  Self No No   Sig: Chew 1 tablet (81 mg total) daily   atorvastatin (LIPITOR) 80 mg tablet  Self No No   Sig: TAKE 1 TABLET BY MOUTH EVERY DAY   clopidogrel (PLAVIX) 75 mg tablet  Self No No   Sig: TAKE 1 TABLET BY MOUTH EVERY DAY   dapagliflozin (Farxiga) 10 MG tablet  Self No No   Sig: Take 1 tablet (10 mg total) by mouth daily   diltiazem (CARDIZEM CD) 120 mg 24 hr capsule  Self No No   Sig: Take 1 capsule (120 mg total) by mouth daily   metoprolol succinate (TOPROL-XL) 25 mg 24 hr tablet  Self No No   Sig:  TAKE 1 TABLET (25 MG TOTAL) BY MOUTH DAILY.   nitroglycerin (NITROSTAT) 0.4 mg SL tablet  Self No No   Sig: Place 1 tablet (0.4 mg total) under the tongue every 5 (five) minutes as needed for chest pain   Patient not taking: Reported on 12/31/2024   sacubitril-valsartan (Entresto) 24-26 MG TABS  Self No No   Sig: Take 1 tablet by mouth 2 (two) times a day      Facility-Administered Medications: None     Discharge Medication List as of 2/7/2025 10:06 AM        CONTINUE these medications which have NOT CHANGED    Details   aspirin 81 mg chewable tablet Chew 1 tablet (81 mg total) daily, Starting Tue 5/7/2024, Normal      atorvastatin (LIPITOR) 80 mg tablet TAKE 1 TABLET BY MOUTH EVERY DAY, Starting Tue 11/5/2024, Normal      clopidogrel (PLAVIX) 75 mg tablet TAKE 1 TABLET BY MOUTH EVERY DAY, Starting Tue 12/3/2024, Normal      dapagliflozin (Farxiga) 10 MG tablet Take 1 tablet (10 mg total) by mouth daily, Starting Thu 12/26/2024, Normal      diltiazem (CARDIZEM CD) 120 mg 24 hr capsule Take 1 capsule (120 mg total) by mouth daily, Starting Mon 11/4/2024, Normal      metoprolol succinate (TOPROL-XL) 25 mg 24 hr tablet TAKE 1 TABLET (25 MG TOTAL) BY MOUTH DAILY., Starting Tue 12/3/2024, Normal      nitroglycerin (NITROSTAT) 0.4 mg SL tablet Place 1 tablet (0.4 mg total) under the tongue every 5 (five) minutes as needed for chest pain, Starting Tue 5/7/2024, Normal      sacubitril-valsartan (Entresto) 24-26 MG TABS Take 1 tablet by mouth 2 (two) times a day, Starting Wed 10/9/2024, Normal             ED SEPSIS DOCUMENTATION   Time reflects when diagnosis was documented in both MDM as applicable and the Disposition within this note       Time User Action Codes Description Comment    2/7/2025  9:59 AM Alejandra Gilbert [S61.419A] Hand laceration                  Alejandra Gilbert PA-C  02/07/25 1957

## 2025-02-07 NOTE — DISCHARGE INSTRUCTIONS
You have been evaluated in the Emergency Department today for a laceration to your right hand. Your laceration was repaired in the ED with sutures. Please keep the area surrounding the laceration clean and dry for 24 hours.  You should have the sutures removed in 7-10 days by your primary care physician, or at your local urgent care or ER. If you develop redness or swelling at the site of your laceration please come back to the ER for a wound check.    We recommend you take 6 tylenol 650mg every 6 hours as needed for pain.     Please follow up with your primary care physician in 7-10 days for suture removal. You can also return to the ER or another urgent care facility for this service.    Return to the Emergency Department if you experience discharge from your laceration, redness around your laceration, warmth around your laceration, fever, vomiting, numbness, tingling, or any other concerning symptoms.

## 2025-02-08 NOTE — ED PROVIDER NOTES
Time reflects when diagnosis was documented in both MDM as applicable and the Disposition within this note       Time User Action Codes Description Comment    2/7/2025  8:31 PM Alejandra Gilbert Add [Z23] Need for diphtheria-tetanus-pertussis (Tdap) vaccine           ED Disposition       ED Disposition   Discharge    Condition   Stable    Date/Time   Fri Feb 7, 2025  8:31 PM    Comment   Marcelina Ascencio discharge to home/self care.                   Assessment & Plan       Medical Decision Making  Patient presents today again after I saw him earlier today for right hand laceration. As I was going through patients chart later in the day, I noticed the patient had left before Tdap administration. Patient tetanus status was unknown at the time of his laceration repair. Patient did hit his hand with metal. I called patient as soon as I realized he left without Tdap administration and instructed patient return to ED for booster. Patient well appearing     Patient with no complaints. Patient was given Tdap.     Patient stable for discharge. Prior to discharge, discharge instructions were discussed with patient at bedside. Patient was provided both verbal and written instructions. Patient is understanding of the discharge instructions and is agreeable to plan of care. Return precautions were discussed with patient bedside, patient verbalized understanding of signs and symptoms that would necessitate return to the ED. All questions were answered. Patient was comfortable with the plan of care and discharged to home.      Risk  Prescription drug management.             Medications   tetanus-diphtheria-acellular pertussis (BOOSTRIX) IM injection 0.5 mL (0.5 mL Intramuscular Given 2/7/25 2028)       ED Risk Strat Scores                                              History of Present Illness       Chief Complaint   Patient presents with    Medical Problem     Pt left earlier without getting his tetanus shot. Pt came back to receive  it.       Past Medical History:   Diagnosis Date    1st degree AV block 05/13/2024    CAD, multiple vessel 05/13/2024    Cardiomyopathy (HCC)     Chronic systolic heart failure (HCC) 04/23/2024    Class 2 obesity 05/13/2024    Hodgkin's lymphoma (HCC)     Hypercholesterolemia     Hyperlipidemia     Nephrolithiasis     Pericardial effusion 04/23/2024    Plantar fasciitis 08/28/2018    Pre-diabetes     Primary hypertension 04/23/2024    Radiation-induced heart disease 04/23/2024    RBBB 05/13/2024      Past Surgical History:   Procedure Laterality Date    BONE MARROW TRANSPLANT  01/01/1991 1991    CARDIAC CATHETERIZATION Left 5/7/2024    Procedure: Cardiac Left Heart Cath;  Surgeon: Jeff Marcano MD;  Location: [facility];  Service: Cardiology    CARDIAC CATHETERIZATION N/A 5/7/2024    Procedure: Cardiac Coronary Angiogram;  Surgeon: Jeff Marcano MD;  Location: [facility];  Service: Cardiology    CARDIAC CATHETERIZATION  5/7/2024    Procedure: Cardiac catheterization;  Surgeon: Jeff Marcano MD;  Location: [facility];  Service: Cardiology      Family History   Problem Relation Age of Onset    Hypertension Mother     Heart attack Father         age 59.  was a heavy drinker.      Social History     Tobacco Use    Smoking status: Light Smoker     Types: Cigars    Smokeless tobacco: Never    Tobacco comments:     Patient smokes cigars 3-4 times a week   Vaping Use    Vaping status: Never Used   Substance Use Topics    Alcohol use: Yes     Comment: social    Drug use: No      E-Cigarette/Vaping    E-Cigarette Use Never User       E-Cigarette/Vaping Substances    Nicotine No     THC No     CBD No     Flavoring No     Other No     Unknown No       I have reviewed and agree with the history as documented.     Patient presents today again after I saw him earlier today for right hand laceration. As I was going through patients chart later in the day, I noticed the patient had left before  Tdap administration. He is here for tdap. No complaints.           Review of Systems   Constitutional:  Negative for chills and fever.   HENT:  Negative for ear pain and sore throat.    Eyes:  Negative for pain and visual disturbance.   Respiratory:  Negative for cough and shortness of breath.    Cardiovascular:  Negative for chest pain and palpitations.   Gastrointestinal:  Negative for abdominal pain and vomiting.   Genitourinary:  Negative for dysuria and hematuria.   Musculoskeletal:  Negative for arthralgias and back pain.   Skin:  Positive for wound. Negative for color change and rash.   Neurological:  Negative for seizures and syncope.   All other systems reviewed and are negative.          Objective       ED Triage Vitals [02/07/25 2027]   Temperature Pulse Blood Pressure Respirations SpO2 Patient Position - Orthostatic VS   97.6 °F (36.4 °C) 92 134/83 18 98 % Sitting      Temp Source Heart Rate Source BP Location FiO2 (%) Pain Score    Oral Monitor Left arm -- --      Vitals      Date and Time Temp Pulse SpO2 Resp BP Pain Score FACES Pain Rating User   02/07/25 2027 97.6 °F (36.4 °C) 92 98 % 18 134/83 -- -- BS            Physical Exam  Vitals and nursing note reviewed.   Constitutional:       General: He is not in acute distress.     Appearance: He is well-developed.   HENT:      Head: Normocephalic and atraumatic.   Eyes:      Conjunctiva/sclera: Conjunctivae normal.   Cardiovascular:      Rate and Rhythm: Normal rate and regular rhythm.      Heart sounds: No murmur heard.  Pulmonary:      Effort: Pulmonary effort is normal. No respiratory distress.      Breath sounds: Normal breath sounds.   Abdominal:      Palpations: Abdomen is soft.      Tenderness: There is no abdominal tenderness.   Musculoskeletal:         General: No swelling.      Left hand: Normal.      Cervical back: Neck supple.      Comments: Right hand with bandage in place from earlier laceration repair. No signs of bleeding through bandage.     Skin:     General: Skin is warm and dry.      Capillary Refill: Capillary refill takes less than 2 seconds.   Neurological:      Mental Status: He is alert.   Psychiatric:         Mood and Affect: Mood normal.         Results Reviewed       None            No orders to display       Procedures    ED Medication and Procedure Management   Prior to Admission Medications   Prescriptions Last Dose Informant Patient Reported? Taking?   aspirin 81 mg chewable tablet  Self No No   Sig: Chew 1 tablet (81 mg total) daily   atorvastatin (LIPITOR) 80 mg tablet  Self No No   Sig: TAKE 1 TABLET BY MOUTH EVERY DAY   clopidogrel (PLAVIX) 75 mg tablet  Self No No   Sig: TAKE 1 TABLET BY MOUTH EVERY DAY   dapagliflozin (Farxiga) 10 MG tablet  Self No No   Sig: Take 1 tablet (10 mg total) by mouth daily   diltiazem (CARDIZEM CD) 120 mg 24 hr capsule  Self No No   Sig: Take 1 capsule (120 mg total) by mouth daily   metoprolol succinate (TOPROL-XL) 25 mg 24 hr tablet  Self No No   Sig: TAKE 1 TABLET (25 MG TOTAL) BY MOUTH DAILY.   nitroglycerin (NITROSTAT) 0.4 mg SL tablet  Self No No   Sig: Place 1 tablet (0.4 mg total) under the tongue every 5 (five) minutes as needed for chest pain   Patient not taking: Reported on 12/31/2024   sacubitril-valsartan (Entresto) 24-26 MG TABS  Self No No   Sig: Take 1 tablet by mouth 2 (two) times a day      Facility-Administered Medications: None     Discharge Medication List as of 2/7/2025  8:32 PM        CONTINUE these medications which have NOT CHANGED    Details   aspirin 81 mg chewable tablet Chew 1 tablet (81 mg total) daily, Starting Tue 5/7/2024, Normal      atorvastatin (LIPITOR) 80 mg tablet TAKE 1 TABLET BY MOUTH EVERY DAY, Starting Tue 11/5/2024, Normal      clopidogrel (PLAVIX) 75 mg tablet TAKE 1 TABLET BY MOUTH EVERY DAY, Starting Tue 12/3/2024, Normal      dapagliflozin (Farxiga) 10 MG tablet Take 1 tablet (10 mg total) by mouth daily, Starting Thu 12/26/2024, Normal      diltiazem  (CARDIZEM CD) 120 mg 24 hr capsule Take 1 capsule (120 mg total) by mouth daily, Starting Mon 11/4/2024, Normal      metoprolol succinate (TOPROL-XL) 25 mg 24 hr tablet TAKE 1 TABLET (25 MG TOTAL) BY MOUTH DAILY., Starting Tue 12/3/2024, Normal      nitroglycerin (NITROSTAT) 0.4 mg SL tablet Place 1 tablet (0.4 mg total) under the tongue every 5 (five) minutes as needed for chest pain, Starting Tue 5/7/2024, Normal      sacubitril-valsartan (Entresto) 24-26 MG TABS Take 1 tablet by mouth 2 (two) times a day, Starting Wed 10/9/2024, Normal           No discharge procedures on file.  ED SEPSIS DOCUMENTATION   Time reflects when diagnosis was documented in both MDM as applicable and the Disposition within this note       Time User Action Codes Description Comment    2/7/2025  8:31 PM Alejandra Gilbert Add [Z23] Need for diphtheria-tetanus-pertussis (Tdap) vaccine                  Alejandra Gilbert PA-C  02/07/25 0437

## 2025-04-24 ENCOUNTER — OFFICE VISIT (OUTPATIENT)
Dept: FAMILY MEDICINE CLINIC | Facility: CLINIC | Age: 55
End: 2025-04-24
Payer: COMMERCIAL

## 2025-04-24 VITALS
SYSTOLIC BLOOD PRESSURE: 110 MMHG | BODY MASS INDEX: 36.08 KG/M2 | HEART RATE: 81 BPM | HEIGHT: 73 IN | DIASTOLIC BLOOD PRESSURE: 70 MMHG | WEIGHT: 272.2 LBS | OXYGEN SATURATION: 94 %

## 2025-04-24 DIAGNOSIS — Z00.00 ANNUAL PHYSICAL EXAM: ICD-10-CM

## 2025-04-24 DIAGNOSIS — I50.20 HFREF (HEART FAILURE WITH REDUCED EJECTION FRACTION) (HCC): ICD-10-CM

## 2025-04-24 DIAGNOSIS — E78.2 MIXED HYPERLIPIDEMIA: ICD-10-CM

## 2025-04-24 DIAGNOSIS — R73.01 IFG (IMPAIRED FASTING GLUCOSE): ICD-10-CM

## 2025-04-24 DIAGNOSIS — C81.90 HODGKIN LYMPHOMA, UNSPECIFIED HODGKIN LYMPHOMA TYPE, UNSPECIFIED BODY REGION (HCC): ICD-10-CM

## 2025-04-24 DIAGNOSIS — Z12.5 SCREENING PSA (PROSTATE SPECIFIC ANTIGEN): ICD-10-CM

## 2025-04-24 DIAGNOSIS — I25.10 CAD, MULTIPLE VESSEL: ICD-10-CM

## 2025-04-24 DIAGNOSIS — I10 PRIMARY HYPERTENSION: ICD-10-CM

## 2025-04-24 DIAGNOSIS — I74.2 EMBOLISM AND THROMBOSIS OF ARTERIES OF THE UPPER EXTREMITIES (HCC): ICD-10-CM

## 2025-04-24 DIAGNOSIS — Z76.89 ENCOUNTER TO ESTABLISH CARE: ICD-10-CM

## 2025-04-24 DIAGNOSIS — L72.3 SEBACEOUS CYST: ICD-10-CM

## 2025-04-24 DIAGNOSIS — R09.81 CHRONIC NASAL CONGESTION: Primary | ICD-10-CM

## 2025-04-24 DIAGNOSIS — I42.9 CARDIOMYOPATHY, UNSPECIFIED TYPE (HCC): ICD-10-CM

## 2025-04-24 DIAGNOSIS — Z00.00 HEALTHCARE MAINTENANCE: ICD-10-CM

## 2025-04-24 DIAGNOSIS — I50.22 CHRONIC SYSTOLIC HEART FAILURE (HCC): ICD-10-CM

## 2025-04-24 PROBLEM — Z85.71 HISTORY OF HODGKIN'S DISEASE: Status: ACTIVE | Noted: 2025-04-24

## 2025-04-24 PROCEDURE — 99386 PREV VISIT NEW AGE 40-64: CPT | Performed by: NURSE PRACTITIONER

## 2025-04-24 PROCEDURE — 99214 OFFICE O/P EST MOD 30 MIN: CPT | Performed by: NURSE PRACTITIONER

## 2025-04-24 NOTE — ASSESSMENT & PLAN NOTE
Wt Readings from Last 3 Encounters:   04/24/25 123 kg (272 lb 3.2 oz)   02/07/25 123 kg (271 lb)   12/31/24 123 kg (271 lb)       Continue care with cardiology.

## 2025-04-24 NOTE — PROGRESS NOTES
Adult Annual Physical  Name: Marcelina Ascencio      : 1970      MRN: [id number]  Encounter Provider: TWIN Reyes  Encounter Date: 2025   Encounter department: [facility] [address]    :  Assessment & Plan  Chronic nasal congestion  Will refer to ENT for further evaluation.  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       HFrEF (heart failure with reduced ejection fraction) (HCC)  Wt Readings from Last 3 Encounters:   25 123 kg (272 lb 3.2 oz)   25 123 kg (271 lb)   24 123 kg (271 lb)       Continue care with cardiology.             Hodgkin lymphoma, unspecified Hodgkin lymphoma type, unspecified body region (HCC)         Screening PSA (prostate specific antigen)    Orders:  •  PSA, Total Screen; Future    Healthcare maintenance    Orders:  •  CBC and differential; Future  •  Comprehensive metabolic panel; Future  •  Hemoglobin A1C; Future  •  Lipid panel; Future  •  TSH, 3rd generation with Free T4 reflex; Future    IFG (impaired fasting glucose)    Orders:  •  Hemoglobin A1C; Future    Sebaceous cyst    Orders:  •  Ambulatory Referral to General Surgery; Future    CAD, multiple vessel  Continue care with cardiology.  Continue on statin and metoprolol.       Cardiomyopathy, unspecified type (HCC)         Chronic systolic heart failure (HCC)  Wt Readings from Last 3 Encounters:   25 123 kg (272 lb 3.2 oz)   25 123 kg (271 lb)   24 123 kg (271 lb)                    Primary hypertension  Very well-managed with current meds.       Mixed hyperlipidemia  Advised to obtain current fasting lipid panel.       Annual physical exam         Encounter to establish care                 Immunizations:  - Immunizations due: Influenza, Prevnar 20 and Zoster (Shingrix)         History of Present Illness     Adult Annual  Physical:  Patient presents for annual physical. Patient presents to \[facility]\"" care and for annual physical.  Patient is status post CABG x 3 in 2024.  Having trouble with left nostril, during the day is fine. He noticed a cyst on his back and keeps going. This has been there for about a year or two. .     Diet and Physical Activity:  - Diet/Nutrition: well balanced diet.  - Exercise: no formal exercise.    Depression Screening:  - PHQ-2 Score: 0    General Health:  - Sleep: sleeps well.  - Hearing: normal hearing bilateral ears.  - Vision: wears contacts and most recent eye exam < 1 year ago.  - Dental: regular dental visits.    Review of Systems   Constitutional:  Negative for chills, diaphoresis and fever.   HENT:  Negative for ear pain and sore throat.    Eyes:  Negative for pain and visual disturbance.   Respiratory:  Negative for cough and shortness of breath.    Cardiovascular:  Negative for chest pain and palpitations.   Gastrointestinal:  Negative for abdominal pain and vomiting.   Genitourinary:  Negative for dysuria and hematuria.   Musculoskeletal:  Negative for arthralgias and back pain.   Skin:  Negative for color change and rash.        Cyst on back   Neurological:  Negative for seizures and syncope.   Psychiatric/Behavioral:  Negative for dysphoric mood and sleep disturbance. The patient is not nervous/anxious.    All other systems reviewed and are negative.    Current Outpatient Medications on File Prior to Visit   Medication Sig Dispense Refill   • aspirin 81 mg chewable tablet Chew 1 tablet (81 mg total) daily 30 tablet 5   • atorvastatin (LIPITOR) 80 mg tablet TAKE 1 TABLET BY MOUTH EVERY DAY 90 tablet 1   • clopidogrel (PLAVIX) 75 mg tablet TAKE 1 TABLET BY MOUTH EVERY DAY 90 tablet 1   • dapagliflozin (Farxiga) 10 MG tablet Take 1 tablet (10 mg total) by mouth daily 90 tablet 1   • diltiazem (CARDIZEM CD) 120 mg 24 hr capsule Take 1 capsule (120 mg total) by mouth daily 30 capsule 5   •  "metoprolol succinate (TOPROL-XL) 25 mg 24 hr tablet TAKE 1 TABLET (25 MG TOTAL) BY MOUTH DAILY. 90 tablet 1   • sacubitril-valsartan (Entresto) 24-26 MG TABS Take 1 tablet by mouth 2 (two) times a day 60 tablet 11   • nitroglycerin (NITROSTAT) 0.4 mg SL tablet Place 1 tablet (0.4 mg total) under the tongue every 5 (five) minutes as needed for chest pain (Patient not taking: Reported on 12/31/2024) 25 tablet 5     No current facility-administered medications on file prior to visit.        Objective   /70 (BP Location: Left arm, Patient Position: Sitting, Cuff Size: Large)   Pulse 81   Ht 6' 1\" (1.854 m)   Wt 123 kg (272 lb 3.2 oz)   SpO2 94%   BMI 35.91 kg/m²     Physical Exam  Vitals and nursing note reviewed.   Constitutional:       General: He is not in acute distress.     Appearance: He is well-developed.   HENT:      Head: Normocephalic and atraumatic.      Right Ear: Tympanic membrane normal.      Left Ear: Tympanic membrane normal.      Nose: No congestion.   Eyes:      Conjunctiva/sclera: Conjunctivae normal.   Cardiovascular:      Rate and Rhythm: Normal rate and regular rhythm.      Heart sounds: Murmur heard.   Pulmonary:      Effort: Pulmonary effort is normal. No respiratory distress.      Breath sounds: Normal breath sounds.   Abdominal:      Palpations: Abdomen is soft.      Tenderness: There is no abdominal tenderness.   Musculoskeletal:         General: No swelling.      Cervical back: Neck supple.   Skin:     General: Skin is warm and dry.      Capillary Refill: Capillary refill takes less than 2 seconds.          Neurological:      Mental Status: He is alert and oriented to person, place, and time.   Psychiatric:         Mood and Affect: Mood normal.       "

## 2025-04-24 NOTE — ASSESSMENT & PLAN NOTE
Wt Readings from Last 3 Encounters:   04/24/25 123 kg (272 lb 3.2 oz)   02/07/25 123 kg (271 lb)   12/31/24 123 kg (271 lb)

## 2025-04-25 DIAGNOSIS — I25.10 CAD, MULTIPLE VESSEL: ICD-10-CM

## 2025-04-25 DIAGNOSIS — E78.2 MIXED HYPERLIPIDEMIA: ICD-10-CM

## 2025-04-25 RX ORDER — ATORVASTATIN CALCIUM 80 MG/1
80 TABLET, FILM COATED ORAL DAILY
Qty: 30 TABLET | Refills: 5 | Status: SHIPPED | OUTPATIENT
Start: 2025-04-25

## 2025-04-25 RX ORDER — DILTIAZEM HYDROCHLORIDE 120 MG/1
120 CAPSULE, COATED, EXTENDED RELEASE ORAL DAILY
Qty: 30 CAPSULE | Refills: 5 | Status: SHIPPED | OUTPATIENT
Start: 2025-04-25

## 2025-05-08 ENCOUNTER — APPOINTMENT (OUTPATIENT)
Dept: LAB | Facility: CLINIC | Age: 55
End: 2025-05-08
Attending: INTERNAL MEDICINE
Payer: COMMERCIAL

## 2025-05-08 ENCOUNTER — RESULTS FOLLOW-UP (OUTPATIENT)
Dept: CARDIOLOGY CLINIC | Facility: CLINIC | Age: 55
End: 2025-05-08

## 2025-05-08 DIAGNOSIS — R73.01 IFG (IMPAIRED FASTING GLUCOSE): ICD-10-CM

## 2025-05-08 DIAGNOSIS — I25.10 CAD, MULTIPLE VESSEL: ICD-10-CM

## 2025-05-08 DIAGNOSIS — E78.2 MIXED HYPERLIPIDEMIA: ICD-10-CM

## 2025-05-08 DIAGNOSIS — Z00.00 HEALTHCARE MAINTENANCE: ICD-10-CM

## 2025-05-08 DIAGNOSIS — Z12.5 SCREENING PSA (PROSTATE SPECIFIC ANTIGEN): ICD-10-CM

## 2025-05-08 LAB
ALBUMIN SERPL BCG-MCNC: 4.6 G/DL (ref 3.5–5)
ALP SERPL-CCNC: 87 U/L (ref 34–104)
ALT SERPL W P-5'-P-CCNC: 15 U/L (ref 7–52)
ANION GAP SERPL CALCULATED.3IONS-SCNC: 6 MMOL/L (ref 4–13)
AST SERPL W P-5'-P-CCNC: 13 U/L (ref 13–39)
BASOPHILS # BLD AUTO: 0.04 THOUSANDS/ÂΜL (ref 0–0.1)
BASOPHILS NFR BLD AUTO: 1 % (ref 0–1)
BILIRUB SERPL-MCNC: 0.63 MG/DL (ref 0.2–1)
BUN SERPL-MCNC: 21 MG/DL (ref 5–25)
CALCIUM SERPL-MCNC: 9.4 MG/DL (ref 8.4–10.2)
CHLORIDE SERPL-SCNC: 105 MMOL/L (ref 96–108)
CHOLEST SERPL-MCNC: 132 MG/DL (ref ?–200)
CO2 SERPL-SCNC: 28 MMOL/L (ref 21–32)
CREAT SERPL-MCNC: 1.12 MG/DL (ref 0.6–1.3)
EOSINOPHIL # BLD AUTO: 0.09 THOUSAND/ÂΜL (ref 0–0.61)
EOSINOPHIL NFR BLD AUTO: 2 % (ref 0–6)
ERYTHROCYTE [DISTWIDTH] IN BLOOD BY AUTOMATED COUNT: 13.2 % (ref 11.6–15.1)
EST. AVERAGE GLUCOSE BLD GHB EST-MCNC: 143 MG/DL
GFR SERPL CREATININE-BSD FRML MDRD: 74 ML/MIN/1.73SQ M
GLUCOSE P FAST SERPL-MCNC: 116 MG/DL (ref 65–99)
HBA1C MFR BLD: 6.6 %
HCT VFR BLD AUTO: 41.4 % (ref 36.5–49.3)
HDLC SERPL-MCNC: 49 MG/DL
HGB BLD-MCNC: 13.2 G/DL (ref 12–17)
IMM GRANULOCYTES # BLD AUTO: 0.02 THOUSAND/UL (ref 0–0.2)
IMM GRANULOCYTES NFR BLD AUTO: 0 % (ref 0–2)
LDLC SERPL CALC-MCNC: 53 MG/DL (ref 0–100)
LYMPHOCYTES # BLD AUTO: 1.16 THOUSANDS/ÂΜL (ref 0.6–4.47)
LYMPHOCYTES NFR BLD AUTO: 24 % (ref 14–44)
MCH RBC QN AUTO: 30 PG (ref 26.8–34.3)
MCHC RBC AUTO-ENTMCNC: 31.9 G/DL (ref 31.4–37.4)
MCV RBC AUTO: 94 FL (ref 82–98)
MONOCYTES # BLD AUTO: 0.51 THOUSAND/ÂΜL (ref 0.17–1.22)
MONOCYTES NFR BLD AUTO: 10 % (ref 4–12)
NEUTROPHILS # BLD AUTO: 3.12 THOUSANDS/ÂΜL (ref 1.85–7.62)
NEUTS SEG NFR BLD AUTO: 63 % (ref 43–75)
NONHDLC SERPL-MCNC: 83 MG/DL
NRBC BLD AUTO-RTO: 0 /100 WBCS
PLATELET # BLD AUTO: 271 THOUSANDS/UL (ref 149–390)
PMV BLD AUTO: 10.7 FL (ref 8.9–12.7)
POTASSIUM SERPL-SCNC: 4.6 MMOL/L (ref 3.5–5.3)
PROT SERPL-MCNC: 7.5 G/DL (ref 6.4–8.4)
PSA SERPL-MCNC: 1.1 NG/ML (ref 0–4)
RBC # BLD AUTO: 4.4 MILLION/UL (ref 3.88–5.62)
SODIUM SERPL-SCNC: 139 MMOL/L (ref 135–147)
T4 FREE SERPL-MCNC: 0.57 NG/DL (ref 0.61–1.12)
TRIGL SERPL-MCNC: 149 MG/DL (ref ?–150)
TSH SERPL DL<=0.05 MIU/L-ACNC: 7.14 UIU/ML (ref 0.45–4.5)
WBC # BLD AUTO: 4.94 THOUSAND/UL (ref 4.31–10.16)

## 2025-05-08 PROCEDURE — 83036 HEMOGLOBIN GLYCOSYLATED A1C: CPT

## 2025-05-08 PROCEDURE — 84443 ASSAY THYROID STIM HORMONE: CPT

## 2025-05-08 PROCEDURE — 85025 COMPLETE CBC W/AUTO DIFF WBC: CPT

## 2025-05-08 PROCEDURE — G0103 PSA SCREENING: HCPCS

## 2025-05-08 PROCEDURE — 80053 COMPREHEN METABOLIC PANEL: CPT

## 2025-05-08 PROCEDURE — 36415 COLL VENOUS BLD VENIPUNCTURE: CPT

## 2025-05-08 PROCEDURE — 80061 LIPID PANEL: CPT

## 2025-05-08 PROCEDURE — 84439 ASSAY OF FREE THYROXINE: CPT

## 2025-05-09 ENCOUNTER — RESULTS FOLLOW-UP (OUTPATIENT)
Dept: FAMILY MEDICINE CLINIC | Facility: CLINIC | Age: 55
End: 2025-05-09

## 2025-05-09 NOTE — RESULT ENCOUNTER NOTE
Lipids -  LDL 53  CMP - stable  A1c  6.6   March 7, 2023      Omid Adorno  [address] [place] [address]        Dear MsDakotaKyree,    We are writing to inform you of your test results.    Your test results fall within the expected range(s) or remain unchanged from previous results.  Please continue with current treatment plan.  Call patient:  Cholesterol levels and lab tests are stable.  No changes to medications based on blood work.     Resulted Orders   Hemoglobin   Result Value Ref Range    Hemoglobin 10.5 (L) 11.7 - 15.7 g/dL   Hemoglobin A1c   Result Value Ref Range    Hemoglobin A1C 7.5 (H) 0.0 - 5.6 %      Comment:      Normal <5.7%   Prediabetes 5.7-6.4%    Diabetes 6.5% or higher     Note: Adopted from ADA consensus guidelines.   Alkaline phosphatase   Result Value Ref Range    Alkaline Phosphatase 186 (H) 35 - 104 U/L   Lipid Profile   Result Value Ref Range    Cholesterol 130 <200 mg/dL    Triglycerides 102 <150 mg/dL    Direct Measure HDL 47 (L) >=50 mg/dL    LDL Cholesterol Calculated 63 <=100 mg/dL    Non HDL Cholesterol 83 <130 mg/dL    Narrative    Cholesterol  Desirable:  <200 mg/dL    Triglycerides  Normal:  Less than 150 mg/dL  Borderline High:  150-199 mg/dL  High:  200-499 mg/dL  Very High:  Greater than or equal to 500 mg/dL    Direct Measure HDL  Female:  Greater than or equal to 50 mg/dL   Male:  Greater than or equal to 40 mg/dL    LDL Cholesterol  Desirable:  <100mg/dL  Above Desirable:  100-129 mg/dL   Borderline High:  130-159 mg/dL   High:  160-189 mg/dL   Very High:  >= 190 mg/dL    Non HDL Cholesterol  Desirable:  130 mg/dL  Above Desirable:  130-159 mg/dL  Borderline High:  160-189 mg/dL  High:  190-219 mg/dL  Very High:  Greater than or equal to 220 mg/dL   Parathyroid Hormone Intact   Result Value Ref Range    Parathyroid Hormone Intact 31 15 - 65 pg/mL    Narrative    This result was obtained with the Roche Elecsys PTH STAT assay.   This reference range differs from PTH assays used in other [facility]  laboratories.   Phosphorus   Result Value Ref Range    Phosphorus 4.0 2.5 - 4.5 mg/dL       If you have any questions or concerns, please call the clinic at the number listed above.       Sincerely,      Shannan eBnson MD

## 2025-05-16 ENCOUNTER — TELEMEDICINE (OUTPATIENT)
Dept: FAMILY MEDICINE CLINIC | Facility: CLINIC | Age: 55
End: 2025-05-16
Payer: COMMERCIAL

## 2025-05-16 DIAGNOSIS — R73.03 PREDIABETES: ICD-10-CM

## 2025-05-16 DIAGNOSIS — E03.9 ACQUIRED HYPOTHYROIDISM: Primary | ICD-10-CM

## 2025-05-16 PROCEDURE — 99214 OFFICE O/P EST MOD 30 MIN: CPT | Performed by: NURSE PRACTITIONER

## 2025-05-16 RX ORDER — LEVOTHYROXINE SODIUM 25 UG/1
25 TABLET ORAL
Qty: 100 TABLET | Refills: 3 | Status: SHIPPED | OUTPATIENT
Start: 2025-05-16

## 2025-05-16 NOTE — PROGRESS NOTES
Virtual Regular VisitName: Marcelina Ascencio      : 1970      MRN: [id number]  Encounter Provider: TWIN Reyes  Encounter Date: 2025   Encounter department: [facility] [address]  :  Assessment & Plan  Acquired hypothyroidism  Will start on levothyroxine daily and repeat labs in 6 weeks. Patient agreed to plan. We did discuss administration of levothyroxine.   Orders:    TSH, 3rd generation with Free T4 reflex; Future    levothyroxine 25 mcg tablet; Take 1 tablet (25 mcg total) by mouth daily in the early morning    Prediabetes  Discussed results with patient. Discussed low carb diet and exercise. Will repeat in 6 months.        Acquired hypothyroidism               History of Present Illness     Patient presents to review labs.       Review of Systems   Constitutional:  Negative for chills and fever.   HENT:  Negative for ear pain and sore throat.    Eyes:  Negative for pain and visual disturbance.   Respiratory:  Negative for cough and shortness of breath.    Cardiovascular:  Negative for chest pain and palpitations.   Gastrointestinal:  Negative for abdominal pain and vomiting.   Genitourinary:  Negative for dysuria and hematuria.   Musculoskeletal:  Negative for arthralgias and back pain.   Skin:  Negative for color change and rash.   Neurological:  Negative for seizures and syncope.   All other systems reviewed and are negative.      Objective   There were no vitals taken for this visit.    Physical Exam  Constitutional:       Appearance: Normal appearance.   Pulmonary:      Effort: Pulmonary effort is normal. No respiratory distress.     Neurological:      Mental Status: He is alert and oriented to person, place, and time.         Administrative Statements   Encounter provider TWIN Reyes    The Patient is located at Home and in the following state in which I hold an active license [place].    The patient was identified by name and date of birth. Marcelina Ascencio  was informed that this is a telemedicine visit and that the visit is being conducted through the Epic Embedded platform. He agrees to proceed..  My office door was closed. No one else was in the room.  He acknowledged consent and understanding of privacy and security of the video platform. The patient has agreed to participate and understands they can discontinue the visit at any time.    I have spent a total time of 15 minutes in caring for this patient on the day of the visit/encounter including Documenting in the medical record, Reviewing/placing orders in the medical record (including tests, medications, and/or procedures), and Obtaining or reviewing history  , not including the time spent for establishing the audio/video connection.

## 2025-05-19 ENCOUNTER — HOSPITAL ENCOUNTER (OUTPATIENT)
Facility: HOSPITAL | Age: 55
Setting detail: OUTPATIENT SURGERY
End: 2025-05-19
Attending: STUDENT IN AN ORGANIZED HEALTH CARE EDUCATION/TRAINING PROGRAM | Admitting: STUDENT IN AN ORGANIZED HEALTH CARE EDUCATION/TRAINING PROGRAM
Payer: COMMERCIAL

## 2025-05-19 ENCOUNTER — CONSULT (OUTPATIENT)
Dept: SURGERY | Facility: CLINIC | Age: 55
End: 2025-05-19
Payer: COMMERCIAL

## 2025-05-19 VITALS
TEMPERATURE: 97.6 F | RESPIRATION RATE: 18 BRPM | WEIGHT: 273.2 LBS | SYSTOLIC BLOOD PRESSURE: 132 MMHG | BODY MASS INDEX: 36.21 KG/M2 | HEART RATE: 78 BPM | OXYGEN SATURATION: 97 % | DIASTOLIC BLOOD PRESSURE: 78 MMHG | HEIGHT: 73 IN

## 2025-05-19 DIAGNOSIS — L72.3 SEBACEOUS CYST: Primary | ICD-10-CM

## 2025-05-19 PROCEDURE — 99244 OFF/OP CNSLTJ NEW/EST MOD 40: CPT | Performed by: STUDENT IN AN ORGANIZED HEALTH CARE EDUCATION/TRAINING PROGRAM

## 2025-05-19 RX ORDER — CHLORHEXIDINE GLUCONATE 40 MG/ML
SOLUTION TOPICAL DAILY PRN
OUTPATIENT
Start: 2025-05-19

## 2025-05-19 NOTE — PROGRESS NOTES
Name: Marcelina Ascencio      : 1970      MRN: [id number]  Encounter Provider: Adam Her DO  Encounter Date: 2025   Encounter department: [facility]  :  Assessment & Plan  Sebaceous cyst  55-year-old male with sebaceous cyst of back  -See HPI  - CBC and CMP from 2025 reviewed  -Hemoglobin A1c from 2025 reviewed  - Primary care physician note from 2025 reviewed  -Cardiology note from 2024 reviewed, recommendation patient is on dual antiplatelet therapy for total of 1 year after CABG  - Plan for excision of sebaceous cyst under local anesthesia  - Discussed with patient that since this is a local case and superficial I am okay if he is still taking both aspirin and Plavix during the surgery  - He has an appointment with his Cardiologist tomorrow    All risks, benefits, alternatives of the procedure were discussed at length with the patient.  Risks include bleeding, infection, damage to surrounding structures, recurrence.  All questions were answered to satisfaction.  The patient voiced understanding and signed consent.    Orders:    Case request operating room: EXCISION  BIOPSY LESION/MASS BACK; Standing        History of Present Illness   HPI  Marcelina Ascencio is a 55 y.o. male who presents for evaluation of a sebaceous cyst of back.  He states he has had a small bump on his back for some time.  He notes it is slowly growing.  Denies any pain.  Denies any infection or drainage.  History obtained from: patient    Review of Systems   Constitutional:  Negative for chills, fatigue and fever.   HENT:  Negative for congestion, hearing loss, rhinorrhea and sore throat.    Eyes:  Negative for pain and discharge.   Respiratory:  Negative for cough, chest tightness and shortness of breath.    Cardiovascular:  Negative for chest pain and palpitations.   Gastrointestinal:  Negative for abdominal pain, constipation, diarrhea, nausea and vomiting.   Endocrine: Negative for cold  intolerance and heat intolerance.   Genitourinary:  Negative for difficulty urinating and dysuria.   Musculoskeletal:  Negative for back pain and neck pain.   Skin:  Negative for color change and rash.        Positive sebaceous cyst of back   Allergic/Immunologic: Positive for environmental allergies. Negative for food allergies.   Neurological:  Negative for seizures and headaches.   Hematological:  Does not bruise/bleed easily.   Psychiatric/Behavioral:  Negative for confusion and hallucinations.      Medical History Reviewed by provider this encounter:  Tobacco  Allergies  Meds  Problems  Med Hx  Surg Hx  Fam Hx     .  Past Medical History   Past Medical History:   Diagnosis Date    1st degree AV block 05/13/2024    CAD, multiple vessel 05/13/2024    Cardiomyopathy (HCC)     Chronic systolic heart failure (HCC) 04/23/2024    Class 2 obesity 05/13/2024    Hodgkin's lymphoma (HCC)     Hypercholesterolemia     Hyperlipidemia     Nephrolithiasis     Pericardial effusion 04/23/2024    Plantar fasciitis 08/28/2018    Pre-diabetes     Primary hypertension 04/23/2024    Radiation-induced heart disease 04/23/2024    RBBB 05/13/2024     Past Surgical History:   Procedure Laterality Date    BONE MARROW TRANSPLANT  01/01/1991 1991    CARDIAC CATHETERIZATION Left 05/07/2024    Procedure: Cardiac Left Heart Cath;  Surgeon: Jeff Marcano MD;  Location: [facility];  Service: Cardiology    CARDIAC CATHETERIZATION N/A 05/07/2024    Procedure: Cardiac Coronary Angiogram;  Surgeon: Jeff Marcano MD;  Location: [facility];  Service: Cardiology    CARDIAC CATHETERIZATION  05/07/2024    Procedure: Cardiac catheterization;  Surgeon: Jeff Marcano MD;  Location: [facility];  Service: Cardiology    COLONOSCOPY      WISDOM TOOTH EXTRACTION       Family History   Problem Relation Age of Onset    Hyperlipidemia Mother     Hypertension Mother     Heart attack Father         age 59.  was a heavy  "drinker.      reports that he has been smoking cigars. He has never been exposed to tobacco smoke. He has never used smokeless tobacco. He reports current alcohol use. He reports that he does not use drugs.  Current Outpatient Medications   Medication Instructions    aspirin 81 mg, Oral, Daily    atorvastatin (LIPITOR) 80 mg, Oral, Daily    clopidogrel (PLAVIX) 75 mg, Oral, Daily    dapagliflozin (FARXIGA) 10 mg, Oral, Daily    diltiazem (CARDIZEM CD) 120 mg, Oral, Daily    levothyroxine 25 mcg, Oral, Daily (early morning)    metoprolol succinate (TOPROL-XL) 25 mg, Oral, Daily    nitroglycerin (NITROSTAT) 0.4 mg, Sublingual, Every 5 minutes PRN    sacubitril-valsartan (Entresto) 24-26 MG TABS 1 tablet, Oral, 2 times daily   Allergies[1]   Medications Ordered Prior to Encounter[2]   Social History     Tobacco Use    Smoking status: Light Smoker     Types: Cigars     Passive exposure: Never    Smokeless tobacco: Never    Tobacco comments:     Patient smokes cigars 3-4 times a week   Vaping Use    Vaping status: Never Used   Substance and Sexual Activity    Alcohol use: Yes     Comment: social    Drug use: No    Sexual activity: Yes        Objective   /78 (BP Location: Left arm, Patient Position: Sitting, Cuff Size: Standard)   Pulse 78   Temp 97.6 °F (36.4 °C)   Resp 18   Ht 6' 1\" (1.854 m)   Wt 124 kg (273 lb 3.2 oz)   SpO2 97%   BMI 36.04 kg/m²      Physical Exam  Constitutional:       Appearance: Normal appearance.   HENT:      Head: Normocephalic and atraumatic.      Nose: Nose normal.     Eyes:      General: No scleral icterus.     Conjunctiva/sclera: Conjunctivae normal.       Cardiovascular:      Rate and Rhythm: Normal rate and regular rhythm.      Heart sounds: Normal heart sounds.   Pulmonary:      Effort: Pulmonary effort is normal.      Breath sounds: Normal breath sounds.     Musculoskeletal:         General: No signs of injury.     Skin:     General: Skin is warm.      Coloration: Skin is " not jaundiced.      Comments: Sebaceous cyst on the middle of patient's back roughly 2 x 2 cm without signs of infection     Neurological:      General: No focal deficit present.      Mental Status: He is alert and oriented to person, place, and time.     Psychiatric:         Mood and Affect: Mood normal.         Behavior: Behavior normal.                [1]   Allergies  Allergen Reactions    Pollen Extract Eye Swelling     Reports allergy to pollens during the summer   [2]   Current Outpatient Medications on File Prior to Visit   Medication Sig Dispense Refill    aspirin 81 mg chewable tablet Chew 1 tablet (81 mg total) daily 30 tablet 5    atorvastatin (LIPITOR) 80 mg tablet TAKE 1 TABLET BY MOUTH EVERY DAY 30 tablet 5    clopidogrel (PLAVIX) 75 mg tablet TAKE 1 TABLET BY MOUTH EVERY DAY 90 tablet 1    dapagliflozin (Farxiga) 10 MG tablet Take 1 tablet (10 mg total) by mouth daily 90 tablet 1    diltiazem (CARDIZEM CD) 120 mg 24 hr capsule TAKE 1 CAPSULE BY MOUTH EVERY DAY 30 capsule 5    levothyroxine 25 mcg tablet Take 1 tablet (25 mcg total) by mouth daily in the early morning 100 tablet 3    metoprolol succinate (TOPROL-XL) 25 mg 24 hr tablet TAKE 1 TABLET (25 MG TOTAL) BY MOUTH DAILY. 90 tablet 1    nitroglycerin (NITROSTAT) 0.4 mg SL tablet Place 1 tablet (0.4 mg total) under the tongue every 5 (five) minutes as needed for chest pain 25 tablet 5    sacubitril-valsartan (Entresto) 24-26 MG TABS Take 1 tablet by mouth 2 (two) times a day 60 tablet 11     No current facility-administered medications on file prior to visit.

## 2025-05-19 NOTE — ASSESSMENT & PLAN NOTE
55-year-old male with sebaceous cyst of back  -See HPI  - CBC and CMP from 5/8/2025 reviewed  -Hemoglobin A1c from 5/8/2025 reviewed  - Primary care physician note from 4/24/2025 reviewed  -Cardiology note from 12/31/2024 reviewed, recommendation patient is on dual antiplatelet therapy for total of 1 year after CABG  - Plan for excision of sebaceous cyst under local anesthesia  - Discussed with patient that since this is a local case and superficial I am okay if he is still taking both aspirin and Plavix during the surgery  - He has an appointment with his Cardiologist tomorrow    All risks, benefits, alternatives of the procedure were discussed at length with the patient.  Risks include bleeding, infection, damage to surrounding structures, recurrence.  All questions were answered to satisfaction.  The patient voiced understanding and signed consent.    Orders:    Case request operating room: EXCISION  BIOPSY LESION/MASS BACK; Standing

## 2025-05-19 NOTE — PROGRESS NOTES
Cardio-Oncology / Heart Failure Cardiology Clinic Note    Marcelina Ascencio 55 y.o. male   MRN: [id number]  Encounter: [id number]        Assessment / Plan:    # Cardio-Oncology Pertinent History  Hodgkin's lymphoma  Dx age 19  Initial treatment included - ABVD.     Relapse 1 year later, had MOPP and chest radiation.  Hx of bone marrow transplant 1991    # mixed ischemic and non-ischemic cardiomyopathy  # HFrEF - chronic    ETIOLOGY:  - new dx 6/2023.  EF 43% at that time.  - [facility] - multivessel CAD  - suspect 2/2 CAD + prior anthracycline therapy    VOLUME:  - not requiring diuretic  - Exam - euvolemic  - baseline BNP - 73    GDMT:  - toprol 25 daily    - entresto 24-26 BID  - spironlactone caused hyperkalemia and was DC'd  - dapagliflozin 10mg daily    DEVICE:  - has RBBB  - EF 45%    # CAD s/p CABG  New dx on [facility] May 2024.  S/p CABG x 3 in Aug 2024.  LIMA-LAD, [id number], radial-OM.  Also had PERLA clip.   ASA, plavix  (CT surgery wants DAPT for 1 year)  Statin  Diltiazem x 1 year to prevent radial artery spasm --> stop early for worsening 1st degree AV block    # small-moderate pericardial effusion  Seen on 2023 and 2024 echo.  ? Due to prior radiation  No evidence of tamponade  Will need serial f/u of this going forward  (improved on post CABG echo)    # Radiation heart valve disease  Echo shows significant MAC.  The aortic valve is calcified and there is mild to moderate AI and mild-moderate AS.  Will need serial follow-up of valve disease as it is likely to progress    # First degree AVB  Last year CO interval 250 ms.  This year CO interval 312 ms.  No sx's  This is in setting of prior XRT and prior cardiac surgery  Stop diltiazem  Check ziopatch to r/o any high degree AV block    # HTN  See GDMT above.      # HLD  On lipitor 80  LDL 53    # Preop risk eval  - pt wants to have a cyst removed from his back.  He needs to come off the Plavix for 5 days.  He is pretty far out from the CABG and I think this is reasonable  to hold the Plavix for 5 days to have the procedure safely performed.  He can restart Plavix when safe from postoperative perspective.        Today's Plan Summary:  See above assessment/plan for full details of today's plan.  Briefly,     Stop diltiazem  Check Zio patch                Reason For Visit / Chief Complaint:  F/u -  CAD, cardiomyopathy, and history of cancer therapies for lymphoma    HPI:   Marcelina Ascencio is a 55 y.o.  male with history as noted in the problem list and further detailed in the above assessment and plan.    Initial:   April 2024  Referred by Dr. Butler (general cardiology) for a new patient visit for dilated cardiomyopathy and history of cancer therapies for lymphoma  As above, the patient has a distant history of lymphoma.  Treatment included anthracycline based chemotherapy and radiation.  He also had a bone marrow transplant.  In June 2023 he had some shortness of breath for which an echo was obtained and demonstrating an EF of 43%.  This was a new diagnosis.  He was referred to cardiology and started on beta-blocker/ACE.  A repeat echo in January 2024 reported an EF down to 35% (although the biplane measurement was 43%).  Given his history of anthracycline and radiation he was referred to cardio-oncology/heart failure clinic.  Today, the patient reports -  no chest pain.  Does get BARRERA.   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   .  Had a daughter.  Smokes cigars.      Interval:  Last visit -->    finished cardiac rehab.  No chest pain.      Plan last visit -->     Check fasting lipids    Lipids -  LDL 53  CMP - stable  A1c  6.6    Today  - pt wants to have a cyst removed from his back.  He needs to come off the Plavix for 5 days.   No CP.  No SOB.   No palpitations.  No dizzy episodes or syncope.              Cardiac Imaging personally reviewed:  EKG 7-5-23  Sinus rhythm with first-degree AV block  Right bundle branch block    4-23-24  Sinus rhythm with first-degree AV  block, NJ interval 250  Right bundle branch block    5-20-25  Sinus rhythm with first-degree AV block, NJ interval 312  Right bundle branch block         Holter or event monitor    Echo 6-6-23  EF 43%  Small pericardial effusion    1-16-24  LVIDd 5.5cm.   EF 35% per report (43% by biplane)  Normal RV size and function  Mild AS.  Mild-mod AI  Small-moderate pericardial effusion, no tamponade    5-7-24  EF 40%.  Mild AS.  Mild-mod AI  small-moderate pericardial effusion, no tamponade    8-7-24  (OSH - post CABG)  EF 45%  Moderate aortic stenosis         SOLANGE    Cardiac MRI    Stress testing    Coronary CTA or [facility] - 05/07/24   ·  Ost LM lesion is 60% stenosed.  ·  Mid-distal LM lesion is 70% stenosed.  ·  Ost LAD lesion is 80% stenosed.  ·  Ost Cx to Prox Cx lesion is 85% stenosed.  ·  Mid Cx lesion is 60% stenosed.  ·  Dist RCA lesion is 70% stenosed.       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) 04/24/2025   • History of Hodgkin's disease 04/24/2025   • HFrEF (heart failure with reduced ejection fraction) ([facility]) 10/09/2024   • RBBB 05/13/2024   • CAD, multiple vessel 05/13/2024   • Class 2 obesity 05/13/2024   • 1st degree AV block 05/13/2024   • Left main coronary artery disease 05/13/2024   • Chronic systolic heart failure (HCC) 04/23/2024   • Pericardial effusion 04/23/2024   • Radiation-induced heart disease 04/23/2024   • Primary hypertension 04/23/2024   • Mixed hyperlipidemia 04/23/2024   • Cardiomyopathy ([facility]) 07/05/2023   • Family history of ischemic bowel disease 07/05/2023   • Plantar fasciitis 08/28/2018   • Mononeuropathy 11/04/2016       Past Medical History:   Diagnosis Date   • 1st degree AV block 05/13/2024   • CAD, multiple vessel 05/13/2024   • Cardiomyopathy (HCC)    • Chronic systolic heart failure ([facility]) 04/23/2024   • Class 2 obesity 05/13/2024   • Hodgkin's  lymphoma (HCC)    • Hypercholesterolemia    • Hyperlipidemia    • Nephrolithiasis    • Pericardial effusion 04/23/2024   • Plantar fasciitis 08/28/2018   • Pre-diabetes    • Primary hypertension 04/23/2024   • Radiation-induced heart disease 04/23/2024   • RBBB 05/13/2024       Allergies   Allergen Reactions   • Pollen Extract Eye Swelling     Reports allergy to pollens during the summer       Current Outpatient Medications   Medication Instructions   • aspirin 81 mg, Oral, Daily   • atorvastatin (LIPITOR) 80 mg, Oral, Daily   • clopidogrel (PLAVIX) 75 mg, Oral, Daily   • dapagliflozin (FARXIGA) 10 mg, Oral, Daily   • levothyroxine 25 mcg, Oral, Daily (early morning)   • metoprolol succinate (TOPROL-XL) 25 mg, Oral, Daily   • nitroglycerin (NITROSTAT) 0.4 mg, Sublingual, Every 5 minutes PRN   • sacubitril-valsartan (Entresto) 24-26 MG TABS 1 tablet, Oral, 2 times daily       Social History     Socioeconomic History   • Marital status: /Civil Union     Spouse name: Not on file   • Number of children: Not on file   • Years of education: Not on file   • Highest education level: Not on file   Occupational History   • Not on file   Tobacco Use   • Smoking status: Light Smoker     Types: Cigars     Passive exposure: Never   • Smokeless tobacco: Never   • Tobacco comments:     Patient smokes cigars 3-4 times a week   Vaping Use   • Vaping status: Never Used   Substance and Sexual Activity   • Alcohol use: Yes     Comment: social   • Drug use: No   • Sexual activity: Yes   Other Topics Concern   • Not on file   Social History Narrative   • Not on file     Social Drivers of Health     Financial Resource Strain: Not on file   Food Insecurity: Not on file   Transportation Needs: Not on file   Physical Activity: Inactive (6/22/2019)    Exercise Vital Sign    • Days of Exercise per Week: 0 days    • Minutes of Exercise per Session: 0 min   Stress: No Stress Concern Present (5/1/2023)    [employer] - Occupational Stress Questionnaire    • Feeling of Stress : Not at all   Social Connections: Unknown (6/18/2024)    Received from [employer]    Social [employer]    • How often do you feel lonely or isolated from those around you? (Adult - for ages 18 years and over): Not on file   Intimate Partner Violence: Not on file   Housing Stability: Not on file       Family History   Problem Relation Age of Onset   • Hyperlipidemia Mother    • Hypertension Mother    • Heart attack Father         age 59.  was a heavy drinker.       Physical Exam:  Blood pressure 136/86, pulse 74, height 6' 1\" (1.854 m), weight 124 kg (273 lb 8 oz), SpO2 98%.  Body mass index is 36.08 kg/m².  Wt Readings from Last 3 Encounters:   05/20/25 124 kg (273 lb 8 oz)   05/19/25 124 kg (273 lb 3.2 oz)   04/24/25 123 kg (272 lb 3.2 oz)     Physical Exam  Vitals reviewed.   Constitutional:       General: He is not in acute distress.     Appearance: He is not toxic-appearing.   Neck:      Comments: No JVP elevation  Cardiovascular:      Rate and Rhythm: Normal rate and regular rhythm.      Heart sounds: Murmur (systolic at base) heard.      No friction rub. No gallop.   Pulmonary:      Breath sounds: Normal breath sounds. No wheezing, rhonchi or rales.     Musculoskeletal:      Comments: No edema     Neurological:      Mental Status: He is alert.         Labs & Results:  Lab Results   Component Value Date    SODIUM 139 05/08/2025    K 4.6 05/08/2025     05/08/2025    CO2 28 05/08/2025    BUN 21 05/08/2025    CREATININE 1.12 05/08/2025    GLUC 90 10/21/2024    CALCIUM 9.4 05/08/2025     No results found for: \"NTBNP\"       Time Statement:  I have spent a total time of 43 minutes in caring for this patient on the day of the visit/encounter including Diagnostic results, Prognosis, Risks and benefits of tx options, Instructions for management, Patient and family education, Importance of tx compliance, Risk factor reductions, Impressions, " Counseling / Coordination of care, Documenting in the medical record, Reviewing/placing orders in the medical record (including tests, medications, and/or procedures), and Obtaining or reviewing history  .        Thank you for the opportunity to participate in the care of this patient.    Mayco Nayak MD, [facility]  Staff Cardiologist  Director of Cardio-Oncology  [facility]

## 2025-05-20 ENCOUNTER — OFFICE VISIT (OUTPATIENT)
Dept: CARDIOLOGY CLINIC | Facility: CLINIC | Age: 55
End: 2025-05-20
Payer: COMMERCIAL

## 2025-05-20 ENCOUNTER — TELEPHONE (OUTPATIENT)
Dept: HEMATOLOGY ONCOLOGY | Facility: CLINIC | Age: 55
End: 2025-05-20

## 2025-05-20 VITALS
HEART RATE: 74 BPM | SYSTOLIC BLOOD PRESSURE: 136 MMHG | DIASTOLIC BLOOD PRESSURE: 86 MMHG | WEIGHT: 273.5 LBS | OXYGEN SATURATION: 98 % | HEIGHT: 73 IN | BODY MASS INDEX: 36.25 KG/M2

## 2025-05-20 DIAGNOSIS — I51.9 RADIATION-INDUCED HEART DISEASE: ICD-10-CM

## 2025-05-20 DIAGNOSIS — E78.2 MIXED HYPERLIPIDEMIA: ICD-10-CM

## 2025-05-20 DIAGNOSIS — I25.5 ISCHEMIC CARDIOMYOPATHY: ICD-10-CM

## 2025-05-20 DIAGNOSIS — I25.10 CAD IN NATIVE ARTERY: ICD-10-CM

## 2025-05-20 DIAGNOSIS — I10 PRIMARY HYPERTENSION: ICD-10-CM

## 2025-05-20 DIAGNOSIS — I50.22 CHRONIC SYSTOLIC HEART FAILURE (HCC): ICD-10-CM

## 2025-05-20 DIAGNOSIS — I44.0 1ST DEGREE AV BLOCK: ICD-10-CM

## 2025-05-20 DIAGNOSIS — I31.39 PERICARDIAL EFFUSION: ICD-10-CM

## 2025-05-20 DIAGNOSIS — Z01.818 PREOPERATIVE CLEARANCE: Primary | ICD-10-CM

## 2025-05-20 DIAGNOSIS — I45.10 RBBB: ICD-10-CM

## 2025-05-20 PROCEDURE — 99215 OFFICE O/P EST HI 40 MIN: CPT | Performed by: INTERNAL MEDICINE

## 2025-05-20 PROCEDURE — 93000 ELECTROCARDIOGRAM COMPLETE: CPT | Performed by: INTERNAL MEDICINE

## 2025-05-23 DIAGNOSIS — I25.10 CAD, MULTIPLE VESSEL: ICD-10-CM

## 2025-05-23 DIAGNOSIS — I25.5 ISCHEMIC CARDIOMYOPATHY: ICD-10-CM

## 2025-05-23 RX ORDER — CLOPIDOGREL BISULFATE 75 MG/1
75 TABLET ORAL DAILY
Qty: 30 TABLET | Refills: 5 | Status: SHIPPED | OUTPATIENT
Start: 2025-05-23

## 2025-05-23 RX ORDER — METOPROLOL SUCCINATE 25 MG/1
25 TABLET, EXTENDED RELEASE ORAL DAILY
Qty: 30 TABLET | Refills: 5 | Status: SHIPPED | OUTPATIENT
Start: 2025-05-23

## 2025-05-30 PROCEDURE — 87070 CULTURE OTHR SPECIMN AEROBIC: CPT | Performed by: OTOLARYNGOLOGY

## 2025-05-30 PROCEDURE — 87205 SMEAR GRAM STAIN: CPT | Performed by: OTOLARYNGOLOGY

## 2025-06-06 ENCOUNTER — HOSPITAL ENCOUNTER (OUTPATIENT)
Dept: CT IMAGING | Facility: HOSPITAL | Age: 55
End: 2025-06-06
Attending: OTOLARYNGOLOGY
Payer: COMMERCIAL

## 2025-06-06 PROCEDURE — 70486 CT MAXILLOFACIAL W/O DYE: CPT

## 2025-06-07 DIAGNOSIS — E03.9 ACQUIRED HYPOTHYROIDISM: ICD-10-CM

## 2025-06-08 RX ORDER — LEVOTHYROXINE SODIUM 25 MCG
25 TABLET ORAL
Qty: 90 TABLET | Refills: 1 | Status: SHIPPED | OUTPATIENT
Start: 2025-06-08

## 2025-06-13 ENCOUNTER — NURSE TRIAGE (OUTPATIENT)
Age: 55
End: 2025-06-13

## 2025-06-13 NOTE — TELEPHONE ENCOUNTER
"REASON FOR CONVERSATION: No Triage Call    SYMPTOMS: n/a    OTHER HEALTH INFORMATION: Patient questioned if he needed to hold ASA and Plavix prior to procedure on 6/20 with Dr Her    PROTOCOL DISPOSITION: Home Care    CARE ADVICE PROVIDED: Per office visit note from 5/19  \"- Discussed with patient that since this is a local case and superficial I am okay if he is still taking both aspirin and Plavix during the surgery \"  Patient understands    PRACTICE FOLLOW-UP: n/a  #    Reason for Disposition   Health information question, no triage required and triager able to answer question    Answer Assessment - Initial Assessment Questions  1. REASON FOR CALL: \"What is the main reason for your call?\" or \"How can I best help you?\"      Patient questioned if he needed to hold blood thinners prior to procedure with Dr Her  2. SYMPTOMS : \"Do you have any symptoms?\"       N/a  3. OTHER QUESTIONS: \"Do you have any other questions?\"      N/a    Protocols used: Information Only Call - No Triage-Adult-[place]    "

## 2025-06-19 ENCOUNTER — TELEPHONE (OUTPATIENT)
Dept: CARDIOLOGY CLINIC | Facility: CLINIC | Age: 55
End: 2025-06-19

## 2025-06-19 ENCOUNTER — TELEPHONE (OUTPATIENT)
Age: 55
End: 2025-06-19

## 2025-06-19 ENCOUNTER — HOSPITAL ENCOUNTER (OUTPATIENT)
Facility: HOSPITAL | Age: 55
Setting detail: OBSERVATION
Discharge: HOME | End: 2025-06-21
Attending: EMERGENCY MEDICINE | Admitting: INTERNAL MEDICINE
Payer: COMMERCIAL

## 2025-06-19 DIAGNOSIS — I45.5 SINUS PAUSE: Primary | ICD-10-CM

## 2025-06-19 DIAGNOSIS — I25.10 CAD, MULTIPLE VESSEL: ICD-10-CM

## 2025-06-19 DIAGNOSIS — I50.22 CHRONIC SYSTOLIC HEART FAILURE (HCC): ICD-10-CM

## 2025-06-19 DIAGNOSIS — I50.20 HFREF (HEART FAILURE WITH REDUCED EJECTION FRACTION) (HCC): ICD-10-CM

## 2025-06-19 DIAGNOSIS — I44.0 1ST DEGREE AV BLOCK: ICD-10-CM

## 2025-06-19 DIAGNOSIS — I42.9 CARDIOMYOPATHY (HCC): ICD-10-CM

## 2025-06-19 PROBLEM — C81.90 HODGKIN'S LYMPHOMA (HCC): Status: ACTIVE | Noted: 2018-08-28

## 2025-06-19 PROBLEM — I44.30 AVB (ATRIOVENTRICULAR BLOCK): Status: ACTIVE | Noted: 2025-06-19

## 2025-06-19 PROBLEM — I47.29 NSVT (NONSUSTAINED VENTRICULAR TACHYCARDIA) (HCC): Status: ACTIVE | Noted: 2025-06-19

## 2025-06-19 LAB
2HR DELTA HS TROPONIN: 1 NG/L
ALBUMIN SERPL BCG-MCNC: 4.3 G/DL (ref 3.5–5)
ALP SERPL-CCNC: 70 U/L (ref 34–104)
ALT SERPL W P-5'-P-CCNC: 20 U/L (ref 7–52)
ANION GAP SERPL CALCULATED.3IONS-SCNC: 7 MMOL/L (ref 4–13)
AST SERPL W P-5'-P-CCNC: 16 U/L (ref 13–39)
BASOPHILS # BLD AUTO: 0.03 THOUSANDS/ÂΜL (ref 0–0.1)
BASOPHILS NFR BLD AUTO: 1 % (ref 0–1)
BILIRUB SERPL-MCNC: 0.72 MG/DL (ref 0.2–1)
BUN SERPL-MCNC: 18 MG/DL (ref 5–25)
CALCIUM SERPL-MCNC: 9.3 MG/DL (ref 8.4–10.2)
CARDIAC TROPONIN I PNL SERPL HS: 11 NG/L (ref ?–50)
CARDIAC TROPONIN I PNL SERPL HS: 12 NG/L (ref ?–50)
CHLORIDE SERPL-SCNC: 107 MMOL/L (ref 96–108)
CO2 SERPL-SCNC: 27 MMOL/L (ref 21–32)
CREAT SERPL-MCNC: 0.95 MG/DL (ref 0.6–1.3)
EOSINOPHIL # BLD AUTO: 0.08 THOUSAND/ÂΜL (ref 0–0.61)
EOSINOPHIL NFR BLD AUTO: 1 % (ref 0–6)
ERYTHROCYTE [DISTWIDTH] IN BLOOD BY AUTOMATED COUNT: 12.9 % (ref 11.6–15.1)
GFR SERPL CREATININE-BSD FRML MDRD: 90 ML/MIN/1.73SQ M
GLUCOSE SERPL-MCNC: 93 MG/DL (ref 65–140)
HCT VFR BLD AUTO: 37.5 % (ref 36.5–49.3)
HGB BLD-MCNC: 12.6 G/DL (ref 12–17)
IMM GRANULOCYTES # BLD AUTO: 0.03 THOUSAND/UL (ref 0–0.2)
IMM GRANULOCYTES NFR BLD AUTO: 1 % (ref 0–2)
LYMPHOCYTES # BLD AUTO: 0.99 THOUSANDS/ÂΜL (ref 0.6–4.47)
LYMPHOCYTES NFR BLD AUTO: 16 % (ref 14–44)
MCH RBC QN AUTO: 30.8 PG (ref 26.8–34.3)
MCHC RBC AUTO-ENTMCNC: 33.6 G/DL (ref 31.4–37.4)
MCV RBC AUTO: 92 FL (ref 82–98)
MONOCYTES # BLD AUTO: 0.51 THOUSAND/ÂΜL (ref 0.17–1.22)
MONOCYTES NFR BLD AUTO: 8 % (ref 4–12)
NEUTROPHILS # BLD AUTO: 4.49 THOUSANDS/ÂΜL (ref 1.85–7.62)
NEUTS SEG NFR BLD AUTO: 73 % (ref 43–75)
NRBC BLD AUTO-RTO: 0 /100 WBCS
PLATELET # BLD AUTO: 209 THOUSANDS/UL (ref 149–390)
PMV BLD AUTO: 10.6 FL (ref 8.9–12.7)
POTASSIUM SERPL-SCNC: 4 MMOL/L (ref 3.5–5.3)
PROT SERPL-MCNC: 7 G/DL (ref 6.4–8.4)
RBC # BLD AUTO: 4.09 MILLION/UL (ref 3.88–5.62)
SODIUM SERPL-SCNC: 141 MMOL/L (ref 135–147)
WBC # BLD AUTO: 6.13 THOUSAND/UL (ref 4.31–10.16)

## 2025-06-19 PROCEDURE — 80053 COMPREHEN METABOLIC PANEL: CPT

## 2025-06-19 PROCEDURE — 93005 ELECTROCARDIOGRAM TRACING: CPT

## 2025-06-19 PROCEDURE — 85025 COMPLETE CBC W/AUTO DIFF WBC: CPT

## 2025-06-19 PROCEDURE — 84484 ASSAY OF TROPONIN QUANT: CPT

## 2025-06-19 PROCEDURE — 36415 COLL VENOUS BLD VENIPUNCTURE: CPT

## 2025-06-19 PROCEDURE — 99223 1ST HOSP IP/OBS HIGH 75: CPT | Performed by: INTERNAL MEDICINE

## 2025-06-19 PROCEDURE — 99285 EMERGENCY DEPT VISIT HI MDM: CPT | Performed by: EMERGENCY MEDICINE

## 2025-06-19 PROCEDURE — 99215 OFFICE O/P EST HI 40 MIN: CPT | Performed by: PHYSICIAN ASSISTANT

## 2025-06-19 PROCEDURE — 99284 EMERGENCY DEPT VISIT MOD MDM: CPT

## 2025-06-19 RX ORDER — CLOPIDOGREL BISULFATE 75 MG/1
75 TABLET ORAL DAILY
Status: DISCONTINUED | OUTPATIENT
Start: 2025-06-20 | End: 2025-06-21 | Stop reason: HOSPADM

## 2025-06-19 RX ORDER — ASPIRIN 81 MG/1
81 TABLET, CHEWABLE ORAL DAILY
Status: DISCONTINUED | OUTPATIENT
Start: 2025-06-20 | End: 2025-06-21 | Stop reason: HOSPADM

## 2025-06-19 RX ORDER — LEVOTHYROXINE SODIUM 25 UG/1
25 TABLET ORAL
Status: DISCONTINUED | OUTPATIENT
Start: 2025-06-20 | End: 2025-06-21 | Stop reason: HOSPADM

## 2025-06-19 RX ORDER — SODIUM CHLORIDE, SODIUM LACTATE, POTASSIUM CHLORIDE, CALCIUM CHLORIDE 600; 310; 30; 20 MG/100ML; MG/100ML; MG/100ML; MG/100ML
20 INJECTION, SOLUTION INTRAVENOUS CONTINUOUS
Status: DISCONTINUED | OUTPATIENT
Start: 2025-06-20 | End: 2025-06-21 | Stop reason: HOSPADM

## 2025-06-19 RX ORDER — METOPROLOL SUCCINATE 25 MG/1
25 TABLET, EXTENDED RELEASE ORAL DAILY
Status: DISCONTINUED | OUTPATIENT
Start: 2025-06-19 | End: 2025-06-20

## 2025-06-19 RX ORDER — ATORVASTATIN CALCIUM 80 MG/1
80 TABLET, FILM COATED ORAL DAILY
Status: DISCONTINUED | OUTPATIENT
Start: 2025-06-19 | End: 2025-06-21 | Stop reason: HOSPADM

## 2025-06-19 RX ADMIN — SACUBITRIL AND VALSARTAN 1 TABLET: 24; 26 TABLET, FILM COATED ORAL at 17:06

## 2025-06-19 RX ADMIN — METOPROLOL SUCCINATE 25 MG: 25 TABLET, EXTENDED RELEASE ORAL at 17:06

## 2025-06-19 RX ADMIN — ATORVASTATIN CALCIUM 80 MG: 80 TABLET, FILM COATED ORAL at 17:06

## 2025-06-19 NOTE — TELEPHONE ENCOUNTER
Beatriz from [employer] called to report on 06/07 at 9:02am patient had a 4.8 second pause possibly due to high grade AV block. This can be found on page 16 strip # 9

## 2025-06-19 NOTE — ED PROVIDER NOTES
Time reflects when diagnosis was documented in both MDM as applicable and the Disposition within this note       Time User Action Codes Description Comment    6/19/2025  2:59 PM Saba Mancuso [I45.5] Sinus pause     6/19/2025  3:00 PM Saba Mancuso Add [I44.0] 1st degree AV block     6/19/2025  3:00 PM Saba Mancuso Add [I50.22] Chronic systolic heart failure (HCC)     6/19/2025  3:00 PM Saba Mancuso Add [I25.10] CAD, multiple vessel     6/19/2025  3:00 PM Saba Mancuso Add [I42.9] Cardiomyopathy (HCC)     6/19/2025  3:00 PM Saba Mancuso Add [I50.20] HFrEF (heart failure with reduced ejection fraction) ([facility])           ED Disposition       ED Disposition   Admit    Condition   Stable    Date/Time   Thu Jun 19, 2025  3:09 PM    Comment   Case was discussed with TATYANA and the patient's admission status was agreed to be Admission Status: observation status.               Assessment & Plan       Medical Decision Making  Patient is a 55 y.o. male  who presents to the ED per cardiology recommendation after 4.8-second pause noted on ZIO monitor.    Vital signs stable. Exam as listed below.    History and physical exam are most consistent with first-degree AV block, right bundle branch block, concern for higher grade block.    Plan   CBC to evaluate for anemia, evaluate for leukocytosis as sign of infectious source of symptoms.  CMP to evaluate for electrolyte derangement, assess renal and hepatic function.  Troponin to evaluate for cardiac ischemia, rule out NSTEMI.  ECG to evaluate for arrhythmia, heart block, ST changes to rule out STEMI, pericarditis.  Will reach out to cardiology/EP for further recommendations    View ED course below for further discussion on patient workup.     All labs reviewed and utilized in the medical decision making process  I reviewed all testing with the patient.     Upon re-evaluation patient admitted to [facility] for EP consult.    Portions of the record may have been  "created with voice recognition software. Occasional wrong word or \"sound a like\" substitutions may have occurred due to the inherent limitations of voice recognition software. Read the chart carefully and recognize, using context, where substitutions have occurred.     QCD/QCD       Amount and/or Complexity of Data Reviewed  Labs: ordered.    Risk  Decision regarding hospitalization.        ED Course as of 06/19/25 1509   Thu Jun 19, 2025   1445 Procedure Note: EKG  Date/Time: 06/19/25 2:45 PM   Indications / Diagnosis: 4.8 second pause on zio  ECG reviewed by me, the ED Provider: yes   The EKG demonstrates:  Rhythm: normal sinus  Intervals: Prolonged WI interval  Axis: Right axis  QRS/Blocks: 1st degree AV block, RBBB  ST Changes: No acute ST Changes, no STD/SARAH.     1452 EP will evaluate       Medications - No data to display    ED Risk Strat Scores                    No data recorded        SBIRT 20yo+      Flowsheet Row Most Recent Value   Initial Alcohol Screen: US AUDIT-C     1. How often do you have a drink containing alcohol? 0 Filed at: 06/19/2025 1427   2. How many drinks containing alcohol do you have on a typical day you are drinking?  0 Filed at: 06/19/2025 1427   3a. Male UNDER 65: How often do you have five or more drinks on one occasion? 0 Filed at: 06/19/2025 1427   3b. FEMALE Any Age, or MALE 65+: How often do you have 4 or more drinks on one occassion? 0 Filed at: 06/19/2025 1427   Audit-C Score 0 Filed at: 06/19/2025 1427   PETE: How many times in the past year have you...    Used an illegal drug or used a prescription medication for non-medical reasons? Never Filed at: 06/19/2025 1427                            History of Present Illness       Chief Complaint   Patient presents with    Evaluation of Abnormal Diagnostic Test     Pt had a [employer] device on which caught a 4.8 second pause possibly due to high grade AV block on 06/07/25 at 0902. Pt was called by doctors office today about this " finding and told to come to the ED. He does not have any s/s or c/o at this time.        Past Medical History[1]   Past Surgical History[2]   Family History[3]   Social History[4]   E-Cigarette/Vaping    E-Cigarette Use Never User       E-Cigarette/Vaping Substances    Nicotine No     THC No     CBD No     Flavoring No     Other No     Unknown No       I have reviewed and agree with the history as documented.     Patient is a 55-year-old male, past medical history first-degree AV block, CAD s/p CABG x3 in 2024, HLD, pericardial effusion; presenting today per cardiology recommendation after a 4.8 second pause was noted on his zio monitor.  Patient states he is entirely asymptomatic today, has otherwise been feeling well.  He notes that he has had 1 episode of lightheadedness over the past few weeks but has otherwise been feeling well, no dyspnea since his CABG.  Ambulates without dyspnea, no orthopnea.  No lower extremity edema.  No recent illnesses.  No fevers.      History provided by:  Patient      Review of Systems        Objective       ED Triage Vitals [06/19/25 1359]   Temperature Pulse Blood Pressure Respirations SpO2 Patient Position - Orthostatic VS   97.8 °F (36.6 °C) 89 129/76 20 96 % --      Temp Source Heart Rate Source BP Location FiO2 (%) Pain Score    Temporal -- -- -- No Pain      Vitals      Date and Time Temp Pulse SpO2 Resp BP Pain Score FACES Pain Rating User   06/19/25 1359 97.8 °F (36.6 °C) 89 96 % 20 129/76 No Pain -- HK            Physical Exam  Vitals and nursing note reviewed.   Constitutional:       General: He is not in acute distress.     Appearance: He is well-developed. He is not ill-appearing or diaphoretic.   HENT:      Head: Normocephalic and atraumatic.      Mouth/Throat:      Mouth: Mucous membranes are moist.     Eyes:      Conjunctiva/sclera: Conjunctivae normal.       Cardiovascular:      Rate and Rhythm: Normal rate and regular rhythm.   Pulmonary:      Effort: Pulmonary  effort is normal. No respiratory distress.      Breath sounds: Normal breath sounds. No wheezing or rhonchi.   Abdominal:      Palpations: Abdomen is soft.      Tenderness: There is no abdominal tenderness.     Musculoskeletal:         General: No swelling.      Cervical back: Neck supple.      Right lower leg: No edema.      Left lower leg: No edema.     Skin:     General: Skin is warm and dry.      Capillary Refill: Capillary refill takes less than 2 seconds.     Neurological:      Mental Status: He is alert.     Psychiatric:         Mood and Affect: Mood normal.         Results Reviewed       Procedure Component Value Units Date/Time    CBC and differential [[id number]] Collected: 06/19/25 1448    Lab Status: Final result Specimen: Blood from Arm, Right Updated: 06/19/25 1500     WBC 6.13 Thousand/uL      RBC 4.09 Million/uL      Hemoglobin 12.6 g/dL      Hematocrit 37.5 %      MCV 92 fL      MCH 30.8 pg      MCHC 33.6 g/dL      RDW 12.9 %      MPV 10.6 fL      Platelets 209 Thousands/uL      nRBC 0 /100 WBCs      Segmented % 73 %      Immature Grans % 1 %      Lymphocytes % 16 %      Monocytes % 8 %      Eosinophils Relative 1 %      Basophils Relative 1 %      Absolute Neutrophils 4.49 Thousands/µL      Absolute Immature Grans 0.03 Thousand/uL      Absolute Lymphocytes 0.99 Thousands/µL      Absolute Monocytes 0.51 Thousand/µL      Eosinophils Absolute 0.08 Thousand/µL      Basophils Absolute 0.03 Thousands/µL     HS Troponin 0hr (reflex protocol) [[id number]] Collected: 06/19/25 1448    Lab Status: In process Specimen: Blood from Arm, Right Updated: 06/19/25 1453    Comprehensive metabolic panel [[id number]] Collected: 06/19/25 1448    Lab Status: In process Specimen: Blood from Arm, Right Updated: 06/19/25 1453            No orders to display       Procedures    ED Medication and Procedure Management   Prior to Admission Medications   Prescriptions Last Dose Informant Patient Reported? Taking?   aspirin 81 mg  chewable tablet  Self No No   Sig: Chew 1 tablet (81 mg total) daily   atorvastatin (LIPITOR) 80 mg tablet  Self No No   Sig: TAKE 1 TABLET BY MOUTH EVERY DAY   clopidogrel (PLAVIX) 75 mg tablet   No No   Sig: TAKE 1 TABLET BY MOUTH EVERY DAY   dapagliflozin (Farxiga) 10 MG tablet  Self No No   Sig: Take 1 tablet (10 mg total) by mouth daily   doxycycline hyclate (VIBRAMYCIN) 100 mg capsule   No No   Sig: Take 1 capsule (100 mg total) by mouth every 12 (twelve) hours for 21 days   levothyroxine (Synthroid) 25 mcg tablet   No No   Sig: TAKE 1 TABLET (25 MCG TOTAL) BY MOUTH DAILY IN THE EARLY MORNING   metoprolol succinate (TOPROL-XL) 25 mg 24 hr tablet   No No   Sig: TAKE 1 TABLET BY MOUTH EVERY DAY   nitroglycerin (NITROSTAT) 0.4 mg SL tablet  Self No No   Sig: Place 1 tablet (0.4 mg total) under the tongue every 5 (five) minutes as needed for chest pain   sacubitril-valsartan (Entresto) 24-26 MG TABS  Self No No   Sig: Take 1 tablet by mouth 2 (two) times a day      Facility-Administered Medications: None     Patient's Medications   Discharge Prescriptions    No medications on file     No discharge procedures on file.  ED SEPSIS DOCUMENTATION   Time reflects when diagnosis was documented in both MDM as applicable and the Disposition within this note       Time User Action Codes Description Comment    6/19/2025  2:59 PM Saba Mancuso [I45.5] Sinus pause     6/19/2025  3:00 PM Saba Mancuso [I44.0] 1st degree AV block     6/19/2025  3:00 PM Saba Mancuso [I50.22] Chronic systolic heart failure (HCC)     6/19/2025  3:00 PM Saba Mancuso [I25.10] CAD, multiple vessel     6/19/2025  3:00 PM Saba Mancuso [I42.9] Cardiomyopathy (HCC)     6/19/2025  3:00 PM Saba Mancuso [I50.20] HFrEF (heart failure with reduced ejection fraction) ([facility])                      [1]   Past Medical History:  Diagnosis Date    1st degree AV block 05/13/2024    CAD, multiple vessel 05/13/2024     Cardiomyopathy (HCC)     Chronic systolic heart failure (HCC) 04/23/2024    Class 2 obesity 05/13/2024    Hodgkin's lymphoma (HCC)     Hypercholesterolemia     Hyperlipidemia     Nephrolithiasis     Pericardial effusion 04/23/2024    Plantar fasciitis 08/28/2018    Pre-diabetes     Primary hypertension 04/23/2024    Radiation-induced heart disease 04/23/2024    RBBB 05/13/2024   [2]   Past Surgical History:  Procedure Laterality Date    BONE MARROW TRANSPLANT  01/01/1991 1991    CARDIAC CATHETERIZATION Left 05/07/2024    Procedure: Cardiac Left Heart Cath;  Surgeon: Jeff Marcano MD;  Location: [facility];  Service: Cardiology    CARDIAC CATHETERIZATION N/A 05/07/2024    Procedure: Cardiac Coronary Angiogram;  Surgeon: Jeff Marcano MD;  Location: [facility];  Service: Cardiology    CARDIAC CATHETERIZATION  05/07/2024    Procedure: Cardiac catheterization;  Surgeon: Jeff Marcano MD;  Location: [facility];  Service: Cardiology    COLONOSCOPY      WISDOM TOOTH EXTRACTION     [3]   Family History  Problem Relation Name Age of Onset    Hyperlipidemia Mother      Hypertension Mother      Heart attack Father          age 59.  was a heavy drinker.   [4]   Social History  Tobacco Use    Smoking status: Light Smoker     Types: Cigars     Passive exposure: Never    Smokeless tobacco: Never    Tobacco comments:     Patient smokes cigars 3-4 times a week   Vaping Use    Vaping status: Never Used   Substance Use Topics    Alcohol use: Yes     Comment: social    Drug use: No        Saba Mancuso DO  06/19/25 1529

## 2025-06-19 NOTE — ASSESSMENT & PLAN NOTE
Wt Readings from Last 3 Encounters:   05/30/25 124 kg (273 lb)   05/20/25 124 kg (273 lb 8 oz)   05/19/25 124 kg (273 lb 3.2 oz)     new dx 6/2023.  EF 43% at that time.  LHC - multivessel CAD  suspect 2/2 CAD + prior anthracycline therapy  Not on exacerbation  Presently on LR 20 cc/hr  Cont monitoring volumed status  Cont Entresto

## 2025-06-19 NOTE — ASSESSMENT & PLAN NOTE
Noted history at age 19  Treated in [place]  Initially treated with ABVD with relapse at 1 year had MOPP and chest radiation  Hx of bone marrow transplant 1991  Currently in remission

## 2025-06-19 NOTE — ASSESSMENT & PLAN NOTE
New dx on [facility] May 2024.  S/p CABG x 3 in Aug 2024.   Cont ASA, Plavix , Lipitor, Entresto

## 2025-06-19 NOTE — CONSULTS
Consultation - Electrophysiology - Cardiology  Marcelina Ascencio 55 y.o. male MRN: [id number]  Unit/Bed#: [facility] Encounter: [id number]      Inpatient consult to Electrophysiology  Consult performed by: Ras Jones PA-C  Consult ordered by: Amandeep Lopez MD          History of Present Illness   Physician Requesting Consult: Laurie Rogers MD  Reason for Consult / Principal Problem: 5 second pause on zio     Assessment & Plan   Assessment & Plan  AVB (atrioventricular block)  Noted with high degree AV block on Zio monitor placed for first-degree AV block  5-second pause  On metoprolol for AV jordan blocking agents  Recommended for ER presentation for EP evaluation for pacemaker  Last echo EF 40%  Will need either CRT-P versus CRT-D  Need new echo to further evaluate given last echo around 1 year ago  Consider EPS to determine if ICD warranted  NPO at midnight may be able to accommodate tomorrow.   Pericardial effusion  Seen on echo 2023 and 2024  No evidence of tamponade  Follow-up repeat echo  NSVT (nonsustained ventricular tachycardia) (HCC)  Noted on outpatient Zio monitor as well  Consider up titration of beta-blockers once pacer placed  Chronic systolic heart failure (HCC)  Wt Readings from Last 3 Encounters:   05/30/25 124 kg (273 lb)   05/20/25 124 kg (273 lb 8 oz)   05/19/25 124 kg (273 lb 3.2 oz)   Mixed ischemic and nonischemic  Diagnosed 6/23 with EF 43% at time  Likely in the setting of CAD and anthracycline therapy  GDMT Toprol XL 25 mg daily, Entresto 24-26 twice daily, Jardiance  Recheck new echocardiogram for repeat EF  Hodgkin's lymphoma (HCC)  Noted history at age 19  Treated in [place]  Initially treated with ABVD with relapse at 1 year had MOPP and chest radiation  Hx of bone marrow transplant 1991  Currently in remission  Coronary artery disease involving native coronary artery of native heart without angina pectoris  Diagnosed on left heart cath May 2024  S/p CABG x 3 in August 2024 with LIMA to  LAD, SVG to radial RPDA, radial OM with PERLA clip  On aspirin and Plavix maintain DAPT for 1 year  On statin  Was on dual for radial artery spasm stop secondary to first-degree AV block  Consider restarting after pacer in place  First degree AV block      HPI: Marcelina Ascencio is a 55 y.o. year old male with past medical history as mentioned above who presents to [facility] after undergoing Zio monitor placement showing 5-second high degree AV block.  He has known history of first-degree AV block in the setting of prior chemo and radiation from history of lymphoma.  He does have history of coronary disease and underwent CABG x 3 in 2024.  He has not had any episodes of syncope.  Denies any acute shortness of breath or dyspnea with exertion as he walks on treadmill frequently.  Last EF noted on echo 43%.  Zio monitor did show periods of nonsustained ventricular tachycardia patient would benefit from increased dose of beta-blocker however unable to uptitrate in the setting of high degree AV block.  There is question whether we should put in a CRT-D versus CRT-P.  It is highly likely that patient will progress to complete heart block requiring 100% ventricular pacing and thus biventricular device is opted for now to avoid need for reimplant in the future.  Given ventricular tachycardia, and need for device, we will repeat an echocardiogram and consider doing EP study if ejection fraction has been preserved for need for ICD component.  We discussed this with the patient detail and he was agreeable.    TELE: strips of high degree AVB q      Historical Information   Past Medical History[1]  Past Surgical History[2]  Social History     Substance and Sexual Activity   Alcohol Use Yes    Comment: social     Social History     Substance and Sexual Activity   Drug Use No     Tobacco Use History[3]  Family History: Family history non-contributory    Meds/Allergies   Hospital Medications: No current facility-administered medications for this  "encounter.  Home Medications: Not in a hospital admission.    Allergies[4]    Objective   Vitals: Blood pressure 129/76, pulse 89, temperature 97.8 °F (36.6 °C), temperature source Temporal, resp. rate 20, SpO2 96%.  Orthostatic Blood Pressures      Flowsheet Row Most Recent Value   Blood Pressure 129/76 filed at 06/19/2025 1359            No intake or output data in the 24 hours ending 06/19/25 1514    Invasive Devices       Peripheral Intravenous Line  Duration             Peripheral IV 06/19/25 Left Antecubital <1 day                    Review of Systems:  ROS  ROS as noted above, otherwise 12 point review of systems was performed and is negative.     Physical Exam:   Physical Exam  Vitals and nursing note reviewed.   Constitutional:       General: He is not in acute distress.  HENT:      Head: Normocephalic and atraumatic.     Cardiovascular:      Rate and Rhythm: Normal rate and regular rhythm.   Pulmonary:      Effort: Pulmonary effort is normal. No respiratory distress.      Breath sounds: Normal breath sounds.     Musculoskeletal:      Right lower leg: Edema present.      Left lower leg: Edema present.     Skin:     General: Skin is warm and dry.     Neurological:      General: No focal deficit present.      Mental Status: He is alert and oriented to person, place, and time.     Psychiatric:         Mood and Affect: Mood normal.         Lab Results: I have personally reviewed pertinent lab results.    Results from last 7 days   Lab Units 06/19/25  1448   WBC Thousand/uL 6.13   HEMOGLOBIN g/dL 12.6   HEMATOCRIT % 37.5   PLATELETS Thousands/uL 209           Invalid input(s): \"LABGLOM\"            Imaging: Results Review Statement: No pertinent imaging studies reviewed.  ECHO: No results found for this or any previous visit.       Cardiac testing:   ECHO:   No results found for this or any previous visit.    No results found for this or any previous visit.      CATH:  No results found for this or any previous " visit.      STRESS TEST:  No results found for this or any previous visit.      VTE Prophylaxis: Sequential compression device (Venodyne)           [1]   Past Medical History:  Diagnosis Date    1st degree AV block 05/13/2024    CAD, multiple vessel 05/13/2024    Cardiomyopathy (HCC)     Chronic systolic heart failure (HCC) 04/23/2024    Class 2 obesity 05/13/2024    Hodgkin's lymphoma (HCC)     Hypercholesterolemia     Hyperlipidemia     Nephrolithiasis     Pericardial effusion 04/23/2024    Plantar fasciitis 08/28/2018    Pre-diabetes     Primary hypertension 04/23/2024    Radiation-induced heart disease 04/23/2024    RBBB 05/13/2024   [2]   Past Surgical History:  Procedure Laterality Date    BONE MARROW TRANSPLANT  01/01/1991 1991    CARDIAC CATHETERIZATION Left 05/07/2024    Procedure: Cardiac Left Heart Cath;  Surgeon: Jeff Marcano MD;  [facility];  Service: Cardiology    CARDIAC CATHETERIZATION N/A 05/07/2024    Procedure: Cardiac Coronary Angiogram;  Surgeon: Jeff Marcano MD;  [facility];  Service: Cardiology    CARDIAC CATHETERIZATION  05/07/2024    Procedure: Cardiac catheterization;  Surgeon: Jeff Marcano MD;  [facility]: [facility];  Service: Cardiology    COLONOSCOPY      WISDOM TOOTH EXTRACTION     [3]   Social History  Tobacco Use   Smoking Status Light Smoker    Types: Cigars    Passive exposure: Never   Smokeless Tobacco Never   Tobacco Comments    Patient smokes cigars 3-4 times a week   [4]   Allergies  Allergen Reactions    Pollen Extract Eye Swelling     Reports allergy to pollens during the summer

## 2025-06-19 NOTE — H&P
H&P - Hospitalist   Name: Marcelina Ascencio 55 y.o. male I MRN: [id number]  Unit/Bed#: [facility] I Date of Admission: 6/19/2025   Date of Service: 6/19/2025 I Hospital Day: 0     Assessment & Plan  First degree AV block  Pacemaker placement  Obs on tele  Hold chemo DVT ppx  As per cardio considering doing  device tomorrow   Npo after mdnt  Cardiology consult  Chronic systolic heart failure (HCC)  Wt Readings from Last 3 Encounters:   05/30/25 124 kg (273 lb)   05/20/25 124 kg (273 lb 8 oz)   05/19/25 124 kg (273 lb 3.2 oz)     new dx 6/2023.  EF 43% at that time.  LHC - multivessel CAD  suspect 2/2 CAD + prior anthracycline therapy  Not on exacerbation  Presently on LR 20 cc/hr  Cont monitoring volumed status  Cont Entresto    Coronary artery disease involving native coronary artery of native heart without angina pectoris  New dx on [facility] May 2024.  S/p CABG x 3 in Aug 2024.   Cont ASA, Plavix , Lipitor, Entresto        Hodgkin's lymphoma (HCC)  Dx age 19  Initial treatment included - ABVD.     Relapse 1 year later, had MOPP and chest radiation.  Hx of bone marrow transplant 1991  Pericardial effusion  Due to prior radiation   No evidence of tamponade   NSVT (nonsustained ventricular tachycardia) ([facility])        VTE Pharmacologic Prophylaxis:   SCD  Code Status: Level 1 - Full Code   Discussion with family:  .     Anticipated Length of Stay: Patient will be admitted on an observation basis with an anticipated length of stay of less than 2 midnights secondary to pacemaker plcm.    History of Present Illness   Chief Complaint: pacemeker plcm  Marcelina Ascencio is a 55 y.o. male with a PMH of CAD s/p CABG , CHF, Hodgkin's lymphoma , First degree AVB who was sent in here  a 4.8 second pause on his zio monitor. EKG shows 1st degree block.States he had one episode of lightheadedness over the past few weeks but no dyspnea, no LE edema.  Pt denies any complains.    Review of Systems   Constitutional: Negative.    HENT: Negative.     Respiratory:  Negative.     Cardiovascular: Negative.    Gastrointestinal: Negative.    Genitourinary: Negative.    Neurological: Negative.    Psychiatric/Behavioral: Negative.         Historical Information   Past Medical History[1]  Past Surgical History[2]  Social History[3]  E-Cigarette/Vaping    E-Cigarette Use Never User      E-Cigarette/Vaping Substances    Nicotine No     THC No     CBD No     Flavoring No     Other No     Unknown No        Social History:  Marital Status: /Civil Union   Occupation:   Patient Pre-hospital Living Situation: Home  Patient Pre-hospital Level of Mobility: walks  Patient Pre-hospital Diet Restrictions:     Meds/Allergies   I have reviewed home medications with patient personally.  Prior to Admission medications    Medication Sig Start Date End Date Taking? Authorizing Provider   aspirin 81 mg chewable tablet Chew 1 tablet (81 mg total) daily 5/7/24  Yes Nika Willis PA-C   clopidogrel (PLAVIX) 75 mg tablet TAKE 1 TABLET BY MOUTH EVERY DAY 5/23/25  Yes Mayco Nayak MD   atorvastatin (LIPITOR) 80 mg tablet TAKE 1 TABLET BY MOUTH EVERY DAY 4/25/25   Mayco Nayak MD   dapagliflozin (Farxiga) 10 MG tablet Take 1 tablet (10 mg total) by mouth daily 12/26/24   Keshav Butler MD   doxycycline hyclate (VIBRAMYCIN) 100 mg capsule Take 1 capsule (100 mg total) by mouth every 12 (twelve) hours for 21 days 5/30/25 6/20/25  Antony Lloyd MD   levothyroxine (Synthroid) 25 mcg tablet TAKE 1 TABLET (25 MCG TOTAL) BY MOUTH DAILY IN THE EARLY MORNING 6/8/25   TWIN Reyes   metoprolol succinate (TOPROL-XL) 25 mg 24 hr tablet TAKE 1 TABLET BY MOUTH EVERY DAY 5/23/25   Mayco Nayak MD   nitroglycerin (NITROSTAT) 0.4 mg SL tablet Place 1 tablet (0.4 mg total) under the tongue every 5 (five) minutes as needed for chest pain 5/7/24   Nika Willis PA-C   sacubitril-valsartan (Entresto) 24-26 MG TABS Take 1 tablet by mouth 2 (two) times a day 10/9/24   Mayco  Isrrael Nayak MD     Allergies   Allergen Reactions    Pollen Extract Eye Swelling     Reports allergy to pollens during the summer       Objective :  Temp:  [97.8 °F (36.6 °C)] 97.8 °F (36.6 °C)  HR:  [89] 89  BP: (129)/(76) 129/76  Resp:  [20] 20  SpO2:  [96 %] 96 %  O2 Device: None (Room air)    Physical Exam  Constitutional:       General: He is not in acute distress.     Appearance: Normal appearance. He is normal weight. He is not ill-appearing, toxic-appearing or diaphoretic.   HENT:      Head: Normocephalic and atraumatic.      Mouth/Throat:      Mouth: Mucous membranes are moist.     Eyes:      Extraocular Movements: Extraocular movements intact.      Pupils: Pupils are equal, round, and reactive to light.       Cardiovascular:      Rate and Rhythm: Normal rate and regular rhythm.      Pulses: Normal pulses.      Heart sounds: Normal heart sounds.   Pulmonary:      Effort: Pulmonary effort is normal.      Breath sounds: Normal breath sounds.   Abdominal:      General: Abdomen is flat.      Palpations: Abdomen is soft.     Musculoskeletal:         General: Normal range of motion.      Cervical back: Normal range of motion and neck supple.     Skin:     General: Skin is warm and dry.      Capillary Refill: Capillary refill takes less than 2 seconds.     Neurological:      General: No focal deficit present.      Mental Status: He is alert and oriented to person, place, and time. Mental status is at baseline.     Psychiatric:         Mood and Affect: Mood normal.         Behavior: Behavior normal.          Lines/Drains:            Lab Results: I have reviewed the following results:  Results from last 7 days   Lab Units 06/19/25  1448   WBC Thousand/uL 6.13   HEMOGLOBIN g/dL 12.6   HEMATOCRIT % 37.5   PLATELETS Thousands/uL 209   SEGS PCT % 73   LYMPHO PCT % 16   MONO PCT % 8   EOS PCT % 1     Results from last 7 days   Lab Units 06/19/25  1448   SODIUM mmol/L 141   POTASSIUM mmol/L 4.0   CHLORIDE mmol/L 107    CO2 mmol/L 27   BUN mg/dL 18   CREATININE mg/dL 0.95   ANION GAP mmol/L 7   CALCIUM mg/dL 9.3   ALBUMIN g/dL 4.3   TOTAL BILIRUBIN mg/dL 0.72   ALK PHOS U/L 70   ALT U/L 20   AST U/L 16   GLUCOSE RANDOM mg/dL 93             Lab Results   Component Value Date    HGBA1C 6.6 (H) 05/08/2025    HGBA1C 6.0 (H) 05/01/2023    HGBA1C 6.2 (H) 04/22/2021                 Administrative Statements       ** Please Note: This note has been constructed using a voice recognition system. **         [1]   Past Medical History:  Diagnosis Date    1st degree AV block 05/13/2024    CAD, multiple vessel 05/13/2024    Cardiomyopathy (HCC)     Chronic systolic heart failure (HCC) 04/23/2024    Class 2 obesity 05/13/2024    Hodgkin's lymphoma (HCC)     Hypercholesterolemia     Hyperlipidemia     Nephrolithiasis     Pericardial effusion 04/23/2024    Plantar fasciitis 08/28/2018    Pre-diabetes     Primary hypertension 04/23/2024    Radiation-induced heart disease 04/23/2024    RBBB 05/13/2024   [2]   Past Surgical History:  Procedure Laterality Date    BONE MARROW TRANSPLANT  01/01/1991 1991    CARDIAC CATHETERIZATION Left 05/07/2024    Procedure: Cardiac Left Heart Cath;  Surgeon: Jeff Marcano MD;  Location: [facility];  Service: Cardiology    CARDIAC CATHETERIZATION N/A 05/07/2024    Procedure: Cardiac Coronary Angiogram;  Surgeon: Jeff Marcano MD;  Location: [facility];  Service: Cardiology    CARDIAC CATHETERIZATION  05/07/2024    Procedure: Cardiac catheterization;  Surgeon: Jeff Marcano MD;  [facility]: [facility];  Service: Cardiology    COLONOSCOPY      WISDOM TOOTH EXTRACTION     [3]   Social History  Tobacco Use    Smoking status: Light Smoker     Types: Cigars     Passive exposure: Never    Smokeless tobacco: Never    Tobacco comments:     Patient smokes cigars 3-4 times a week   Vaping Use    Vaping status: Never Used   Substance and Sexual Activity    Alcohol use: Yes     Comment: social     Drug use: No    Sexual activity: Yes

## 2025-06-19 NOTE — Clinical Note
The PACER GENERATOR RODOLFO QUAD CRT-P MRI BIV - [id number] device was inserted. The leads were placed into the connector and visually verified to be in correct position. Injury current obtained.

## 2025-06-19 NOTE — ED ATTENDING ATTESTATION
6/19/2025  I, Amandeep Lopez MD, saw and evaluated the patient. I have discussed the patient with the resident/non-physician practitioner and agree with the resident's/non-physician practitioner's findings, Plan of Care, and MDM as documented in the resident's/non-physician practitioner's note, except where noted. All available labs and Radiology studies were reviewed.  I was present for key portions of any procedure(s) performed by the resident/non-physician practitioner and I was immediately available to provide assistance.       At this point I agree with the current assessment done in the Emergency Department.  I have conducted an independent evaluation of this patient a history and physical is as follows:  Cardiomyopathy   2nd to radiation treatments    cabg  in past  sent in because zio patch  had a 5 second pause and was called by cardiologist for evaluation  no symptoms  No complaints of chest pain no cough no shortness of breath no leg pain or leg swelling no syncope no melena or bright red blood per rectum  EXAM:   Const:   well appearing   NAD     HEENT:  NCAT    sclera anicteric conjunctiva pink   throat clear, MMM    Neck:   supple  no meningismus  no jvd   no bruits  no  midline tenderness   Lungs:   clear  CW non-tender   No creiptation  Heart:   RRR no m/g/r  Normal pulses  Abd:   soft nt nd pos bs   Ext:    normal nontender  No edema  Neruo:   CN 2 -12 intact  motor intact 5/5 sensory intact cerebellar intact       Gait normal    IMPRESSION: Arrhythmia sick sinus syndrome versus heart block  PLAN: Cardiology consult    ED Course     EKG shows normal sinus rhythm right axis deviation first-degree AV block right bundle micha block no acute ischemic changes similar to old EKG    Critical Care Time  Procedures

## 2025-06-19 NOTE — ASSESSMENT & PLAN NOTE
Wt Readings from Last 3 Encounters:   05/30/25 124 kg (273 lb)   05/20/25 124 kg (273 lb 8 oz)   05/19/25 124 kg (273 lb 3.2 oz)   Mixed ischemic and nonischemic  Diagnosed 6/23 with EF 43% at time  Likely in the setting of CAD and anthracycline therapy  GDMT Toprol XL 25 mg daily, Entresto 24-26 twice daily, Jardiance  Recheck new echocardiogram for repeat EF

## 2025-06-19 NOTE — ASSESSMENT & PLAN NOTE
Dx age 19  Initial treatment included - ABVD.     Relapse 1 year later, had MOPP and chest radiation.  Hx of bone marrow transplant 1991

## 2025-06-19 NOTE — Clinical Note
Case was discussed with TATYANA and the patient's admission status was agreed to be Admission Status: inpatient status.

## 2025-06-19 NOTE — ASSESSMENT & PLAN NOTE
Diagnosed on left heart cath May 2024  S/p CABG x 3 in August 2024 with LIMA to LAD, SVG to radial RPDA, radial OM with PERLA clip  On aspirin and Plavix maintain DAPT for 1 year  On statin  Was on dual for radial artery spasm stop secondary to first-degree AV block  Consider restarting after pacer in place

## 2025-06-19 NOTE — ASSESSMENT & PLAN NOTE
Pacemaker placement  Obs on tele  Hold chemo DVT ppx  As per cardio considering doing  device tomorrow   Npo after mdnt  Cardiology consult

## 2025-06-19 NOTE — TELEPHONE ENCOUNTER
Patient has surgery scheduled for tomorrow, and called to check about instructions, etc. Advised patient OR will call between 2-8 p.m. today.

## 2025-06-19 NOTE — ASSESSMENT & PLAN NOTE
Noted with high degree AV block on Zio monitor placed for first-degree AV block  5-second pause  On metoprolol for AV jordan blocking agents  Recommended for ER presentation for EP evaluation for pacemaker  Last echo EF 40%  Will need either CRT-P versus CRT-D  Need new echo to further evaluate given last echo around 1 year ago  Consider EPS to determine if ICD warranted  NPO at midnight may be able to accommodate tomorrow.

## 2025-06-20 ENCOUNTER — APPOINTMENT (OUTPATIENT)
Dept: NON INVASIVE DIAGNOSTICS | Facility: HOSPITAL | Age: 55
End: 2025-06-20
Payer: COMMERCIAL

## 2025-06-20 ENCOUNTER — ANESTHESIA (OUTPATIENT)
Dept: NON INVASIVE DIAGNOSTICS | Facility: HOSPITAL | Age: 55
End: 2025-06-20
Payer: COMMERCIAL

## 2025-06-20 ENCOUNTER — ANESTHESIA EVENT (OUTPATIENT)
Dept: NON INVASIVE DIAGNOSTICS | Facility: HOSPITAL | Age: 55
End: 2025-06-20
Payer: COMMERCIAL

## 2025-06-20 LAB
ANION GAP SERPL CALCULATED.3IONS-SCNC: 6 MMOL/L (ref 4–13)
AORTIC ROOT: 3.6 CM
AORTIC VALVE MEAN VELOCITY: 16 M/S
ASCENDING AORTA: 3 CM
ATRIAL RATE: 73 BPM
ATRIAL RATE: 88 BPM
AV AREA BY CONTINUOUS VTI: 1.6 CM2
AV AREA PEAK VELOCITY: 1.6 CM2
AV LVOT MEAN GRADIENT: 2 MMHG
AV LVOT PEAK GRADIENT: 3 MMHG
AV MEAN PRESS GRAD SYS DOP V1V2: 11 MMHG
AV ORIFICE AREA US: 1.55 CM2
AV PEAK GRADIENT: 20 MMHG
AV REGURGITATION PRESSURE HALF TIME: 592 MS
AV VELOCITY RATIO: 0.41
AV VMAX SYS DOP: 2.21 M/S
BSA FOR ECHO PROCEDURE: 2.46 M2
BUN SERPL-MCNC: 16 MG/DL (ref 5–25)
CALCIUM SERPL-MCNC: 8.9 MG/DL (ref 8.4–10.2)
CHLORIDE SERPL-SCNC: 105 MMOL/L (ref 96–108)
CO2 SERPL-SCNC: 28 MMOL/L (ref 21–32)
CREAT SERPL-MCNC: 0.86 MG/DL (ref 0.6–1.3)
DOP CALC AO VTI: 51.18 CM
DOP CALC LVOT AREA: 3.8 CM2
DOP CALC LVOT CARDIAC INDEX: 2.12 L/MIN/M2
DOP CALC LVOT CARDIAC OUTPUT: 5.23 L/MIN
DOP CALC LVOT DIAMETER: 2.2 CM
DOP CALC LVOT PEAK VEL VTI: 20.89 CM
DOP CALC LVOT PEAK VEL: 0.92 M/S
DOP CALC LVOT STROKE INDEX: 30.9 ML/M2
DOP CALC LVOT STROKE VOLUME: 79.37
E WAVE DECELERATION TIME: 152 MS
E/A RATIO: 1.25
ERYTHROCYTE [DISTWIDTH] IN BLOOD BY AUTOMATED COUNT: 13 % (ref 11.6–15.1)
FRACTIONAL SHORTENING: 18 (ref 28–44)
GFR SERPL CREATININE-BSD FRML MDRD: 98 ML/MIN/1.73SQ M
GLUCOSE SERPL-MCNC: 96 MG/DL (ref 65–140)
HCT VFR BLD AUTO: 39.4 % (ref 36.5–49.3)
HGB BLD-MCNC: 12.7 G/DL (ref 12–17)
INR PPP: 0.99 (ref 0.85–1.19)
INTERVENTRICULAR SEPTUM IN DIASTOLE (PARASTERNAL SHORT AXIS VIEW): 1 CM
INTERVENTRICULAR SEPTUM: 1 CM (ref 0.6–1.1)
LAAS-AP2: 22.6 CM2
LAAS-AP4: 24.8 CM2
LEFT ATRIUM SIZE: 4.2 CM
LEFT ATRIUM VOLUME (MOD BIPLANE): 81 ML
LEFT ATRIUM VOLUME INDEX (MOD BIPLANE): 32.9 ML/M2
LEFT INTERNAL DIMENSION IN SYSTOLE: 4.7 CM (ref 2.1–4)
LEFT VENTRICLE DIASTOLIC VOLUME (MOD BIPLANE): 146 ML
LEFT VENTRICLE DIASTOLIC VOLUME INDEX (MOD BIPLANE): 59.3 ML/M2
LEFT VENTRICLE SYSTOLIC VOLUME (MOD BIPLANE): 76 ML
LEFT VENTRICLE SYSTOLIC VOLUME INDEX (MOD BIPLANE): 30.9 ML/M2
LEFT VENTRICULAR INTERNAL DIMENSION IN DIASTOLE: 5.7 CM (ref 3.5–6)
LEFT VENTRICULAR POSTERIOR WALL IN END DIASTOLE: 1 CM
LEFT VENTRICULAR STROKE VOLUME: 57 ML
LV EF BIPLANE MOD: 48 %
LV EF US.2D.A4C+ESTIMATED: 49 %
LVSV (TEICH): 57 ML
MCH RBC QN AUTO: 30.3 PG (ref 26.8–34.3)
MCHC RBC AUTO-ENTMCNC: 32.2 G/DL (ref 31.4–37.4)
MCV RBC AUTO: 94 FL (ref 82–98)
MV E'TISSUE VEL-LAT: 9 CM/S
MV E'TISSUE VEL-SEP: 5 CM/S
MV PEAK A VEL: 0.87 M/S
MV PEAK E VEL: 109 CM/S
MV STENOSIS PRESSURE HALF TIME: 44 MS
MV VALVE AREA P 1/2 METHOD: 5
P AXIS: 30 DEGREES
P AXIS: 80 DEGREES
PLATELET # BLD AUTO: 193 THOUSANDS/UL (ref 149–390)
PMV BLD AUTO: 10.9 FL (ref 8.9–12.7)
POTASSIUM SERPL-SCNC: 3.7 MMOL/L (ref 3.5–5.3)
PR INTERVAL: 234 MS
PR INTERVAL: 274 MS
PROTHROMBIN TIME: 13.4 SECONDS (ref 12.3–15)
QRS AXIS: -7 DEGREES
QRS AXIS: 93 DEGREES
QRSD INTERVAL: 150 MS
QRSD INTERVAL: 150 MS
QT INTERVAL: 418 MS
QT INTERVAL: 450 MS
QTC INTERVAL: 496 MS
QTC INTERVAL: 506 MS
RA PRESSURE ESTIMATED: 3 MMHG
RBC # BLD AUTO: 4.19 MILLION/UL (ref 3.88–5.62)
RIGHT ATRIAL 2D VOLUME: 77 ML
RIGHT ATRIUM AREA SYSTOLE A4C: 23.4 CM2
RIGHT VENTRICLE ID DIMENSION: 4 CM
SL CV AV DECELERATION TIME RETROGRADE: 2043 MS
SL CV AV PEAK GRADIENT RETROGRADE: 84 MMHG
SL CV LEFT ATRIUM LENGTH A2C: 5.4 CM
SL CV LV EF: 48
SL CV PED ECHO LEFT VENTRICLE DIASTOLIC VOLUME (MOD BIPLANE) 2D: 160 ML
SL CV PED ECHO LEFT VENTRICLE SYSTOLIC VOLUME (MOD BIPLANE) 2D: 103 ML
SODIUM SERPL-SCNC: 139 MMOL/L (ref 135–147)
T WAVE AXIS: 30 DEGREES
T WAVE AXIS: 57 DEGREES
TRICUSPID ANNULAR PLANE SYSTOLIC EXCURSION: 1.4 CM
VENTRICULAR RATE: 73 BPM
VENTRICULAR RATE: 88 BPM
WBC # BLD AUTO: 5.63 THOUSAND/UL (ref 4.31–10.16)

## 2025-06-20 PROCEDURE — 93306 TTE W/DOPPLER COMPLETE: CPT

## 2025-06-20 PROCEDURE — C1898 LEAD, PMKR, OTHER THAN TRANS: HCPCS | Performed by: INTERNAL MEDICINE

## 2025-06-20 PROCEDURE — C1887 CATHETER, GUIDING: HCPCS | Performed by: INTERNAL MEDICINE

## 2025-06-20 PROCEDURE — C1900 LEAD, CORONARY VENOUS: HCPCS | Performed by: INTERNAL MEDICINE

## 2025-06-20 PROCEDURE — 85610 PROTHROMBIN TIME: CPT | Performed by: PHYSICIAN ASSISTANT

## 2025-06-20 PROCEDURE — 93010 ELECTROCARDIOGRAM REPORT: CPT | Performed by: INTERNAL MEDICINE

## 2025-06-20 PROCEDURE — 80048 BASIC METABOLIC PNL TOTAL CA: CPT | Performed by: PHYSICIAN ASSISTANT

## 2025-06-20 PROCEDURE — 93306 TTE W/DOPPLER COMPLETE: CPT | Performed by: STUDENT IN AN ORGANIZED HEALTH CARE EDUCATION/TRAINING PROGRAM

## 2025-06-20 PROCEDURE — C1892 INTRO/SHEATH,FIXED,PEEL-AWAY: HCPCS | Performed by: INTERNAL MEDICINE

## 2025-06-20 PROCEDURE — 33225 L VENTRIC PACING LEAD ADD-ON: CPT | Performed by: INTERNAL MEDICINE

## 2025-06-20 PROCEDURE — 33208 INSRT HEART PM ATRIAL & VENT: CPT | Performed by: INTERNAL MEDICINE

## 2025-06-20 PROCEDURE — 93005 ELECTROCARDIOGRAM TRACING: CPT

## 2025-06-20 PROCEDURE — C2621 PMKR, OTHER THAN SING/DUAL: HCPCS | Performed by: INTERNAL MEDICINE

## 2025-06-20 PROCEDURE — C1730 CATH, EP, 19 OR FEW ELECT: HCPCS | Performed by: INTERNAL MEDICINE

## 2025-06-20 PROCEDURE — 85027 COMPLETE CBC AUTOMATED: CPT | Performed by: PHYSICIAN ASSISTANT

## 2025-06-20 PROCEDURE — 99232 SBSQ HOSP IP/OBS MODERATE 35: CPT

## 2025-06-20 PROCEDURE — C1769 GUIDE WIRE: HCPCS | Performed by: INTERNAL MEDICINE

## 2025-06-20 DEVICE — LEAD 5076-52 MRI US RCMCRD
Type: IMPLANTABLE DEVICE | Site: HEART | Status: FUNCTIONAL
Brand: CAPSUREFIX NOVUS MRI™ SURESCAN®

## 2025-06-20 DEVICE — CRTP W4TR02 SERENA QUAD CRTP MRI US
Type: IMPLANTABLE DEVICE | Site: CHEST  WALL | Status: FUNCTIONAL
Brand: SERENA™ QUAD CRT-P MRI SURESCAN™

## 2025-06-20 DEVICE — LEAD 3830 US MKT/ 69CM MRI LBBAP
Type: IMPLANTABLE DEVICE | Site: HEART | Status: FUNCTIONAL
Brand: SELECTSECURE™ MRI SURESCAN™

## 2025-06-20 DEVICE — LEAD 459888 MRI S-TIP US
Type: IMPLANTABLE DEVICE | Site: HEART | Status: FUNCTIONAL
Brand: ATTAIN PERFORMA™ S MRI SURESCAN™

## 2025-06-20 DEVICE — ENVELOPE CMRM6122 ABSORB MED MR
Type: IMPLANTABLE DEVICE | Site: CHEST  WALL | Status: FUNCTIONAL
Brand: TYRX™

## 2025-06-20 RX ORDER — PROPOFOL 10 MG/ML
INJECTION, EMULSION INTRAVENOUS CONTINUOUS PRN
Status: DISCONTINUED | OUTPATIENT
Start: 2025-06-20 | End: 2025-06-20

## 2025-06-20 RX ORDER — ACETAMINOPHEN 325 MG/1
650 TABLET ORAL EVERY 6 HOURS PRN
Status: DISCONTINUED | OUTPATIENT
Start: 2025-06-20 | End: 2025-06-21 | Stop reason: HOSPADM

## 2025-06-20 RX ORDER — GENTAMICIN 40 MG/ML
INJECTION, SOLUTION INTRAMUSCULAR; INTRAVENOUS CODE/TRAUMA/SEDATION MEDICATION
Status: DISCONTINUED | OUTPATIENT
Start: 2025-06-20 | End: 2025-06-20 | Stop reason: HOSPADM

## 2025-06-20 RX ORDER — FENTANYL CITRATE 50 UG/ML
INJECTION, SOLUTION INTRAMUSCULAR; INTRAVENOUS AS NEEDED
Status: DISCONTINUED | OUTPATIENT
Start: 2025-06-20 | End: 2025-06-20

## 2025-06-20 RX ORDER — METOPROLOL SUCCINATE 50 MG/1
50 TABLET, EXTENDED RELEASE ORAL DAILY
Status: DISCONTINUED | OUTPATIENT
Start: 2025-06-21 | End: 2025-06-21 | Stop reason: HOSPADM

## 2025-06-20 RX ORDER — SODIUM CHLORIDE 9 MG/ML
INJECTION, SOLUTION INTRAVENOUS CONTINUOUS PRN
Status: DISCONTINUED | OUTPATIENT
Start: 2025-06-20 | End: 2025-06-20

## 2025-06-20 RX ORDER — ACETAMINOPHEN 325 MG/1
650 TABLET ORAL EVERY 4 HOURS PRN
Status: CANCELLED | OUTPATIENT
Start: 2025-06-20

## 2025-06-20 RX ORDER — LIDOCAINE HYDROCHLORIDE 10 MG/ML
INJECTION, SOLUTION EPIDURAL; INFILTRATION; INTRACAUDAL; PERINEURAL CODE/TRAUMA/SEDATION MEDICATION
Status: DISCONTINUED | OUTPATIENT
Start: 2025-06-20 | End: 2025-06-20 | Stop reason: HOSPADM

## 2025-06-20 RX ORDER — MIDAZOLAM HYDROCHLORIDE 2 MG/2ML
INJECTION, SOLUTION INTRAMUSCULAR; INTRAVENOUS AS NEEDED
Status: DISCONTINUED | OUTPATIENT
Start: 2025-06-20 | End: 2025-06-20

## 2025-06-20 RX ADMIN — Medication 2000 MG: at 14:24

## 2025-06-20 RX ADMIN — MIDAZOLAM 2 MG: 1 INJECTION INTRAMUSCULAR; INTRAVENOUS at 14:16

## 2025-06-20 RX ADMIN — ACETAMINOPHEN 650 MG: 325 TABLET ORAL at 20:40

## 2025-06-20 RX ADMIN — ATORVASTATIN CALCIUM 80 MG: 80 TABLET, FILM COATED ORAL at 17:36

## 2025-06-20 RX ADMIN — ASPIRIN 81 MG CHEWABLE TABLET 81 MG: 81 TABLET CHEWABLE at 10:42

## 2025-06-20 RX ADMIN — SACUBITRIL AND VALSARTAN 1 TABLET: 24; 26 TABLET, FILM COATED ORAL at 17:36

## 2025-06-20 RX ADMIN — FENTANYL CITRATE 100 MCG: 50 INJECTION INTRAMUSCULAR; INTRAVENOUS at 14:19

## 2025-06-20 RX ADMIN — PROPOFOL 100 MCG/KG/MIN: 10 INJECTION, EMULSION INTRAVENOUS at 14:18

## 2025-06-20 RX ADMIN — LEVOTHYROXINE SODIUM 25 MCG: 0.03 TABLET ORAL at 06:15

## 2025-06-20 RX ADMIN — METOPROLOL SUCCINATE 25 MG: 25 TABLET, EXTENDED RELEASE ORAL at 10:42

## 2025-06-20 RX ADMIN — PHENYLEPHRINE HYDROCHLORIDE 30 MCG/MIN: 50 INJECTION INTRAVENOUS at 14:27

## 2025-06-20 RX ADMIN — SODIUM CHLORIDE: 0.9 INJECTION, SOLUTION INTRAVENOUS at 14:16

## 2025-06-20 RX ADMIN — CLOPIDOGREL BISULFATE 75 MG: 75 TABLET, FILM COATED ORAL at 10:42

## 2025-06-20 RX ADMIN — PERFLUTREN 0.8 ML/MIN: 6.52 INJECTION, SUSPENSION INTRAVENOUS at 07:35

## 2025-06-20 NOTE — PROGRESS NOTES
Progress Note - Hospitalist   Name: Marcelina Ascencio 55 y.o. male I MRN: [id number]  Unit/Bed#: [facility] 427-01 I Date of Admission: 6/19/2025   Date of Service: 6/20/2025 I Hospital Day: 0    Assessment & Plan  First degree AV block  Pacemaker placement  Monitor on tele  Hold chemo DVT ppx  Pending ICD vs pacer with EP on 6/20  Continue NPO at this time, follow for further recs from EP at this time.   Chronic systolic heart failure (HCC)  Wt Readings from Last 3 Encounters:   06/20/25 124 kg (273 lb 5.9 oz)   05/30/25 124 kg (273 lb)   05/20/25 124 kg (273 lb 8 oz)     new dx 6/2023.  EF 43% at that time.  Repeat echo 6/20: Left ventricular cavity size is normal. Wall thickness is normal. The left ventricular ejection fraction is 48% by biplane measurement. Systolic function is mildly reduced. There is mild global hypokinesis. Diastolic function is mildly abnormal, consistent with grade I (abnormal) relaxation.   [facility] - multivessel CAD  suspect 2/2 CAD + prior anthracycline therapy  Not on exacerbation  Presently on LR 20 cc/hr  Cont monitoring volumed status  Cont Entresto  Coronary artery disease involving native coronary artery of native heart without angina pectoris  New dx on [facility] May 2024.  S/p CABG x 3 in Aug 2024.   Cont ASA, Plavix , Lipitor, Entresto  Hodgkin's lymphoma (HCC)  Dx age 19  Initial treatment included - ABVD.     Relapse 1 year later, had MOPP and chest radiation.  Hx of bone marrow transplant 1991  Pericardial effusion  Due to prior radiation   No evidence of tamponade   NSVT (nonsustained ventricular tachycardia) (HCC)  Noted on outpatient Zio monitor as well  Consider up titration of beta-blockers once pacer placed    VTE Pharmacologic Prophylaxis:   Moderate Risk (Score 3-4) - Pharmacological DVT Prophylaxis Contraindicated. Sequential Compression Devices Ordered.    Mobility:      -[facility] Goal achieved. Continue to encourage appropriate mobility.    Patient Centered Rounds: I performed bedside rounds  with nursing staff today.   Discussions with Specialists or Other Care Team Provider: nursing, case management    Education and Discussions with Family / Patient: Patient declined call to .     Current Length of Stay: 0 day(s)  Current Patient Status: Observation   Certification Statement: The patient will continue to require additional inpatient hospital stay due to pending EP evaluation and procedure  Discharge Plan: Anticipate discharge in 24-48 hrs to home.    Code Status: Level 1 - Full Code    Subjective   Seen and examined today, said that he is feeling well. Said that he never had any complaints, but was told to come in because of what was on his outpatient zio. Denies nausea, vomiting, diarrhea, constipation, dizziness, lightheadedness, chest pain, SOB at this time.     Objective :  Temp:  [97.3 °F (36.3 °C)-97.9 °F (36.6 °C)] 97.9 °F (36.6 °C)  HR:  [72-89] 72  BP: (120-159)/(62-99) 120/74  Resp:  [14-20] 16  SpO2:  [94 %-97 %] 96 %  O2 Device: None (Room air)    Body mass index is 36.07 kg/m².     Input and Output Summary (last 24 hours):     Intake/Output Summary (Last 24 hours) at 6/20/2025 1013  Last data filed at 6/20/2025 0730  Gross per 24 hour   Intake 0 ml   Output 0 ml   Net 0 ml       Physical Exam  Vitals reviewed.   Constitutional:       General: He is not in acute distress.     Appearance: He is obese. He is not ill-appearing or toxic-appearing.   HENT:      Head: Normocephalic and atraumatic.      Mouth/Throat:      Mouth: Mucous membranes are moist.     Cardiovascular:      Rate and Rhythm: Normal rate and regular rhythm.      Heart sounds: No murmur heard.  Pulmonary:      Effort: No respiratory distress.      Breath sounds: No stridor. No wheezing.      Comments: Saturating well on room air currently  Abdominal:      General: There is no distension.      Palpations: Abdomen is soft. There is no mass.      Tenderness: There is no abdominal tenderness.     Musculoskeletal:       Right lower leg: No edema.      Left lower leg: No edema.     Skin:     General: Skin is warm and dry.     Neurological:      Mental Status: He is alert and oriented to person, place, and time.     Psychiatric:         Mood and Affect: Mood normal.         Behavior: Behavior normal.           Lines/Drains:        Telemetry:  Telemetry Orders (From admission, onward)               24 Hour Telemetry Monitoring  Continuous x 24 Hours (Telem)        Expiring   Question:  Reason for 24 Hour Telemetry  Answer:  PCI/EP study (including pacer and ICD implementation), Cardiac surgery, MI, abnormal cardiac cath, and chest pain- rule out MI                     Telemetry Reviewed: 1st degree AV block  Indication for Continued Telemetry Use: Arrthymias requiring medical therapy               Lab Results: I have reviewed the following results:   Results from last 7 days   Lab Units 06/20/25  0526 06/19/25  1448   WBC Thousand/uL 5.63 6.13   HEMOGLOBIN g/dL 12.7 12.6   HEMATOCRIT % 39.4 37.5   PLATELETS Thousands/uL 193 209   SEGS PCT %  --  73   LYMPHO PCT %  --  16   MONO PCT %  --  8   EOS PCT %  --  1     Results from last 7 days   Lab Units 06/20/25  0526 06/19/25  1448   SODIUM mmol/L 139 141   POTASSIUM mmol/L 3.7 4.0   CHLORIDE mmol/L 105 107   CO2 mmol/L 28 27   BUN mg/dL 16 18   CREATININE mg/dL 0.86 0.95   ANION GAP mmol/L 6 7   CALCIUM mg/dL 8.9 9.3   ALBUMIN g/dL  --  4.3   TOTAL BILIRUBIN mg/dL  --  0.72   ALK PHOS U/L  --  70   ALT U/L  --  20   AST U/L  --  16   GLUCOSE RANDOM mg/dL 96 93     Results from last 7 days   Lab Units 06/20/25  0526   INR  0.99                   Recent Cultures (last 7 days):         Imaging Results Review: I reviewed radiology reports from this admission including: Echocardiogram.  Other Study Results Review: EKG was reviewed.  EKG was personally reviewed and my interpretation is: Personally Reviewed. Sinus rhythm with 1st degree AV block noted..    Last 24 Hours Medication List:      Current Facility-Administered Medications:     aspirin chewable tablet 81 mg, Daily    atorvastatin (LIPITOR) tablet 80 mg, Daily    ceFAZolin (ANCEF) 3,000 mg in dextrose 5% 100 ml IVPB, Once    clopidogrel (PLAVIX) tablet 75 mg, Daily    Empagliflozin (JARDIANCE) tablet 10 mg, Daily    lactated ringers infusion, Continuous    levothyroxine tablet 25 mcg, Early Morning    metoprolol succinate (TOPROL-XL) 24 hr tablet 25 mg, Daily    sacubitril-valsartan (ENTRESTO) 24-26 MG per tablet 1 tablet, BID    Administrative Statements   Today, Patient Was Seen By: Lisette Robledo PA-C    **Please Note: This note may have been constructed using a voice recognition system.**

## 2025-06-20 NOTE — ANESTHESIA POSTPROCEDURE EVALUATION
Post-Op Assessment Note    CV Status:  Stable  Pain Score: 1         Mental Status:  Alert and awake   Hydration Status:  Stable   PONV Controlled:  None   Airway Patency:  Patent  Two or more mitigation strategies used for obstructive sleep apnea   Post Op Vitals Reviewed: Yes    No anethesia notable event occurred.    Staff: Anesthesiologist           Last Filed PACU Vitals:  Vitals Value Taken Time   Temp     Pulse 73 06/20/25 16:25   /90 06/20/25 16:05   Resp     SpO2 97 % 06/20/25 16:25   Vitals shown include unfiled device data.

## 2025-06-20 NOTE — ASSESSMENT & PLAN NOTE
Wt Readings from Last 3 Encounters:   06/20/25 124 kg (273 lb 5.9 oz)   05/30/25 124 kg (273 lb)   05/20/25 124 kg (273 lb 8 oz)     new dx 6/2023.  EF 43% at that time.  Repeat echo 6/20: Left ventricular cavity size is normal. Wall thickness is normal. The left ventricular ejection fraction is 48% by biplane measurement. Systolic function is mildly reduced. There is mild global hypokinesis. Diastolic function is mildly abnormal, consistent with grade I (abnormal) relaxation.   [facility] - multivessel CAD  suspect 2/2 CAD + prior anthracycline therapy  Not on exacerbation  Presently on LR 20 cc/hr  Cont monitoring volumed status  Cont Entresto

## 2025-06-20 NOTE — ASSESSMENT & PLAN NOTE
New dx on [facility] May 2024.  S/p CABG x 3 in Aug 2024.   Cont ASA, Plavix , Lipitor, Entresto

## 2025-06-20 NOTE — ANESTHESIA PREPROCEDURE EVALUATION
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     Dental   No notable dental hx     Pulmonary  Pulmonary exam normal Breath sounds clear to auscultation    Neurological    He appears awake, alert and oriented x3.      Other Findings        Anesthesia Plan  ASA Score- 3     Anesthesia Type- IV sedation with anesthesia with ASA Monitors.         Additional Monitors:     Airway Plan: natural airway.           Plan Factors-Exercise tolerance (METS): >4 METS.    Chart reviewed. EKG reviewed. Imaging results reviewed. Existing labs reviewed. Patient summary reviewed.    Patient is not a current smoker.      Obstructive sleep apnea risk education given perioperatively.        Induction-     Postoperative Plan- Plan for postoperative opioid use.   Monitoring Plan - Monitoring plan - standard ASA monitoring  Post Operative Pain Plan - plan for postoperative opioid use    Perioperative Resuscitation Plan - Level 1 - Full Code.       Informed Consent- Anesthetic plan and risks discussed with patient.  I personally reviewed this patient with the CRNA. Discussed and agreed on the Anesthesia Plan with the CRNA..      NPO Status:  Vitals Value Taken Time   Date of last liquid 06/20/25 06/20/25 13:55   Time of last liquid 1000 06/20/25 13:55   Date of last solid 06/19/25 06/20/25 13:55   Time of last solid 1630 06/20/25 13:55

## 2025-06-20 NOTE — ASSESSMENT & PLAN NOTE
Pacemaker placement  Monitor on tele  Hold chemo DVT ppx  Pending ICD vs pacer with EP on 6/20  Continue NPO at this time, follow for further recs from EP at this time.

## 2025-06-20 NOTE — ASSESSMENT & PLAN NOTE
Noted with high degree AV block on Zio monitor placed for first-degree AV block with 5-second pause  On metoprolol for AV jordan blocking agents  Recommended for ER presentation for EP evaluation for pacemaker  ECHO- Left Ventricle: Left ventricular cavity size is normal. Wall thickness is normal. The left ventricular ejection fraction is 48% by biplane measurement. Systolic function is mildly reduced. There is mild global hypokinesis. Diastolic function is mildly abnormal, consistent with grade I (abnormal) relaxation   S/p BiV ICD MDT 6/20

## 2025-06-21 ENCOUNTER — APPOINTMENT (OUTPATIENT)
Dept: RADIOLOGY | Facility: HOSPITAL | Age: 55
End: 2025-06-21
Payer: COMMERCIAL

## 2025-06-21 VITALS
BODY MASS INDEX: 36.23 KG/M2 | HEIGHT: 73 IN | WEIGHT: 273.37 LBS | SYSTOLIC BLOOD PRESSURE: 134 MMHG | TEMPERATURE: 98.3 F | DIASTOLIC BLOOD PRESSURE: 76 MMHG | RESPIRATION RATE: 16 BRPM | HEART RATE: 89 BPM | OXYGEN SATURATION: 96 %

## 2025-06-21 LAB
ANION GAP SERPL CALCULATED.3IONS-SCNC: 6 MMOL/L (ref 4–13)
ATRIAL RATE: 72 BPM
BUN SERPL-MCNC: 18 MG/DL (ref 5–25)
CALCIUM SERPL-MCNC: 9.1 MG/DL (ref 8.4–10.2)
CHLORIDE SERPL-SCNC: 103 MMOL/L (ref 96–108)
CO2 SERPL-SCNC: 30 MMOL/L (ref 21–32)
CREAT SERPL-MCNC: 0.9 MG/DL (ref 0.6–1.3)
ERYTHROCYTE [DISTWIDTH] IN BLOOD BY AUTOMATED COUNT: 13 % (ref 11.6–15.1)
GFR SERPL CREATININE-BSD FRML MDRD: 96 ML/MIN/1.73SQ M
GLUCOSE P FAST SERPL-MCNC: 103 MG/DL (ref 65–99)
GLUCOSE SERPL-MCNC: 103 MG/DL (ref 65–140)
HCT VFR BLD AUTO: 43.5 % (ref 36.5–49.3)
HGB BLD-MCNC: 14.2 G/DL (ref 12–17)
MCH RBC QN AUTO: 30.5 PG (ref 26.8–34.3)
MCHC RBC AUTO-ENTMCNC: 32.6 G/DL (ref 31.4–37.4)
MCV RBC AUTO: 93 FL (ref 82–98)
P AXIS: 12 DEGREES
PLATELET # BLD AUTO: 229 THOUSANDS/UL (ref 149–390)
PMV BLD AUTO: 10.9 FL (ref 8.9–12.7)
POTASSIUM SERPL-SCNC: 4.6 MMOL/L (ref 3.5–5.3)
PR INTERVAL: 142 MS
QRS AXIS: 197 DEGREES
QRSD INTERVAL: 132 MS
QT INTERVAL: 460 MS
QTC INTERVAL: 504 MS
RBC # BLD AUTO: 4.66 MILLION/UL (ref 3.88–5.62)
SODIUM SERPL-SCNC: 139 MMOL/L (ref 135–147)
T WAVE AXIS: 32 DEGREES
VENTRICULAR RATE: 72 BPM
WBC # BLD AUTO: 7.83 THOUSAND/UL (ref 4.31–10.16)

## 2025-06-21 PROCEDURE — 85027 COMPLETE CBC AUTOMATED: CPT

## 2025-06-21 PROCEDURE — 99024 POSTOP FOLLOW-UP VISIT: CPT | Performed by: PHYSICIAN ASSISTANT

## 2025-06-21 PROCEDURE — 80048 BASIC METABOLIC PNL TOTAL CA: CPT

## 2025-06-21 PROCEDURE — 71046 X-RAY EXAM CHEST 2 VIEWS: CPT

## 2025-06-21 PROCEDURE — 99239 HOSP IP/OBS DSCHRG MGMT >30: CPT | Performed by: PHYSICIAN ASSISTANT

## 2025-06-21 PROCEDURE — 93010 ELECTROCARDIOGRAM REPORT: CPT | Performed by: INTERNAL MEDICINE

## 2025-06-21 RX ORDER — ACETAMINOPHEN 325 MG/1
650 TABLET ORAL EVERY 6 HOURS PRN
Start: 2025-06-21

## 2025-06-21 RX ADMIN — LEVOTHYROXINE SODIUM 25 MCG: 0.03 TABLET ORAL at 06:15

## 2025-06-21 RX ADMIN — CLOPIDOGREL BISULFATE 75 MG: 75 TABLET, FILM COATED ORAL at 09:11

## 2025-06-21 RX ADMIN — METOPROLOL SUCCINATE 50 MG: 50 TABLET, EXTENDED RELEASE ORAL at 09:11

## 2025-06-21 RX ADMIN — ASPIRIN 81 MG CHEWABLE TABLET 81 MG: 81 TABLET CHEWABLE at 09:11

## 2025-06-21 RX ADMIN — SACUBITRIL AND VALSARTAN 1 TABLET: 24; 26 TABLET, FILM COATED ORAL at 09:11

## 2025-06-21 NOTE — ASSESSMENT & PLAN NOTE
Noted with high degree AV block on Zio monitor placed for first-degree AV block  5-second pause  On metoprolol for AV jordan blocking agents  Recommended for ER presentation for EP evaluation for pacemaker  Last echo EF 48%  S/p BiV ICD MDT   CXR without pneumothorax  Discussed restrictions   Lead parameters stable   Outpatient follow up in 2 weeks  Clear for discharge

## 2025-06-21 NOTE — DISCHARGE INSTR - AVS FIRST PAGE
Post Procedure Care instructions:     Pain management   Tylenol (acetaminophen) and ice      If you go home on the day of the procedure:  Please send device transmission the following morning      Wound care  For 7 days:  Bandage must remain on wound   Bathing:    If bandage has a good seal on all sides, you may shower   If the bandage is not sealed or there is any question/concern, do not shower. Sponge bathe only     One week after procedure:   Remove bandage    Do not use lotions/powders/creams on incision   Shower normally, do not scrub wound      Restrictions for 6 weeks: (apply these rules only to the arm closest to the incision)   Do not raise elbow above shoulder height   Do not lift greater than 10lbs    No pulling on grab bar or bed rail    When to call clinic:    Redness or swelling at incision site   Opening and/or drainage from the incision   Temperature >100.4 F     If you have any questions:   [phone] 8:30am-5:30pm  [phone] After hours  [phone] Appointments     Information about your cardiac device:   Implantable Cardioverter Defibrillator (ICD)       If you are shocked:      A shock may feel like someone has hit you, or you may feel a thump in the chest. If someone is touching you when you get a shock, they may feel a tingling feeling   Sit or lie down and stay calm. Ask someone to stay with you if possible   Please either call your cardiologist or report to an emergency room     Cardiac Resynchronization Therapy (CRT)     Your device is a biventricular ICD, which delivers resynchronization therapy and is also a defibrillator (see below). Cardiac resynchronization therapy (CRT) is a procedure used to treat problems with how your heart contracts. CRT is also called biventricular pacing. Your heart has 2 upper chambers, called atria, and 2 lower chambers, called ventricles. Your heartbeat is synchronized when all areas of your heart contract together properly. When the areas of your  heart do not contract as they should, your heart cannot pump enough blood and oxygen to your body. You may have trouble breathing, tire easily, and have swelling in your legs and feet. With a biventricular device, there is one wire in the right atria (in most cases), one wire in the right ventricle, and a third wire that goes through a vein and wraps around to your left ventricle. The two ventricular wires work together to help your heart contract more synchronously and efficiently.      WHAT YOU SHOULD KNOW:       Your device is a biventricular ICD, which delivers resynchronization therapy (see above) and is also a defibrillator. An implantable cardioverter defibrillator (ICD) is a small device that monitors your heart rate and rhythm. It is commonly placed inside your upper chest region. It may be used if you have a ventricular arrhythmia, which is an irregular, dangerous rhythm from the bottom chamber of your heart. Some arrhythmias may cause your heart to suddenly stop beating. An ICD can give a shock to your heart to make it start beating again, or it can give pacing therapy (also known as pain-free therapy) to return your heart to normal rhythm. It is also a pacemaker, so it will pace your heart if needed to prevent it from beating too slowly.      Frequently asked questions about cardiac devices     How long does the device last?    Approximately 10 years, it depends on how often your body uses it     How is my device monitored?    You will have a 2-week follow-up with our device clinic after the initial placement.  At this visit our device techs will establish your follow up appointments   Transmissions are sent every 3 months    Alerts are sent if there are changes to your heart rhythm or the device in between transmissions     Are there long-term restrictions or are there activities that will affect my device?    Generally once healing is completed there are minimal restrictions long-term   When going  through security checkpoints, let them know you have an implanted cardiac device.  You will receive a permeant card, keep this with you   If you have any questions about electronic equipment and your device, please call the device  hotline

## 2025-06-21 NOTE — PROGRESS NOTES
Progress Note - Electrophysiology   Name: Marcelina Ascencio 55 y.o. male I MRN: [id number]  Unit/Bed#: [facility] 427-01 I Date of Admission: 6/19/2025   Date of Service: 6/21/2025 I Hospital Day: 0     Assessment & Plan  AVB (atrioventricular block)  Noted with high degree AV block on Zio monitor placed for first-degree AV block  5-second pause  On metoprolol for AV jordan blocking agents  Recommended for ER presentation for EP evaluation for pacemaker  Last echo EF 48%  S/p BiV ICD MDT   CXR without pneumothorax  Discussed restrictions   Lead parameters stable   Outpatient follow up in 2 weeks  Clear for discharge   Pericardial effusion  Seen on echo 2023 and 2024  No evidence of tamponade  Follow-up repeat echo  NSVT (nonsustained ventricular tachycardia) (HCC)  Noted on outpatient Zio monitor as well  Consider up titration of beta-blockers once pacer placed  Chronic systolic heart failure (HCC)  Wt Readings from Last 3 Encounters:   06/20/25 124 kg (273 lb 5.9 oz)   05/30/25 124 kg (273 lb)   05/20/25 124 kg (273 lb 8 oz)   Mixed ischemic and nonischemic  Diagnosed 6/23 with EF 43% at time  Likely in the setting of CAD and anthracycline therapy  GDMT Toprol XL 25 mg daily, Entresto 24-26 twice daily, Jardiance  Recheck new echocardiogram for repeat EF  Hodgkin's lymphoma (HCC)  Noted history at age 19  Treated in [place]  Initially treated with ABVD with relapse at 1 year had MOPP and chest radiation  Hx of bone marrow transplant 1991  Currently in remission  Coronary artery disease involving native coronary artery of native heart without angina pectoris  Diagnosed on left heart cath May 2024  S/p CABG x 3 in August 2024 with LIMA to LAD, SVG to radial RPDA, radial OM with PERLA clip  On aspirin and Plavix maintain DAPT for 1 year  On statin  Was on dual for radial artery spasm stop secondary to first-degree AV block  Consider restarting after pacer in place  First degree AV block    Primary hypertension      Progress Note -  "Electrophysiology - Cardiology  Marcelina Ascencio 55 y.o. male MRN: [id number]  Unit/Bed#: [facility] 427-01 Encounter: [id number]      Subjective/Objective   Subjective: doing well today without acute complaints       Objective:  Vitals: /82   Pulse 84   Temp 97.6 °F (36.4 °C) (Oral)   Resp 16   Ht 6' 1\" (1.854 m)   Wt 124 kg (273 lb 5.9 oz)   SpO2 94%   BMI 36.07 kg/m²     Vitals:    06/20/25 0730   Weight: 124 kg (273 lb 5.9 oz)     Orthostatic Blood Pressures      Flowsheet Row Most Recent Value   Blood Pressure 135/82 filed at 06/21/2025 0912   Patient Position - Orthostatic VS Lying filed at 06/21/2025 0700              Intake/Output Summary (Last 24 hours) at 6/21/2025 1158  Last data filed at 6/21/2025 0900  Gross per 24 hour   Intake 1400 ml   Output 400 ml   Net 1000 ml       Invasive Devices       Peripheral Intravenous Line  Duration             Peripheral IV 06/19/25 Left Antecubital 1 day    Peripheral IV 06/20/25 Dorsal (posterior);Left Hand <1 day                              Scheduled Meds:  Current Facility-Administered Medications   Medication Dose Route Frequency Provider Last Rate    acetaminophen  650 mg Oral Q6H PRN Florence Morton PA-C      aspirin  81 mg Oral Daily Laurie Rogers MD      atorvastatin  80 mg Oral Daily Laurie Rogers MD      clopidogrel  75 mg Oral Daily Laurie Rogers MD      lactated ringers  20 mL/hr Intravenous Continuous Laurie Rogers MD      levothyroxine  25 mcg Oral Early Morning Laurie Rogers MD      metoprolol succinate  50 mg Oral Daily Rodrigo Stroud MD      sacubitril-valsartan  1 tablet Oral BID Laurie Rogers MD       Continuous Infusions:lactated ringers, 20 mL/hr      PRN Meds:.  acetaminophen    Review of Systems:  Review of Systems   Constitutional: Negative for fever and malaise/fatigue.   Cardiovascular:  Negative for chest pain, dyspnea on exertion, irregular heartbeat and palpitations.   Respiratory:  Negative for shortness " of breath.    Neurological:  Negative for dizziness and light-headedness.   All other systems reviewed and are negative.    ROS as noted above, otherwise 12 point review of systems was performed and is negative.     Physical Exam:   Physical Exam  Vitals and nursing note reviewed.   Constitutional:       General: He is not in acute distress.  HENT:      Head: Normocephalic and atraumatic.     Cardiovascular:      Rate and Rhythm: Normal rate and regular rhythm.   Pulmonary:      Effort: Pulmonary effort is normal. No respiratory distress.     Musculoskeletal:      Right lower leg: No edema.      Left lower leg: No edema.     Skin:     General: Skin is warm and dry.      Coloration: Skin is not jaundiced.     Neurological:      General: No focal deficit present.      Mental Status: He is alert and oriented to person, place, and time.                   Lab Results: I have personally reviewed pertinent lab results.    Results from last 7 days   Lab Units 06/21/25  0601 06/20/25  0526 06/19/25  1448   WBC Thousand/uL 7.83 5.63 6.13   HEMOGLOBIN g/dL 14.2 12.7 12.6   HEMATOCRIT % 43.5 39.4 37.5   PLATELETS Thousands/uL 229 193 209     Results from last 7 days   Lab Units 06/21/25  0601 06/20/25  0526 06/19/25  1448   POTASSIUM mmol/L 4.6 3.7 4.0   CHLORIDE mmol/L 103 105 107   CO2 mmol/L 30 28 27   BUN mg/dL 18 16 18   CREATININE mg/dL 0.90 0.86 0.95   CALCIUM mg/dL 9.1 8.9 9.3     Results from last 7 days   Lab Units 06/20/25  0526   INR  0.99           Imaging: Results Review Statement: No pertinent imaging studies reviewed.  No results found for this or any previous visit.      VTE Pharmacologic Prophylaxis: Sequential compression device (Venodyne)   VTE Mechanical Prophylaxis: sequential compression device

## 2025-06-21 NOTE — ASSESSMENT & PLAN NOTE
Wt Readings from Last 3 Encounters:   06/20/25 124 kg (273 lb 5.9 oz)   05/30/25 124 kg (273 lb)   05/20/25 124 kg (273 lb 8 oz)     new dx 6/2023.  EF 43% at that time.  Repeat echo 6/20: Left ventricular cavity size is normal. Wall thickness is normal. The left ventricular ejection fraction is 48% by biplane measurement. Systolic function is mildly reduced. There is mild global hypokinesis. Diastolic function is mildly abnormal, consistent with grade I (abnormal) relaxation.   [facility] - multivessel CAD  suspect 2/2 CAD + prior anthracycline therapy  Not in exacerbation  Cont Entresto, farxiga on discharge

## 2025-06-21 NOTE — ASSESSMENT & PLAN NOTE
New dx on [facility] May 2024.  S/p CABG x 3 in Aug 2024.   Cont ASA, Plavix , Lipitor, Entresto

## 2025-06-21 NOTE — ASSESSMENT & PLAN NOTE
Wt Readings from Last 3 Encounters:   06/20/25 124 kg (273 lb 5.9 oz)   05/30/25 124 kg (273 lb)   05/20/25 124 kg (273 lb 8 oz)   Mixed ischemic and nonischemic  Diagnosed 6/23 with EF 43% at time  Likely in the setting of CAD and anthracycline therapy  GDMT Toprol XL 25 mg daily, Entresto 24-26 twice daily, Jardiance  Recheck new echocardiogram for repeat EF

## 2025-06-21 NOTE — PLAN OF CARE
Problem: Potential for Falls  Goal: Patient will remain free of falls  Description: INTERVENTIONS:  - Educate patient/family on patient safety including physical limitations  - Instruct patient to call for assistance with activity   - Consider consulting OT/PT to assist with strengthening/mobility based on AM PAC & JH-HLM score  - Consult OT/PT to assist with strengthening/mobility   - Keep Call bell within reach  - Keep bed low and locked with side rails adjusted as appropriate  - Keep care items and personal belongings within reach  - Initiate and maintain comfort rounds  - Make Fall Risk Sign visible to staff  - Offer Toileting every  Hours, in advance of need  - Initiate/Maintain alarm  - Obtain necessary fall risk management equipment:   - Apply yellow socks and bracelet for high fall risk patients  - Consider moving patient to room near nurses station  Outcome: Progressing     Problem: PAIN - ADULT  Goal: Verbalizes/displays adequate comfort level or baseline comfort level  Description: Interventions:  - Encourage patient to monitor pain and request assistance  - Assess pain using appropriate pain scale  - Administer analgesics as ordered based on type and severity of pain and evaluate response  - Implement non-pharmacological measures as appropriate and evaluate response  - Consider cultural and social influences on pain and pain management  - Notify physician/advanced practitioner if interventions unsuccessful or patient reports new pain  - Educate patient/family on pain management process including their role and importance of  reporting pain   - Provide non-pharmacologic/complimentary pain relief interventions  Outcome: Progressing     Problem: INFECTION - ADULT  Goal: Absence or prevention of progression during hospitalization  Description: INTERVENTIONS:  - Assess and monitor for signs and symptoms of infection  - Monitor lab/diagnostic results  - Monitor all insertion sites, i.e. indwelling lines,  tubes, and drains  - Monitor endotracheal if appropriate and nasal secretions for changes in amount and color  - [place] appropriate cooling/warming therapies per order  - Administer medications as ordered  - Instruct and encourage patient and family to use good hand hygiene technique  - Identify and instruct in appropriate isolation precautions for identified infection/condition  Outcome: Progressing  Goal: Absence of fever/infection during neutropenic period  Description: INTERVENTIONS:  - Monitor WBC  - Perform strict hand hygiene  - Limit to healthy visitors only  - No plants, dried, fresh or silk flowers with stewart in patient room  Outcome: Progressing     Problem: SAFETY ADULT  Goal: Patient will remain free of falls  Description: INTERVENTIONS:  - Educate patient/family on patient safety including physical limitations  - Instruct patient to call for assistance with activity   - Consider consulting OT/PT to assist with strengthening/mobility based on AM PAC & -HLM score  - Consult OT/PT to assist with strengthening/mobility   - Keep Call bell within reach  - Keep bed low and locked with side rails adjusted as appropriate  - Keep care items and personal belongings within reach  - Initiate and maintain comfort rounds  - Make Fall Risk Sign visible to staff  - Offer Toileting every  Hours, in advance of need  - Initiate/Maintain alarm  - Obtain necessary fall risk management equipment:   - Apply yellow socks and bracelet for high fall risk patients  - Consider moving patient to room near nurses station  Outcome: Progressing  Goal: Maintain or return to baseline ADL function  Description: INTERVENTIONS:  -  Assess patient's ability to carry out ADLs; assess patient's baseline for ADL function and identify physical deficits which impact ability to perform ADLs (bathing, care of mouth/teeth, toileting, grooming, dressing, etc.)  - Assess/evaluate cause of self-care deficits   - Assess range of motion  - Assess  patient's mobility; develop plan if impaired  - Assess patient's need for assistive devices and provide as appropriate  - Encourage maximum independence but intervene and supervise when necessary  - Involve family in performance of ADLs  - Assess for home care needs following discharge   - Consider OT consult to assist with ADL evaluation and planning for discharge  - Provide patient education as appropriate  - Monitor functional capacity and physical performance, use of AM PAC & JH-HLM   - Monitor gait, balance and fatigue with ambulation    Outcome: Progressing  Goal: Maintains/Returns to pre admission functional level  Description: INTERVENTIONS:  - Perform AM-PAC 6 Click Basic Mobility/ Daily Activity assessment daily.  - Set and communicate daily mobility goal to care team and patient/family/caregiver.   - Collaborate with rehabilitation services on mobility goals if consulted  - Perform Range of Motion  times a day.  - Reposition patient every  hours.  - Dangle patient  times a day  - Stand patient  times a day  - Ambulate patient times a day  - Out of bed to chair  times a day   - Out of bed for meals times a day  - Out of bed for toileting  - Record patient progress and toleration of activity level   Outcome: Progressing

## 2025-06-21 NOTE — DISCHARGE SUMMARY
Discharge Summary - Hospitalist   Name: Marcelina Ascencio 55 y.o. male I MRN: [id number]  Unit/Bed#: [facility] 427-01 I Date of Admission: 6/19/2025   Date of Service: 6/21/2025 I Hospital Day: 0     Assessment & Plan  First degree AV block  Appreciate EP recommendations  S/p BiV ICD MDT   Cleared by EP for discharge home today  Chronic systolic heart failure (HCC)  Wt Readings from Last 3 Encounters:   06/20/25 124 kg (273 lb 5.9 oz)   05/30/25 124 kg (273 lb)   05/20/25 124 kg (273 lb 8 oz)     new dx 6/2023.  EF 43% at that time.  Repeat echo 6/20: Left ventricular cavity size is normal. Wall thickness is normal. The left ventricular ejection fraction is 48% by biplane measurement. Systolic function is mildly reduced. There is mild global hypokinesis. Diastolic function is mildly abnormal, consistent with grade I (abnormal) relaxation.   [facility] - multivessel CAD  suspect 2/2 CAD + prior anthracycline therapy  Not in exacerbation  Cont Entresto, farxiga on discharge  Coronary artery disease involving native coronary artery of native heart without angina pectoris  New dx on [facility] May 2024.  S/p CABG x 3 in Aug 2024.   Cont ASA, Plavix , Lipitor, Entresto  Hodgkin's lymphoma (HCC)  Dx age 19  Initial treatment included - ABVD.     Relapse 1 year later, had MOPP and chest radiation.  Hx of bone marrow transplant 1991  Pericardial effusion  Due to prior radiation   No evidence of tamponade   NSVT (nonsustained ventricular tachycardia) (HCC)  Noted on outpatient Zio monitor as well  Continue metoprolol     Medical Problems       Resolved Problems  Date Reviewed: 6/20/2025   None       Discharging Physician / Practitioner: Theresa Solomon PA-C  PCP: TWIN Reyes  Admission Date:   Admission Orders (From admission, onward)       Ordered        06/19/25 1509  Place in Observation  Once                          Discharge Date: 06/21/25    Next Steps for Physician/AP Assuming Care:  S/p BiV ICD MDT 6/20, will need outpatient  cardiology follow up    Test Results Pending at Discharge (will require follow up):  none    Medication Changes for Discharge & Rationale:   none  See after visit summary for reconciled discharge medications provided to patient and/or family.     Consultations During Hospital Stay:  Cardiology/EP    Procedures Performed:   S/p BiV ICD MDT 6/20/25    Significant Findings / Test Results:   ECHO- Left Ventricle: Left ventricular cavity size is normal. Wall thickness is normal. The left ventricular ejection fraction is 48% by biplane measurement. Systolic function is mildly reduced. There is mild global hypokinesis. Diastolic function is mildly abnormal, consistent with grade I (abnormal) relaxation.     Incidental Findings:   none     Hospital Course:   Marcelina Ascencio is a 55 y.o. male patient who originally presented to the hospital on 6/19/2025 due to first degree AVB seen on Zio monitor. Cardiology/EP was consulted and decision was made for ICD. He had BiV ICD MDT 6/20/25. Lead parameters stable. VS and labs remained stable. He will need outpatient follow up in 2 weeks with cardiology. He was cleared for discharge home today.         Please see above list of diagnoses and related plan for additional information.     Discharge Day Visit / Exam:   Physical Exam  Vitals reviewed.   Constitutional:       General: He is not in acute distress.     Appearance: He is not ill-appearing or diaphoretic.   HENT:      Head: Normocephalic and atraumatic.      Nose: Nose normal.      Mouth/Throat:      Pharynx: Oropharynx is clear.     Cardiovascular:      Rate and Rhythm: Normal rate.   Pulmonary:      Effort: Pulmonary effort is normal. No respiratory distress.   Abdominal:      General: There is no distension.      Palpations: Abdomen is soft.      Tenderness: There is no abdominal tenderness.     Musculoskeletal:         General: No deformity or signs of injury.     Skin:     General: Skin is warm and dry.     Neurological:       Mental Status: He is alert and oriented to person, place, and time.           Discussion with Family: Patient declined call to .     Discharge instructions/Information to patient and family:   See after visit summary for information provided to patient and family.      Provisions for Follow-Up Care:  See after visit summary for information related to follow-up care and any pertinent home health orders.      Mobility at time of Discharge:   Basic Mobility Inpatient Raw Score: 24  JH-HLM Goal: 8: Walk 250 feet or more  JH-HLM Achieved: 6: Walk 10 steps or more       Disposition:   Home    Planned Readmission: no    Administrative Statements   Discharge Statement:  I have spent a total time of 45 minutes in caring for this patient on the day of the visit/encounter. >30 minutes of time was spent on: Diagnostic results, Instructions for management, Patient and family education, Impressions, Counseling / Coordination of care, Documenting in the medical record, Reviewing / ordering tests, medicine, procedures  , and Communicating with other healthcare professionals .    **Please Note: This note may have been constructed using a voice recognition system**

## 2025-06-21 NOTE — PLAN OF CARE
Problem: Potential for Falls  Goal: Patient will remain free of falls  Description: INTERVENTIONS:  - Educate patient/family on patient safety including physical limitations  - Instruct patient to call for assistance with activity   - Consider consulting OT/PT to assist with strengthening/mobility based on AM PAC & JH-HLM score  - Consult OT/PT to assist with strengthening/mobility   - Keep Call bell within reach  - Keep bed low and locked with side rails adjusted as appropriate  - Keep care items and personal belongings within reach  - Initiate and maintain comfort rounds  - Make Fall Risk Sign visible to staff  - Offer Toileting every  Hours, in advance of need  - Initiate/Maintain alarm  - Obtain necessary fall risk management equipment:   - Apply yellow socks and bracelet for high fall risk patients  - Consider moving patient to room near nurses station  6/21/2025 0155 by Vicki Whitten  Outcome: Progressing  6/21/2025 0155 by Vicki Whitten  Outcome: Progressing     Problem: PAIN - ADULT  Goal: Verbalizes/displays adequate comfort level or baseline comfort level  Description: Interventions:  - Encourage patient to monitor pain and request assistance  - Assess pain using appropriate pain scale  - Administer analgesics as ordered based on type and severity of pain and evaluate response  - Implement non-pharmacological measures as appropriate and evaluate response  - Consider cultural and social influences on pain and pain management  - Notify physician/advanced practitioner if interventions unsuccessful or patient reports new pain  - Educate patient/family on pain management process including their role and importance of  reporting pain   - Provide non-pharmacologic/complimentary pain relief interventions  Outcome: Progressing     Problem: INFECTION - ADULT  Goal: Absence or prevention of progression during hospitalization  Description: INTERVENTIONS:  - Assess and monitor for signs and symptoms of  infection  - Monitor lab/diagnostic results  - Monitor all insertion sites, i.e. indwelling lines, tubes, and drains  - Monitor endotracheal if appropriate and nasal secretions for changes in amount and color  - [place] appropriate cooling/warming therapies per order  - Administer medications as ordered  - Instruct and encourage patient and family to use good hand hygiene technique  - Identify and instruct in appropriate isolation precautions for identified infection/condition  Outcome: Progressing  Goal: Absence of fever/infection during neutropenic period  Description: INTERVENTIONS:  - Monitor WBC  - Perform strict hand hygiene  - Limit to healthy visitors only  - No plants, dried, fresh or silk flowers with stewart in patient room  Outcome: Progressing     Problem: SAFETY ADULT  Goal: Patient will remain free of falls  Description: INTERVENTIONS:  - Educate patient/family on patient safety including physical limitations  - Instruct patient to call for assistance with activity   - Consider consulting OT/PT to assist with strengthening/mobility based on AM PAC & -HLM score  - Consult OT/PT to assist with strengthening/mobility   - Keep Call bell within reach  - Keep bed low and locked with side rails adjusted as appropriate  - Keep care items and personal belongings within reach  - Initiate and maintain comfort rounds  - Make Fall Risk Sign visible to staff  - Offer Toileting every  Hours, in advance of need  - Initiate/Maintain alarm  - Obtain necessary fall risk management equipment:   - Apply yellow socks and bracelet for high fall risk patients  - Consider moving patient to room near nurses station  6/21/2025 0155 by Vicki Whitten  Outcome: Progressing  6/21/2025 0155 by Vicki Whitten  Outcome: Progressing  Goal: Maintain or return to baseline ADL function  Description: INTERVENTIONS:  -  Assess patient's ability to carry out ADLs; assess patient's baseline for ADL function and identify physical deficits  which impact ability to perform ADLs (bathing, care of mouth/teeth, toileting, grooming, dressing, etc.)  - Assess/evaluate cause of self-care deficits   - Assess range of motion  - Assess patient's mobility; develop plan if impaired  - Assess patient's need for assistive devices and provide as appropriate  - Encourage maximum independence but intervene and supervise when necessary  - Involve family in performance of ADLs  - Assess for home care needs following discharge   - Consider OT consult to assist with ADL evaluation and planning for discharge  - Provide patient education as appropriate  - Monitor functional capacity and physical performance, use of AM PAC & JH-HLM   - Monitor gait, balance and fatigue with ambulation    Outcome: Progressing  Goal: Maintains/Returns to pre admission functional level  Description: INTERVENTIONS:  - Perform AM-PAC 6 Click Basic Mobility/ Daily Activity assessment daily.  - Set and communicate daily mobility goal to care team and patient/family/caregiver.   - Collaborate with rehabilitation services on mobility goals if consulted  - Perform Range of Motion  times a day.  - Reposition patient every  hours.  - Dangle patient  times a day  - Stand patient  times a day  - Ambulate patient  times a day  - Out of bed to chair  times a day   - Out of bed for meals  times a day  - Out of bed for toileting  - Record patient progress and toleration of activity level   Outcome: Progressing     Problem: DISCHARGE PLANNING  Goal: Discharge to home or other facility with appropriate resources  Description: INTERVENTIONS:  - Identify barriers to discharge w/patient and caregiver  - Arrange for needed discharge resources and transportation as appropriate  - Identify discharge learning needs (meds, wound care, etc.)  - Arrange for interpretive services to assist at discharge as needed  - Refer to Case Management Department for coordinating discharge planning if the patient needs post-hospital  services based on physician/advanced practitioner order or complex needs related to functional status, cognitive ability, or social support system  Outcome: Progressing     Problem: Knowledge Deficit  Goal: Patient/family/caregiver demonstrates understanding of disease process, treatment plan, medications, and discharge instructions  Description: Complete learning assessment and assess knowledge base.  Interventions:  - Provide teaching at level of understanding  - Provide teaching via preferred learning methods  Outcome: Progressing

## 2025-06-23 ENCOUNTER — TRANSITIONAL CARE MANAGEMENT (OUTPATIENT)
Dept: FAMILY MEDICINE CLINIC | Facility: CLINIC | Age: 55
End: 2025-06-23

## 2025-06-27 ENCOUNTER — APPOINTMENT (OUTPATIENT)
Dept: LAB | Facility: HOSPITAL | Age: 55
End: 2025-06-27
Payer: COMMERCIAL

## 2025-06-27 DIAGNOSIS — E03.9 ACQUIRED HYPOTHYROIDISM: ICD-10-CM

## 2025-06-27 LAB — TSH SERPL DL<=0.05 MIU/L-ACNC: 4.23 UIU/ML (ref 0.45–4.5)

## 2025-06-27 PROCEDURE — 84443 ASSAY THYROID STIM HORMONE: CPT

## 2025-06-27 PROCEDURE — 36415 COLL VENOUS BLD VENIPUNCTURE: CPT

## 2025-07-01 ENCOUNTER — OFFICE VISIT (OUTPATIENT)
Dept: FAMILY MEDICINE CLINIC | Facility: CLINIC | Age: 55
End: 2025-07-01
Payer: COMMERCIAL

## 2025-07-01 VITALS
HEIGHT: 73 IN | BODY MASS INDEX: 35.76 KG/M2 | SYSTOLIC BLOOD PRESSURE: 122 MMHG | DIASTOLIC BLOOD PRESSURE: 72 MMHG | WEIGHT: 269.8 LBS | OXYGEN SATURATION: 98 % | RESPIRATION RATE: 18 BRPM | HEART RATE: 82 BPM

## 2025-07-01 DIAGNOSIS — Z09 HOSPITAL DISCHARGE FOLLOW-UP: Primary | ICD-10-CM

## 2025-07-01 DIAGNOSIS — E03.9 ACQUIRED HYPOTHYROIDISM: ICD-10-CM

## 2025-07-01 DIAGNOSIS — I44.0 FIRST DEGREE AV BLOCK: ICD-10-CM

## 2025-07-01 DIAGNOSIS — Z00.00 HEALTHCARE MAINTENANCE: ICD-10-CM

## 2025-07-01 DIAGNOSIS — R73.03 PREDIABETES: ICD-10-CM

## 2025-07-01 DIAGNOSIS — I10 PRIMARY HYPERTENSION: ICD-10-CM

## 2025-07-01 PROBLEM — I44.30 AVB (ATRIOVENTRICULAR BLOCK): Status: RESOLVED | Noted: 2025-06-19 | Resolved: 2025-07-01

## 2025-07-01 PROCEDURE — 99495 TRANSJ CARE MGMT MOD F2F 14D: CPT | Performed by: NURSE PRACTITIONER

## 2025-07-01 NOTE — ASSESSMENT & PLAN NOTE
Discussed continuing on levothyroxine 25 mcg daily.  Reviewed recent thyroid panel which is within normal limits.  Will return in 3 months with blood work prior.

## 2025-07-01 NOTE — PROGRESS NOTES
Transition of Care Visit:  Name: Marcelina Ascencio      : 1970      MRN: [id number]  Encounter Provider: TWIN Reyes  Encounter Date: 2025   Encounter department: [facility] [address]    Assessment & Plan  Hospital discharge follow-up         Healthcare maintenance    Orders:    TSH, 3rd generation with Free T4 reflex; Future    CBC and differential; Future    Comprehensive metabolic panel; Future    Lipid panel; Future    Prediabetes    Orders:    Hemoglobin A1C; Future    First degree AV block  S/p ICD placement.  Continue care with cardiology.  Patient remains asymptomatic.       Primary hypertension  Blood pressure well-managed with current medications.       Acquired hypothyroidism  Discussed continuing on levothyroxine 25 mcg daily.  Reviewed recent thyroid panel which is within normal limits.  Will return in 3 months with blood work prior.            History of Present Illness     Transitional Care Management Review:   Marcelina Ascencio is a 55 y.o. male here for TCM follow up.     During the TCM phone call patient stated:  TCM Call (since 2025)       Date and time call was made  2025  7:07 AM    Hospital care reviewed  Records reviewed    Patient was hospitialized at  [facility]    Date of Admission  25    Date of discharge  25    Diagnosis  First degree AV block    Disposition  Home    Were the patients medications reviewed and updated  Yes    Current Symptoms  None          TCM Call (since 2025)       Post hospital issues  None; Reduced activity    Scheduled for follow up?  Yes    Patients specialists  Cardiology    Did you obtain your prescribed medications  Yes    Do you need help managing your prescriptions or medications  No    Is transportation to your appointment needed  No    I have advised the patient to call PCP with any new or worsening symptoms  kadedra jackson-reyes, NR-CMA    Living Arrangements  Spouse or  "Significiant other    Support System  Family    The type of support provided  Physical    Do you have social support  Yes, as much as I need    Are you recieving home care services  No          Patient presents for TCM follow-up.  Patient was admitted from June 19 to 21 for AV block, with subsequent ICD placement.  Patient is doing well.  Patient does not yet have a follow-up with cardiology.  Cardiology is considering valve replacement for patient in the near future.  Patient denies chest pain, just some mild soreness around pacer placement.      Review of Systems   Constitutional:  Negative for chills, diaphoresis and fever.   HENT:  Negative for ear pain and sore throat.    Eyes:  Negative for pain and visual disturbance.   Respiratory:  Negative for cough and shortness of breath.    Cardiovascular:  Negative for chest pain, palpitations and leg swelling.   Gastrointestinal:  Negative for abdominal pain and vomiting.   Genitourinary:  Negative for dysuria and hematuria.   Musculoskeletal:  Negative for arthralgias and back pain.   Skin:  Negative for color change and rash.   Neurological:  Negative for dizziness, seizures, syncope, light-headedness and headaches.   All other systems reviewed and are negative.    Objective   /72 (BP Location: Left arm, Patient Position: Sitting) Comment: verbalized reading to patient  Pulse 82   Resp 18   Ht 6' 1\" (1.854 m)   Wt 122 kg (269 lb 12.8 oz)   SpO2 98%   BMI 35.60 kg/m²     Physical Exam  Vitals and nursing note reviewed.   Constitutional:       General: He is not in acute distress.     Appearance: He is well-developed.   HENT:      Head: Normocephalic and atraumatic.     Eyes:      Conjunctiva/sclera: Conjunctivae normal.       Cardiovascular:      Rate and Rhythm: Normal rate and regular rhythm.      Heart sounds: Murmur heard.   Pulmonary:      Effort: Pulmonary effort is normal. No respiratory distress.      Breath sounds: Normal breath sounds. "   Abdominal:      Palpations: Abdomen is soft.      Tenderness: There is no abdominal tenderness.     Musculoskeletal:         General: No swelling.      Cervical back: Neck supple.     Skin:     General: Skin is warm and dry.      Capillary Refill: Capillary refill takes less than 2 seconds.     Neurological:      Mental Status: He is alert and oriented to person, place, and time.     Psychiatric:         Mood and Affect: Mood normal.       Medications have been reviewed by provider in current encounter

## 2025-07-02 ENCOUNTER — TELEPHONE (OUTPATIENT)
Age: 55
End: 2025-07-02

## 2025-07-02 NOTE — TELEPHONE ENCOUNTER
Caller: Marcelina Ascencio    Doctor: Dr. Britton    Reason for call: Patient is calling regarding his leave paperwork.  Did the office receive it?  It will need to be completed and faxed back to his HR department before 7/13/2025.  Please call him.    Thank you    Call back#: [phone]

## 2025-07-02 NOTE — LETTER
May 13, 2024     Mayco Nayak MD  [address] [place] [id number]    Patient: Marcelina Ascencio   YOB: 1970   Date of Visit: 5/13/2024       Dear Dr. Nayak:    Thank you for referring Marcelina Ascencio to me for evaluation. Below are my notes for this consultation.    If you have questions, please do not hesitate to call me. I look forward to following your patient along with you.         Sincerely,        Keshav Alex,         CC: No Recipients    Herberth George PA-C  5/13/2024 11:56 AM  Attested  Consultation - Cardiothoracic Surgery   Marcelina Ascencio 54 y.o. male MRN: [id number]    Physician Requesting Consult: Jeff Marcano MD    Primary Cardiologist: Keshav Butler MD    Cardio-oncology/heart failure: Mayco Nayak MD    Primary care provider: Rubén Nicole MD    Reason for Consult / Principal Problem: Coronary artery disease, CABG eval    History of Present Illness: Marcelina Ascencio is a 54 year old male with recently identified cardiomyopathy, chronic systolic heart failure, hypertension, hyperlipidemia, Hodgkin's lymphoma at age 19 treated with chemotherapy with recurrence 1 year later status post chemotherapy and chest radiation and bone marrow transplant '91 (since then considered in remission), nephrolithiasis, 1AVB and RBBB, plantar fasciitis, pre DM last A1c 6.0, class 2 obesity BMI 36.     He had transient dyspnea in 2023 which prompted an echocardiogram on  6/6/23 by his PCP.  This identified newly reduced ejection fraction of 43%, and small pericardial effusion, normal RV size/function, no significant valve disease.  He was then referred to cardiologist Dr. Butler.  His cardiomyopathy was suspected due to his prior chemo or radiation.  He was started on goal-directed medical therapy for his reduced EF with beta-blocker and ACE inhibitor, with planned  repeat echocardiogram and CT calcium score.  Repeat echo 1/2024 again showed reduced LVEF approximately 35%, mild-mod AI,  Pt transferred by ambulance to room 318 for respite care. Pt has been a hospice pt since April. Vitals are stable, pt resting comfortably in bed. Denies pain at this time.    moderate pericardial effusion. His goal-directed medical therapy was uptitrated with and additional meds were added/adjusted, CT calcium score was 1665, and he was referred to cardio oncology/heart failure Dr. Koenig.  His goal-directed medical therapy was again uptitrated/adjusted and also was referred for ischemic evaluation with coronary angiogram given his CT calcium score. He denied anginal symptoms, but admitted to [facility].     His cardiac catheterization was then performed by Dr. Marcano on 5/7/2024 which showed severe left main and multivessel CAD with 60% ostial LM, 70% mid to distal LM, ostial LAD 80%, ostial - proximal circumflex 85%, mid circumflex 60%, and distal RCA 70%.  Repeat echocardiogram 5/7/2024 was again performed and showed LVEF 40% with mild systolic dysfunction and mild global HK and mild diastolic dysfunction, RV size mildly dilated, mild AI/AS, and small to moderate pericardial effusion.  He was referred to cardiac surgery for CABG consideration vs high risk PCI.     Patient denies any recent exertional or rest chest pain. Denies SOB at rest. Admits to some [facility]. No LE edema. No palpitations. Denies syncope. Denies hemoptysis, hematochezia, hematemesis, melena, hematuria.     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  Smokes cigars, rare alcohol use, no drug use.     Past Medical History:  Past Medical History:   Diagnosis Date   • 1st degree AV block 05/13/2024   • CAD, multiple vessel 05/13/2024   • Cardiomyopathy (HCC)    • Chronic systolic heart failure (HCC) 04/23/2024   • Class 2 obesity 05/13/2024   • Hodgkin's lymphoma (HCC)    • Hypercholesterolemia    • Hyperlipidemia    • Nephrolithiasis    • Pericardial effusion 04/23/2024   • Plantar fasciitis 08/28/2018   • Pre-diabetes    • Primary hypertension 04/23/2024   • Radiation-induced heart disease 04/23/2024   • RBBB 05/13/2024       Past Surgical History:   Past Surgical History:   Procedure  Laterality Date   • BONE MARROW TRANSPLANT  01/01/1991 1991   • CARDIAC CATHETERIZATION Left 5/7/2024    Procedure: Cardiac Left Heart Cath;  Surgeon: Jeff Marcano MD;  Location: [facility];  Service: Cardiology   • CARDIAC CATHETERIZATION N/A 5/7/2024    Procedure: Cardiac Coronary Angiogram;  Surgeon: Jeff Marcano MD;  Location: [facility];  Service: Cardiology   • CARDIAC CATHETERIZATION  5/7/2024    Procedure: Cardiac catheterization;  Surgeon: Jeff Marcano MD;  Location: [facility];  Service: Cardiology       Family History:  Family History   Problem Relation Age of Onset   • Hypertension Mother    • Heart attack Father         age 59.  was a heavy drinker.       Social History:    Social History     Substance and Sexual Activity   Alcohol Use Yes    Comment: social     Social History     Substance and Sexual Activity   Drug Use No     Social History     Tobacco Use   Smoking Status Light Smoker   • Types: Cigars   Smokeless Tobacco Never   Tobacco Comments    Patient smokes cigars 3-4 times a week       Home Medications:   Prior to Admission medications    Medication Sig Start Date End Date Taking? Authorizing Provider   aspirin 81 mg chewable tablet Chew 1 tablet (81 mg total) daily 5/7/24   Nika Willis PA-C   atorvastatin (LIPITOR) 40 mg tablet Take 1 tablet (40 mg total) by mouth daily 7/5/23   Keshav Butler MD   dapagliflozin (Farxiga) 10 MG tablet Take 1 tablet (10 mg total) by mouth daily 2/6/24   Keshav Butler MD   metoprolol succinate (TOPROL-XL) 50 mg 24 hr tablet Take 1 tablet (50 mg total) by mouth 2 (two) times a day 4/23/24   Mayco Nayak MD   nitroglycerin (NITROSTAT) 0.4 mg SL tablet Place 1 tablet (0.4 mg total) under the tongue every 5 (five) minutes as needed for chest pain 5/7/24   Nika Willis PA-C   sacubitril-valsartan (Entresto) 49-51 MG TABS Take 1 tablet by mouth 2 (two) times a day 2/8/24   Keshav Butler MD  "      Allergies:  Allergies   Allergen Reactions   • Pollen Extract Eye Swelling     Reports allergy to pollens during the summer       Review of Systems:   Review of Systems   Constitutional:  Negative for chills and fever.   HENT:  Negative for trouble swallowing.    Eyes:  Negative for visual disturbance.   Respiratory:  Negative for chest tightness and shortness of breath.    Cardiovascular:  Negative for chest pain, palpitations and leg swelling.   Gastrointestinal:  Negative for abdominal distention, abdominal pain, anal bleeding, blood in stool, nausea and vomiting.   Genitourinary:  Negative for hematuria.   Skin:  Negative for rash.   Neurological:  Negative for dizziness, seizures, syncope and light-headedness.   Psychiatric/Behavioral:  Negative for agitation and behavioral problems.        Vital Signs:     Vitals:    05/13/24 1129 05/13/24 1132   BP: 124/67 134/72   BP Location: Left arm Right arm   Patient Position: Sitting Sitting   Cuff Size: Large Standard   Pulse: 82    Temp: (!) 96.4 °F (35.8 °C)    TempSrc: Tympanic    SpO2: 99%    Weight: 126 kg (277 lb)    Height: 6' 1\" (1.854 m)        Physical Exam  Constitutional:       General: He is not in acute distress.     Appearance: Normal appearance. He is obese. He is not ill-appearing.   HENT:      Head: Normocephalic and atraumatic.      Nose: Nose normal.      Mouth/Throat:      Mouth: Mucous membranes are moist.      Pharynx: Oropharynx is clear.   Eyes:      Extraocular Movements: Extraocular movements intact.   Neck:      Vascular: No carotid bruit.   Cardiovascular:      Rate and Rhythm: Normal rate.      Pulses: Normal pulses.      Heart sounds: Normal heart sounds. No murmur heard.  Pulmonary:      Effort: Pulmonary effort is normal. No respiratory distress.      Breath sounds: Normal breath sounds. No wheezing or rales.   Abdominal:      General: Bowel sounds are normal. There is no distension.      Palpations: Abdomen is soft.      " Tenderness: There is no abdominal tenderness.   Musculoskeletal:      Cervical back: Normal range of motion and neck supple. No tenderness.      Right lower leg: No edema.      Left lower leg: No edema.   Skin:     General: Skin is warm and dry.      Coloration: Skin is not jaundiced.   Neurological:      General: No focal deficit present.      Mental Status: He is alert and oriented to person, place, and time. Mental status is at baseline.   Psychiatric:         Mood and Affect: Mood normal.         Behavior: Behavior normal.         Lab Results:         Results from last 7 days   Lab Units 05/07/24  0755   POTASSIUM mmol/L 4.6   CHLORIDE mmol/L 103   CO2 mmol/L 30   BUN mg/dL 17   CREATININE mg/dL 1.06   CALCIUM mg/dL 9.6   4/30/24  BMP: Sodium 139 potassium 4.6 BUN 17 creatinine 1.06  INR 0.87        Lab Results   Component Value Date    HGBA1C 6.0 (H) 05/01/2023     Lab Results   Component Value Date    CKTOTAL 233 07/31/2018    CKMB 1.9 07/31/2018    CKMBINDEX <1.0 07/31/2018       Imaging Studies:     Cardiac Catheterization: 5/7/24  •  Ost LM lesion is 60% stenosed.  •  Mid LM to Dist LM lesion is 70% stenosed.  •  Ost LAD lesion is 80% stenosed.  •  Ost Cx to Prox Cx lesion is 85% stenosed.  •  Mid Cx lesion is 60% stenosed.  •  Dist RCA lesion is 70% stenosed.   Impression:  Triple vessel CAD involving left main bifurcating into ostial LAD and ostial left circumflex as described  Recommendation:  Recommend cardiothoracic surgery evaluation for CABG  Medical therapy for CAD including ASA  Routine post cath care  Finding of cardiac catheterization and further management was discussed in detail with patient     Echocardiogram: 5/7/24  •  Left Ventricle: Left ventricular cavity size is normal. Wall thickness is normal. The left ventricular ejection fraction is 40%. Systolic function is mildly reduced. There is mild global hypokinesis. Diastolic function is mildly abnormal, consistent with grade I (abnormal)  relaxation.  •  Right Ventricle: Right ventricular cavity size is mildly dilated.  •  Aortic Valve: There is mild regurgitation. There is mild stenosis.  •  Mitral Valve: There is mild annular calcification.  •  Pericardium: There is a small to moderate pericardial effusion. There is no echocardiographic evidence of tamponade.    CT coronary calcium score: 23  Coronary artery calcium score breakdown is as follows:  Left main coronary artery:  169  Left anterior descending coronary artery and diagonal branches:  706  Left circumflex coronary artery and left marginal branches:  294  Right coronary artery and right marginal branches:  496  Posterior descending coronary artery: 0  TOTAL coronary calcium score:  1665  Calcium score PERCENTILE of age, race, and gender matched database participants in the Multi-Ethnic Study of Atherosclerosis (CARDOSO) trial:   99th percentile  HEART/GREAT VESSELS: Small pericardial effusion. Mild calcification of aortic valve suggesting possible element of aortic stenosis. No thoracic aortic aneurysm.  EXTRACARDIAC FINDINGS: No significant findings identified on limited small field of view low radiation dose noncontrast images of the chest at the level of the heart.  IMPRESSION:  Total coronary calcium score equals 1665. For more useful information regarding the prognostic significance of the calcium score, please consult the calculator at the website http://[email or web address]-nhlbi.org/CACReference.aspx.    EK24  Sinus rhythm with first-degree AV block   Right bundle branch block     I have personally reviewed pertinent reports.   and I have personally reviewed pertinent films in PACS    Assessment:  Patient Active Problem List    Diagnosis Date Noted   • RBBB 2024   • CAD, multiple vessel 2024   • Class 2 obesity 2024   • 1st degree AV block 2024   • Left main coronary artery disease 2024   • Chronic systolic heart failure (HCC) 2024   • Pericardial  effusion 04/23/2024   • Radiation-induced heart disease 04/23/2024   • Primary hypertension 04/23/2024   • Mixed hyperlipidemia 04/23/2024   • Cardiomyopathy (HCC) 07/05/2023   • Family history of ischemic bowel disease 07/05/2023   • Plantar fasciitis 08/28/2018   • Mononeuropathy 11/04/2016   • Hypercholesterolemia 04/09/2014     Impression:  - severe LM/MVCAD  - mixed ischemic and radiation/chemo-induced cardiomyopathy  - mild LV dysfunction LVEF 40%  - chronic systolic CHF  - history of Hodgkin's lymphoma status post chemo and chest radiation and bone marrow biopsy at age 19  - mild AI  - small-moderate pericardial effusion  - 1st AVB/RBBB  - hypertension, hyperlipidemia  - pre DM, A1c 6.0    Plan:  Surgery, surgical recovery, and risks and benefits of coronary artery bypass grafting were discussed in detail today with the patient.  They understand and wish to proceed with further workup and ultimately surgical intervention.      We have ordered routine preoperative laboratory and vascular studies including vein mapping, radial/palmar arch studies and carotid ultrasound    After the tests are completed, he will return to the office to review results and further discuss/schedule surgery with Dr. Keshav Alex D.O.    Marcelina Lemuralidain was comfortable with our recommendations, and their questions were answered to their satisfaction.  Thank you for allowing us to participate in the care of this patient.     The patient recently had a screening colonoscopy in 5/2023.  Therefore GI referral is not indicated at this time.     SIGNATURE: Herberth George PA-C  DATE: 05/13/24  TIME: 11:33 AM    * This note was completed in part utilizing [employer] direct voice recognition software.   Grammatical errors, random word insertion, spelling mistakes, and incomplete sentences may be an occasional consequence of the system secondary to software limitations, ambient noise and hardware issues. At the time of dictation, efforts  were made to edit, clarify and /or correct errors. Please read the chart carefully and recognize, using context, where substitutions have occurred.  If you have any questions or concerns about the context, text or information contained within the body of this dictation, please contact myself, the provider, for further clarification.   Attestation signed by Keshav Alex DO at 5/13/2024 12:05 PM:  Attending Attestation:    I supervised the Advanced Practitioner.  I discussed the case with the Advanced Practitioner, reviewed the note and agree.    54M who presents for evaluation of CAD and cardiomyopathy.  He was sent for cath after screening echo showed an EF of 40%.  He has a history of Hodgkins lymphoma with chemo and radiation.  Cath and TTE were reviewed by me personally.  Cath showed significant 3V CAD and LM CAD.  TTE showed an EF of 40% with mild AI.  Based on these findings, I recommend coronary artery bypass grafting.  The risks, benefits and alternatives were discussed with the patient in detail.  Pre-op testing, including vein mapping, carotid duplex and radial artery studies were ordered.  He will return to the office once his testing is complete.

## 2025-07-03 NOTE — TELEPHONE ENCOUNTER
Called and spoke to patient. Provided the correct fax number. Once paperwork is received, forms will be completed.

## 2025-07-03 NOTE — TELEPHONE ENCOUNTER
Caller: Patient    Doctor: Dr. Britton    Call back #: [phone]    Reason for call: Patient faxed over paperwork from [employer], but they were received blank from our fax machine. Patient is sending documents through [employer] for Dr. Britton to fill out.

## 2025-07-08 DIAGNOSIS — E03.9 ACQUIRED HYPOTHYROIDISM: ICD-10-CM

## 2025-07-09 RX ORDER — LEVOTHYROXINE SODIUM 25 MCG
25 TABLET ORAL EVERY MORNING
Qty: 90 TABLET | Refills: 1 | Status: SHIPPED | OUTPATIENT
Start: 2025-07-09

## 2025-07-10 ENCOUNTER — IN-CLINIC DEVICE VISIT (OUTPATIENT)
Dept: CARDIOLOGY CLINIC | Facility: CLINIC | Age: 55
End: 2025-07-10

## 2025-07-10 DIAGNOSIS — Z95.0 PRESENCE OF CARDIAC PACEMAKER: Primary | ICD-10-CM

## 2025-07-10 PROCEDURE — 99024 POSTOP FOLLOW-UP VISIT: CPT | Performed by: INTERNAL MEDICINE

## 2025-07-10 NOTE — TELEPHONE ENCOUNTER
Received paperwork. Called patient to discuss dates.     Forms completed and faxed to [phone]

## 2025-07-10 NOTE — PROGRESS NOTES
Results for orders placed or performed in visit on 07/10/25   Cardiac EP device report    Narrative    MDT BI-V PM/ ACTIVE SYSTEM IS MRI CONDITIONAL  [facility]. WOUND CHECK: INCISION CLEAN AND DRY WITH EDGES APPROXIMATED; WOUND CARE AND RESTRICTIONS REVIEWED WITH PATIENT (PIC IN MEDIA). BATTERY VOLTAGE ADEQUATE (9.5 YR). AP 0.1%. TOTAL  99.6%. VSRP <0.1%. ALL LEAD PARAMETERS WITHIN NORMAL LIMITS. 3 EPISODES OF SVT-ST @ 154 BPM, MAX DURATION 57 S. 30 V SENSING EPISODES W/ AVAIL MARKER'S SHOWING NSR & SVT/ST @ 133-162 BPM, MAX DURATION 13 MIN 23 S. PVC BURDEN <0.1%. PT TAKES ASA 81 MG & METOPROLOL SUCCINATE. OPTI-VOL FLUID THRESHOLD INITIALIZING. NO PROGRAMMING CHANGES MADE TO DEVICE PARAMETERS. NORMAL DEVICE FUNCTION. [facility]

## 2025-08-04 DIAGNOSIS — E03.9 ACQUIRED HYPOTHYROIDISM: ICD-10-CM

## 2025-08-06 RX ORDER — LEVOTHYROXINE SODIUM 25 MCG
25 TABLET ORAL EVERY MORNING
Qty: 90 TABLET | Refills: 1 | Status: SHIPPED | OUTPATIENT
Start: 2025-08-06

## 2025-08-07 ENCOUNTER — TELEPHONE (OUTPATIENT)
Age: 55
End: 2025-08-07

## (undated) DEVICE — GLIDESHEATH SLENDER STAINLESS STEEL KIT: Brand: GLIDESHEATH SLENDER

## (undated) DEVICE — INTRO SHEATH PEEL AWAY 9 FR

## (undated) DEVICE — CATH GUIDING FIXED SHAPE 43CM

## (undated) DEVICE — RUNTHROUGH NS EXTRA FLOPPY PTCA GUIDEWIRE: Brand: RUNTHROUGH

## (undated) DEVICE — DGW .035 FC J3MM 260CM TEF: Brand: EMERALD

## (undated) DEVICE — RADIFOCUS OPTITORQUE ANGIOGRAPHIC CATHETER: Brand: OPTITORQUE

## (undated) DEVICE — Device: Brand: WEBSTER

## (undated) DEVICE — GUIDE SHEATH 9FR ATTAIN COMMAND 9FR EH CURVE

## (undated) DEVICE — INTRO SHEATH PEEL AWAY 7FR